# Patient Record
Sex: MALE | Race: WHITE | Employment: OTHER | URBAN - METROPOLITAN AREA
[De-identification: names, ages, dates, MRNs, and addresses within clinical notes are randomized per-mention and may not be internally consistent; named-entity substitution may affect disease eponyms.]

---

## 2019-01-01 ENCOUNTER — APPOINTMENT (OUTPATIENT)
Dept: CT IMAGING | Age: 84
DRG: 291 | End: 2019-01-01
Attending: THORACIC SURGERY (CARDIOTHORACIC VASCULAR SURGERY)
Payer: MEDICARE

## 2019-01-01 ENCOUNTER — APPOINTMENT (OUTPATIENT)
Dept: GENERAL RADIOLOGY | Age: 84
DRG: 291 | End: 2019-01-01
Attending: HOSPITALIST
Payer: MEDICARE

## 2019-01-01 ENCOUNTER — DOCUMENTATION ONLY (OUTPATIENT)
Dept: SURGERY | Age: 84
End: 2019-01-01

## 2019-01-01 ENCOUNTER — APPOINTMENT (OUTPATIENT)
Dept: GENERAL RADIOLOGY | Age: 84
DRG: 291 | End: 2019-01-01
Attending: EMERGENCY MEDICINE
Payer: MEDICARE

## 2019-01-01 ENCOUNTER — HOSPITAL ENCOUNTER (OUTPATIENT)
Dept: PET IMAGING | Age: 84
Discharge: HOME | End: 2019-12-27
Attending: THORACIC SURGERY (CARDIOTHORACIC VASCULAR SURGERY)
Payer: MEDICARE

## 2019-01-01 ENCOUNTER — HOSPITAL ENCOUNTER (INPATIENT)
Age: 84
LOS: 5 days | Discharge: NURSING, REHAB OR LONG-TERM CARE | DRG: 291 | End: 2019-12-09
Attending: STUDENT IN AN ORGANIZED HEALTH CARE EDUCATION/TRAINING PROGRAM | Admitting: FAMILY MEDICINE
Payer: MEDICARE

## 2019-01-01 ENCOUNTER — APPOINTMENT (OUTPATIENT)
Dept: CT IMAGING | Age: 84
DRG: 291 | End: 2019-01-01
Attending: INTERNAL MEDICINE
Payer: MEDICARE

## 2019-01-01 ENCOUNTER — APPOINTMENT (OUTPATIENT)
Dept: NON INVASIVE DIAGNOSTICS | Age: 84
DRG: 291 | End: 2019-01-01
Attending: FAMILY MEDICINE
Payer: MEDICARE

## 2019-01-01 ENCOUNTER — APPOINTMENT (OUTPATIENT)
Dept: ULTRASOUND IMAGING | Age: 84
DRG: 291 | End: 2019-01-01
Attending: HOSPITALIST
Payer: MEDICARE

## 2019-01-01 ENCOUNTER — TELEPHONE (OUTPATIENT)
Dept: SURGERY | Age: 84
End: 2019-01-01

## 2019-01-01 ENCOUNTER — APPOINTMENT (OUTPATIENT)
Dept: GENERAL RADIOLOGY | Age: 84
DRG: 291 | End: 2019-01-01
Attending: RADIOLOGY
Payer: MEDICARE

## 2019-01-01 ENCOUNTER — APPOINTMENT (OUTPATIENT)
Dept: GENERAL RADIOLOGY | Age: 84
End: 2019-01-01
Attending: THORACIC SURGERY (CARDIOTHORACIC VASCULAR SURGERY)
Payer: MEDICARE

## 2019-01-01 ENCOUNTER — HOSPITAL ENCOUNTER (OUTPATIENT)
Dept: PET IMAGING | Age: 84
Discharge: HOME | End: 2019-12-20
Attending: THORACIC SURGERY (CARDIOTHORACIC VASCULAR SURGERY)
Payer: MEDICARE

## 2019-01-01 ENCOUNTER — DOCUMENTATION ONLY (OUTPATIENT)
Dept: CASE MANAGEMENT | Age: 84
End: 2019-01-01

## 2019-01-01 ENCOUNTER — HOSPITAL ENCOUNTER (OUTPATIENT)
Dept: GENERAL RADIOLOGY | Age: 84
Discharge: HOME | End: 2019-12-17
Payer: MEDICARE

## 2019-01-01 ENCOUNTER — APPOINTMENT (OUTPATIENT)
Dept: GENERAL RADIOLOGY | Age: 84
End: 2019-01-01
Attending: EMERGENCY MEDICINE
Payer: MEDICARE

## 2019-01-01 ENCOUNTER — OFFICE VISIT (OUTPATIENT)
Dept: SURGERY | Age: 84
End: 2019-01-01

## 2019-01-01 ENCOUNTER — APPOINTMENT (OUTPATIENT)
Dept: GENERAL RADIOLOGY | Age: 84
End: 2019-01-01
Attending: NURSE PRACTITIONER
Payer: MEDICARE

## 2019-01-01 ENCOUNTER — APPOINTMENT (OUTPATIENT)
Dept: ULTRASOUND IMAGING | Age: 84
DRG: 291 | End: 2019-01-01
Attending: FAMILY MEDICINE
Payer: MEDICARE

## 2019-01-01 ENCOUNTER — APPOINTMENT (OUTPATIENT)
Dept: NON INVASIVE DIAGNOSTICS | Age: 84
DRG: 291 | End: 2019-01-01
Attending: HOSPITALIST
Payer: MEDICARE

## 2019-01-01 ENCOUNTER — HOSPITAL ENCOUNTER (INPATIENT)
Age: 84
LOS: 7 days | Discharge: NURSING, REHAB OR LONG-TERM CARE | DRG: 291 | End: 2019-08-01
Attending: EMERGENCY MEDICINE | Admitting: INTERNAL MEDICINE
Payer: MEDICARE

## 2019-01-01 ENCOUNTER — APPOINTMENT (OUTPATIENT)
Dept: CT IMAGING | Age: 84
DRG: 291 | End: 2019-01-01
Attending: STUDENT IN AN ORGANIZED HEALTH CARE EDUCATION/TRAINING PROGRAM
Payer: MEDICARE

## 2019-01-01 ENCOUNTER — ANESTHESIA (OUTPATIENT)
Dept: ENDOSCOPY | Age: 84
DRG: 291 | End: 2019-01-01
Payer: MEDICARE

## 2019-01-01 ENCOUNTER — PATIENT OUTREACH (OUTPATIENT)
Dept: INTERNAL MEDICINE CLINIC | Age: 84
End: 2019-01-01

## 2019-01-01 ENCOUNTER — HOSPITAL ENCOUNTER (OUTPATIENT)
Dept: LAB | Age: 84
Discharge: HOME | End: 2019-12-17
Payer: MEDICARE

## 2019-01-01 ENCOUNTER — ANESTHESIA (OUTPATIENT)
Dept: ENDOSCOPY | Age: 84
End: 2019-01-01
Payer: MEDICARE

## 2019-01-01 ENCOUNTER — HOSPITAL ENCOUNTER (OUTPATIENT)
Age: 84
Setting detail: OBSERVATION
Discharge: HOME HEALTH | End: 2019-12-24
Attending: EMERGENCY MEDICINE | Admitting: HOSPITALIST
Payer: MEDICARE

## 2019-01-01 ENCOUNTER — ANESTHESIA EVENT (OUTPATIENT)
Dept: ENDOSCOPY | Age: 84
End: 2019-01-01
Payer: MEDICARE

## 2019-01-01 ENCOUNTER — APPOINTMENT (OUTPATIENT)
Dept: GENERAL RADIOLOGY | Age: 84
DRG: 291 | End: 2019-01-01
Attending: STUDENT IN AN ORGANIZED HEALTH CARE EDUCATION/TRAINING PROGRAM
Payer: MEDICARE

## 2019-01-01 ENCOUNTER — ANESTHESIA EVENT (OUTPATIENT)
Dept: ENDOSCOPY | Age: 84
DRG: 291 | End: 2019-01-01
Payer: MEDICARE

## 2019-01-01 VITALS
HEIGHT: 74 IN | TEMPERATURE: 97.6 F | RESPIRATION RATE: 18 BRPM | DIASTOLIC BLOOD PRESSURE: 88 MMHG | HEART RATE: 50 BPM | WEIGHT: 151.9 LBS | BODY MASS INDEX: 19.49 KG/M2 | SYSTOLIC BLOOD PRESSURE: 134 MMHG | OXYGEN SATURATION: 99 %

## 2019-01-01 VITALS — HEIGHT: 73 IN | BODY MASS INDEX: 21.2 KG/M2 | WEIGHT: 160 LBS

## 2019-01-01 VITALS
BODY MASS INDEX: 19.64 KG/M2 | HEART RATE: 50 BPM | DIASTOLIC BLOOD PRESSURE: 65 MMHG | OXYGEN SATURATION: 96 % | HEIGHT: 72 IN | SYSTOLIC BLOOD PRESSURE: 133 MMHG | WEIGHT: 145 LBS | RESPIRATION RATE: 18 BRPM

## 2019-01-01 VITALS
OXYGEN SATURATION: 100 % | HEART RATE: 59 BPM | TEMPERATURE: 97.1 F | SYSTOLIC BLOOD PRESSURE: 144 MMHG | DIASTOLIC BLOOD PRESSURE: 59 MMHG | BODY MASS INDEX: 19.89 KG/M2 | RESPIRATION RATE: 20 BRPM | WEIGHT: 146.83 LBS | HEIGHT: 72 IN

## 2019-01-01 VITALS
WEIGHT: 143.74 LBS | SYSTOLIC BLOOD PRESSURE: 160 MMHG | HEIGHT: 72 IN | RESPIRATION RATE: 16 BRPM | BODY MASS INDEX: 19.47 KG/M2 | OXYGEN SATURATION: 100 % | TEMPERATURE: 97.3 F | DIASTOLIC BLOOD PRESSURE: 82 MMHG | HEART RATE: 60 BPM

## 2019-01-01 DIAGNOSIS — J90 PLEURAL EFFUSION: Primary | ICD-10-CM

## 2019-01-01 DIAGNOSIS — C85.12 B-CELL LYMPHOMA OF INTRATHORACIC LYMPH NODES, UNSPECIFIED B-CELL LYMPHOMA TYPE (HCC): ICD-10-CM

## 2019-01-01 DIAGNOSIS — I50.9 ACUTE ON CHRONIC CONGESTIVE HEART FAILURE, UNSPECIFIED HEART FAILURE TYPE (HCC): ICD-10-CM

## 2019-01-01 DIAGNOSIS — D64.9 ANEMIA, UNSPECIFIED TYPE: ICD-10-CM

## 2019-01-01 DIAGNOSIS — N17.9 AKI (ACUTE KIDNEY INJURY) (HCC): Primary | ICD-10-CM

## 2019-01-01 DIAGNOSIS — R06.09 DYSPNEA ON EXERTION: Primary | ICD-10-CM

## 2019-01-01 DIAGNOSIS — J90 PLEURAL EFFUSION: ICD-10-CM

## 2019-01-01 DIAGNOSIS — Z11.59 ENCOUNTER FOR SCREENING FOR OTHER VIRAL DISEASES: ICD-10-CM

## 2019-01-01 DIAGNOSIS — J90 LARGE PLEURAL EFFUSION: ICD-10-CM

## 2019-01-01 DIAGNOSIS — C85.12 B-CELL LYMPHOMA OF INTRATHORACIC LYMPH NODES, UNSPECIFIED B-CELL LYMPHOMA TYPE (HCC): Primary | ICD-10-CM

## 2019-01-01 DIAGNOSIS — C85.92 NON-HODGKIN LYMPHOMA OF INTRATHORACIC LYMPH NODES, UNSPECIFIED NON-HODGKIN LYMPHOMA TYPE (HCC): ICD-10-CM

## 2019-01-01 DIAGNOSIS — R19.5 OCCULT BLOOD IN STOOLS: ICD-10-CM

## 2019-01-01 LAB
ALBUMIN SERPL ELPH-MCNC: 3.1 G/DL (ref 2.9–4.4)
ALBUMIN SERPL-MCNC: 3 G/DL (ref 3.5–5)
ALBUMIN SERPL-MCNC: 3.1 G/DL (ref 3.5–5)
ALBUMIN SERPL-MCNC: 3.2 G/DL (ref 3.5–5)
ALBUMIN SERPL-MCNC: 3.2 G/DL (ref 3.5–5)
ALBUMIN SERPL-MCNC: 3.3 G/DL (ref 3.5–5)
ALBUMIN SERPL-MCNC: 3.3 G/DL (ref 3.5–5)
ALBUMIN/GLOB SERPL: 0.8 {RATIO} (ref 1.1–2.2)
ALBUMIN/GLOB SERPL: 0.9 {RATIO} (ref 1.1–2.2)
ALBUMIN/GLOB SERPL: 0.9 {RATIO} (ref 1.1–2.2)
ALBUMIN/GLOB SERPL: 1 {RATIO} (ref 0.7–1.7)
ALBUMIN/GLOB SERPL: 1 {RATIO} (ref 1.1–2.2)
ALP SERPL-CCNC: 62 U/L (ref 45–117)
ALP SERPL-CCNC: 69 U/L (ref 45–117)
ALP SERPL-CCNC: 71 U/L (ref 45–117)
ALP SERPL-CCNC: 72 U/L (ref 45–117)
ALP SERPL-CCNC: 73 U/L (ref 45–117)
ALP SERPL-CCNC: 75 U/L (ref 45–117)
ALPHA1 GLOB SERPL ELPH-MCNC: 0.3 G/DL (ref 0–0.4)
ALPHA2 GLOB SERPL ELPH-MCNC: 0.7 G/DL (ref 0.4–1)
ALT SERPL-CCNC: 19 U/L (ref 12–78)
ALT SERPL-CCNC: 22 U/L (ref 12–78)
ALT SERPL-CCNC: 29 U/L (ref 12–78)
ALT SERPL-CCNC: 31 U/L (ref 12–78)
ALT SERPL-CCNC: 31 U/L (ref 12–78)
ALT SERPL-CCNC: 33 U/L (ref 12–78)
ANION GAP SERPL CALC-SCNC: 10 MMOL/L (ref 5–15)
ANION GAP SERPL CALC-SCNC: 5 MMOL/L (ref 5–15)
ANION GAP SERPL CALC-SCNC: 5 MMOL/L (ref 5–15)
ANION GAP SERPL CALC-SCNC: 6 MMOL/L (ref 5–15)
ANION GAP SERPL CALC-SCNC: 7 MMOL/L (ref 5–15)
ANION GAP SERPL CALC-SCNC: 8 MMOL/L (ref 5–15)
ANION GAP SERPL CALC-SCNC: 8 MMOL/L (ref 5–15)
ANION GAP SERPL CALC-SCNC: 9 MMOL/L (ref 5–15)
APPEARANCE FLD: ABNORMAL
APPEARANCE FLD: ABNORMAL
APPEARANCE UR: CLEAR
APPEARANCE UR: CLEAR
APTT PPP: 33.3 SEC (ref 22.1–32)
AST SERPL-CCNC: 23 U/L (ref 15–37)
AST SERPL-CCNC: 26 U/L (ref 15–37)
AST SERPL-CCNC: 31 U/L (ref 15–37)
AST SERPL-CCNC: 34 U/L (ref 15–37)
AST SERPL-CCNC: 37 U/L (ref 15–37)
AST SERPL-CCNC: 37 U/L (ref 15–37)
ATRIAL RATE: 288 BPM
ATRIAL RATE: 71 BPM
ATRIAL RATE: 74 BPM
ATRIAL RATE: 87 BPM
AV VELOCITY RATIO: 0.42
AV VTI RATIO: 0.4
B-GLOBULIN SERPL ELPH-MCNC: 0.9 G/DL (ref 0.7–1.3)
BACTERIA SPEC CULT: NORMAL
BACTERIA URNS QL MICRO: NEGATIVE /HPF
BASOPHILS # BLD MANUAL: 0.1 X10E3/UL (ref 0–0.2)
BASOPHILS # BLD: 0 K/UL (ref 0–0.1)
BASOPHILS # BLD: 0.1 K/UL (ref 0–0.1)
BASOPHILS NFR BLD MANUAL: 1 %
BASOPHILS NFR BLD: 0 % (ref 0–1)
BASOPHILS NFR BLD: 1 % (ref 0–1)
BILIRUB SERPL-MCNC: 0.4 MG/DL (ref 0.2–1)
BILIRUB SERPL-MCNC: 0.5 MG/DL (ref 0.2–1)
BILIRUB SERPL-MCNC: 0.6 MG/DL (ref 0.2–1)
BILIRUB UR QL: NEGATIVE
BILIRUB UR QL: NEGATIVE
BNP SERPL-MCNC: 3299 PG/ML
BNP SERPL-MCNC: 4482 PG/ML
BUN SERPL-MCNC: 38 MG/DL (ref 6–20)
BUN SERPL-MCNC: 41 MG/DL (ref 6–20)
BUN SERPL-MCNC: 42 MG/DL (ref 6–20)
BUN SERPL-MCNC: 43 MG/DL (ref 6–20)
BUN SERPL-MCNC: 43 MG/DL (ref 6–20)
BUN SERPL-MCNC: 45 MG/DL (ref 6–20)
BUN SERPL-MCNC: 49 MG/DL (ref 6–20)
BUN SERPL-MCNC: 50 MG/DL (ref 6–20)
BUN SERPL-MCNC: 52 MG/DL (ref 6–20)
BUN SERPL-MCNC: 55 MG/DL (ref 6–20)
BUN SERPL-MCNC: 57 MG/DL (ref 6–20)
BUN SERPL-MCNC: 58 MG/DL (ref 6–20)
BUN SERPL-MCNC: 63 MG/DL (ref 6–20)
BUN SERPL-MCNC: 70 MG/DL (ref 6–20)
BUN SERPL-MCNC: 71 MG/DL (ref 6–20)
BUN/CREAT SERPL: 20 (ref 12–20)
BUN/CREAT SERPL: 20 (ref 12–20)
BUN/CREAT SERPL: 22 (ref 12–20)
BUN/CREAT SERPL: 23 (ref 12–20)
BUN/CREAT SERPL: 23 (ref 12–20)
BUN/CREAT SERPL: 25 (ref 12–20)
BUN/CREAT SERPL: 26 (ref 12–20)
BUN/CREAT SERPL: 28 (ref 12–20)
BUN/CREAT SERPL: 28 (ref 12–20)
BUN/CREAT SERPL: 29 (ref 12–20)
BUN/CREAT SERPL: 30 (ref 12–20)
BUN/CREAT SERPL: 32 (ref 12–20)
BUN/CREAT SERPL: 33 (ref 12–20)
CALCIUM SERPL-MCNC: 8.5 MG/DL (ref 8.5–10.1)
CALCIUM SERPL-MCNC: 8.6 MG/DL (ref 8.5–10.1)
CALCIUM SERPL-MCNC: 8.7 MG/DL (ref 8.5–10.1)
CALCIUM SERPL-MCNC: 8.7 MG/DL (ref 8.5–10.1)
CALCIUM SERPL-MCNC: 8.8 MG/DL (ref 8.5–10.1)
CALCIUM SERPL-MCNC: 8.9 MG/DL (ref 8.5–10.1)
CALCIUM SERPL-MCNC: 9 MG/DL (ref 8.5–10.1)
CALCIUM SERPL-MCNC: 9.1 MG/DL (ref 8.5–10.1)
CALCIUM SERPL-MCNC: 9.6 MG/DL (ref 8.5–10.1)
CALCIUM SERPL-MCNC: 9.6 MG/DL (ref 8.5–10.1)
CALCULATED R AXIS, ECG10: -36 DEGREES
CALCULATED R AXIS, ECG10: -37 DEGREES
CALCULATED R AXIS, ECG10: -39 DEGREES
CALCULATED R AXIS, ECG10: -45 DEGREES
CALCULATED T AXIS, ECG11: 63 DEGREES
CALCULATED T AXIS, ECG11: 84 DEGREES
CALCULATED T AXIS, ECG11: 87 DEGREES
CALCULATED T AXIS, ECG11: 98 DEGREES
CHLORIDE SERPL-SCNC: 100 MMOL/L (ref 97–108)
CHLORIDE SERPL-SCNC: 100 MMOL/L (ref 97–108)
CHLORIDE SERPL-SCNC: 101 MMOL/L (ref 97–108)
CHLORIDE SERPL-SCNC: 102 MMOL/L (ref 97–108)
CHLORIDE SERPL-SCNC: 103 MMOL/L (ref 97–108)
CHLORIDE SERPL-SCNC: 104 MMOL/L (ref 97–108)
CHLORIDE SERPL-SCNC: 105 MMOL/L (ref 97–108)
CHLORIDE SERPL-SCNC: 106 MMOL/L (ref 97–108)
CHLORIDE SERPL-SCNC: 107 MMOL/L (ref 97–108)
CHLORIDE SERPL-SCNC: 109 MMOL/L (ref 97–108)
CHLORIDE SERPL-SCNC: 112 MMOL/L (ref 97–108)
CHLORIDE SERPL-SCNC: 114 MMOL/L (ref 97–108)
CHLORIDE SERPL-SCNC: 115 MMOL/L (ref 97–108)
CHOLEST SERPL-MCNC: 142 MG/DL
CO2 SERPL-SCNC: 25 MMOL/L (ref 21–32)
CO2 SERPL-SCNC: 26 MMOL/L (ref 21–32)
CO2 SERPL-SCNC: 26 MMOL/L (ref 21–32)
CO2 SERPL-SCNC: 27 MMOL/L (ref 21–32)
CO2 SERPL-SCNC: 28 MMOL/L (ref 21–32)
CO2 SERPL-SCNC: 29 MMOL/L (ref 21–32)
CO2 SERPL-SCNC: 29 MMOL/L (ref 21–32)
CO2 SERPL-SCNC: 30 MMOL/L (ref 21–32)
CO2 SERPL-SCNC: 31 MMOL/L (ref 21–32)
CO2 SERPL-SCNC: 32 MMOL/L (ref 21–32)
COLOR FLD: ABNORMAL
COLOR FLD: ABNORMAL
COLOR UR: ABNORMAL
COLOR UR: NORMAL
COMMENT, HOLDF: NORMAL
CREAT SERPL-MCNC: 1.31 MG/DL (ref 0.7–1.3)
CREAT SERPL-MCNC: 1.35 MG/DL (ref 0.7–1.3)
CREAT SERPL-MCNC: 1.5 MG/DL (ref 0.7–1.3)
CREAT SERPL-MCNC: 1.51 MG/DL (ref 0.7–1.3)
CREAT SERPL-MCNC: 1.51 MG/DL (ref 0.7–1.3)
CREAT SERPL-MCNC: 1.54 MG/DL (ref 0.7–1.3)
CREAT SERPL-MCNC: 1.77 MG/DL (ref 0.7–1.3)
CREAT SERPL-MCNC: 1.8 MG/DL (ref 0.7–1.3)
CREAT SERPL-MCNC: 2.14 MG/DL (ref 0.7–1.3)
CREAT SERPL-MCNC: 2.35 MG/DL (ref 0.7–1.3)
CREAT SERPL-MCNC: 2.42 MG/DL (ref 0.7–1.3)
CREAT SERPL-MCNC: 2.58 MG/DL (ref 0.7–1.3)
CREAT SERPL-MCNC: 2.75 MG/DL (ref 0.7–1.3)
CREAT SERPL-MCNC: 2.78 MG/DL (ref 0.7–1.3)
CREAT SERPL-MCNC: 2.84 MG/DL (ref 0.7–1.3)
DIAGNOSIS, 93000: NORMAL
DIFFERENTIAL COMMENT, 115260: ABNORMAL
DIFFERENTIAL METHOD BLD: ABNORMAL
ECHO AO ROOT DIAM: 3.72 CM
ECHO AV AREA PEAK VELOCITY: 1.7 CM2
ECHO AV AREA PEAK VELOCITY: 2 CM2
ECHO AV AREA VTI: 1.6 CM2
ECHO AV AREA VTI: 2 CM2
ECHO AV CUSP MM: 1.59 CM
ECHO AV MEAN GRADIENT: 11.3 MMHG
ECHO AV MEAN GRADIENT: 15.5 MMHG
ECHO AV MEAN VELOCITY: 1.87 M/S
ECHO AV PEAK GRADIENT: 23.4 MMHG
ECHO AV PEAK GRADIENT: 25.3 MMHG
ECHO AV PEAK VELOCITY: 241.99 CM/S
ECHO AV PEAK VELOCITY: 251.69 CM/S
ECHO AV VTI: 55.45 CM
ECHO AV VTI: 59.27 CM
ECHO EST RA PRESSURE: 15 MMHG
ECHO LA AREA 4C: 24.4 CM2
ECHO LA AREA 4C: 35.1 CM2
ECHO LA MAJOR AXIS: 3.34 CM
ECHO LA MAJOR AXIS: 4.67 CM
ECHO LA TO AORTIC ROOT RATIO: 1.26
ECHO LA VOL 2C: 62.67 ML (ref 18–58)
ECHO LA VOL 4C: 125.91 ML (ref 18–58)
ECHO LA VOL 4C: 60.67 ML (ref 18–58)
ECHO LA VOL BP: 77.04 ML (ref 18–58)
ECHO LA VOL/BSA BIPLANE: 39.21 ML/M2 (ref 16–28)
ECHO LA VOLUME INDEX A2C: 31.9 ML/M2 (ref 16–28)
ECHO LA VOLUME INDEX A4C: 30.88 ML/M2 (ref 16–28)
ECHO LA VOLUME INDEX A4C: 65.43 ML/M2 (ref 16–28)
ECHO LV E' LATERAL VELOCITY: 10.23 CM/S
ECHO LV E' SEPTAL VELOCITY: 7.61 CM/S
ECHO LV INTERNAL DIMENSION DIASTOLIC: 4.3 CM (ref 4.2–5.9)
ECHO LV INTERNAL DIMENSION DIASTOLIC: 4.68 CM (ref 4.2–5.9)
ECHO LV INTERNAL DIMENSION SYSTOLIC: 2.68 CM
ECHO LV INTERNAL DIMENSION SYSTOLIC: 3 CM
ECHO LV IVSD: 1.09 CM (ref 0.6–1)
ECHO LV IVSD: 1.44 CM (ref 0.6–1)
ECHO LV MASS 2D: 235.1 G (ref 88–224)
ECHO LV MASS 2D: 242.5 G (ref 88–224)
ECHO LV MASS INDEX 2D: 119.7 G/M2 (ref 49–115)
ECHO LV MASS INDEX 2D: 126 G/M2 (ref 49–115)
ECHO LV POSTERIOR WALL DIASTOLIC: 1.14 CM (ref 0.6–1)
ECHO LV POSTERIOR WALL DIASTOLIC: 1.23 CM (ref 0.6–1)
ECHO LVOT DIAM: 2.17 CM
ECHO LVOT DIAM: 2.28 CM
ECHO LVOT PEAK GRADIENT: 4.5 MMHG
ECHO LVOT PEAK GRADIENT: 6.8 MMHG
ECHO LVOT PEAK VELOCITY: 105.6 CM/S
ECHO LVOT PEAK VELOCITY: 130.42 CM/S
ECHO LVOT SV: 117.3 ML
ECHO LVOT SV: 88.7 ML
ECHO LVOT VTI: 21.81 CM
ECHO LVOT VTI: 31.7 CM
ECHO MV A VELOCITY: 39.09 CM/S
ECHO MV AREA PHT: 3.5 CM2
ECHO MV E DECELERATION TIME (DT): 214.9 MS
ECHO MV E VELOCITY: 107.78 CM/S
ECHO MV E/A RATIO: 2.76
ECHO MV E/E' LATERAL: 10.54
ECHO MV E/E' RATIO (AVERAGED): 12.35
ECHO MV E/E' SEPTAL: 14.16
ECHO MV PRESSURE HALF TIME (PHT): 62.3 MS
ECHO PULMONARY ARTERY SYSTOLIC PRESSURE (PASP): 43 MMHG
ECHO PV MAX VELOCITY: 125.36 CM/S
ECHO PV PEAK GRADIENT: 6.3 MMHG
ECHO RIGHT VENTRICULAR SYSTOLIC PRESSURE (RVSP): 43 MMHG
ECHO RV INTERNAL DIMENSION: 2.76 CM
ECHO RV INTERNAL DIMENSION: 4.89 CM
ECHO RV TAPSE: 1.83 CM (ref 1.5–2)
ECHO RV TAPSE: 2.07 CM (ref 1.5–2)
ECHO TV REGURGITANT MAX VELOCITY: 246.91 CM/S
ECHO TV REGURGITANT MAX VELOCITY: 264.49 CM/S
ECHO TV REGURGITANT PEAK GRADIENT: 24.4 MMHG
ECHO TV REGURGITANT PEAK GRADIENT: 28 MMHG
EOSINOPHIL # BLD MANUAL: 0.1 X10E3/UL (ref 0–0.4)
EOSINOPHIL # BLD: 0.1 K/UL (ref 0–0.4)
EOSINOPHIL # BLD: 0.2 K/UL (ref 0–0.4)
EOSINOPHIL NFR BLD MANUAL: 2 %
EOSINOPHIL NFR BLD: 1 % (ref 0–7)
EOSINOPHIL NFR BLD: 2 % (ref 0–7)
EOSINOPHIL NFR BLD: 3 % (ref 0–7)
EOSINOPHIL NFR BLD: 4 % (ref 0–7)
EPITH CASTS URNS QL MICRO: ABNORMAL /LPF
ERYTHROCYTE [DISTWIDTH] IN BLOOD BY AUTOMATED COUNT: 15.4 % (ref 12.3–15.4)
ERYTHROCYTE [DISTWIDTH] IN BLOOD BY AUTOMATED COUNT: 16 % (ref 11.5–14.5)
ERYTHROCYTE [DISTWIDTH] IN BLOOD BY AUTOMATED COUNT: 16 % (ref 11.5–14.5)
ERYTHROCYTE [DISTWIDTH] IN BLOOD BY AUTOMATED COUNT: 16.1 % (ref 11.5–14.5)
ERYTHROCYTE [DISTWIDTH] IN BLOOD BY AUTOMATED COUNT: 16.2 % (ref 11.5–14.5)
ERYTHROCYTE [DISTWIDTH] IN BLOOD BY AUTOMATED COUNT: 16.3 % (ref 11.5–14.5)
ERYTHROCYTE [DISTWIDTH] IN BLOOD BY AUTOMATED COUNT: 17.1 % (ref 11.5–14.5)
ERYTHROCYTE [DISTWIDTH] IN BLOOD BY AUTOMATED COUNT: 17.1 % (ref 11.5–14.5)
ERYTHROCYTE [DISTWIDTH] IN BLOOD BY AUTOMATED COUNT: 17.3 % (ref 11.5–14.5)
ERYTHROCYTE [DISTWIDTH] IN BLOOD BY AUTOMATED COUNT: 17.4 % (ref 11.5–14.5)
ERYTHROCYTE [DISTWIDTH] IN BLOOD BY AUTOMATED COUNT: 17.5 % (ref 11.5–14.5)
ERYTHROCYTE [DISTWIDTH] IN BLOOD BY AUTOMATED COUNT: 17.5 % (ref 11.5–14.5)
ERYTHROCYTE [DISTWIDTH] IN BLOOD BY AUTOMATED COUNT: 18.6 % (ref 11.5–14.5)
EST. AVERAGE GLUCOSE BLD GHB EST-MCNC: 140 MG/DL
FERRITIN SERPL-MCNC: 132 NG/ML (ref 26–388)
FOLATE SERPL-MCNC: 9.8 NG/ML (ref 5–21)
GAMMA GLOB SERPL ELPH-MCNC: 1.2 G/DL (ref 0.4–1.8)
GLOBULIN SER CALC-MCNC: 3.1 G/DL (ref 2.2–3.9)
GLOBULIN SER CALC-MCNC: 3.2 G/DL (ref 2–4)
GLOBULIN SER CALC-MCNC: 3.6 G/DL (ref 2–4)
GLOBULIN SER CALC-MCNC: 3.6 G/DL (ref 2–4)
GLOBULIN SER CALC-MCNC: 3.7 G/DL (ref 2–4)
GLOBULIN SER CALC-MCNC: 3.8 G/DL (ref 2–4)
GLOBULIN SER CALC-MCNC: 3.9 G/DL (ref 2–4)
GLUCOSE FLD-MCNC: 26 MG/DL
GLUCOSE SERPL-MCNC: 103 MG/DL (ref 65–100)
GLUCOSE SERPL-MCNC: 113 MG/DL (ref 65–100)
GLUCOSE SERPL-MCNC: 116 MG/DL (ref 65–100)
GLUCOSE SERPL-MCNC: 119 MG/DL (ref 65–100)
GLUCOSE SERPL-MCNC: 120 MG/DL (ref 65–100)
GLUCOSE SERPL-MCNC: 123 MG/DL (ref 65–100)
GLUCOSE SERPL-MCNC: 126 MG/DL (ref 65–100)
GLUCOSE SERPL-MCNC: 127 MG/DL (ref 65–100)
GLUCOSE SERPL-MCNC: 139 MG/DL (ref 65–100)
GLUCOSE SERPL-MCNC: 140 MG/DL (ref 65–100)
GLUCOSE SERPL-MCNC: 142 MG/DL (ref 65–100)
GLUCOSE SERPL-MCNC: 146 MG/DL (ref 65–100)
GLUCOSE SERPL-MCNC: 160 MG/DL (ref 65–100)
GLUCOSE SERPL-MCNC: 179 MG/DL (ref 65–100)
GLUCOSE SERPL-MCNC: 251 MG/DL (ref 65–100)
GLUCOSE UR STRIP.AUTO-MCNC: NEGATIVE MG/DL
GLUCOSE UR STRIP.AUTO-MCNC: NEGATIVE MG/DL
GRAM STN SPEC: NORMAL
HBA1C MFR BLD: 6.5 % (ref 4–5.6)
HBV SURFACE AG SERPL QL IA: NEGATIVE
HCT VFR BLD AUTO: 25.4 % (ref 37.5–51)
HCT VFR BLD AUTO: 25.9 % (ref 36.6–50.3)
HCT VFR BLD AUTO: 26.3 % (ref 36.6–50.3)
HCT VFR BLD AUTO: 26.9 % (ref 36.6–50.3)
HCT VFR BLD AUTO: 26.9 % (ref 36.6–50.3)
HCT VFR BLD AUTO: 27.6 % (ref 36.6–50.3)
HCT VFR BLD AUTO: 27.6 % (ref 36.6–50.3)
HCT VFR BLD AUTO: 27.7 % (ref 36.6–50.3)
HCT VFR BLD AUTO: 27.9 % (ref 36.6–50.3)
HCT VFR BLD AUTO: 28.3 % (ref 36.6–50.3)
HCT VFR BLD AUTO: 29.3 % (ref 36.6–50.3)
HCT VFR BLD AUTO: 29.3 % (ref 36.6–50.3)
HCT VFR BLD AUTO: 31.2 % (ref 36.6–50.3)
HCT VFR BLD AUTO: 31.3 % (ref 36.6–50.3)
HCT VFR BLD AUTO: 31.3 % (ref 36.6–50.3)
HCT VFR BLD AUTO: 31.4 % (ref 36.6–50.3)
HCT VFR BLD AUTO: 32.7 % (ref 36.6–50.3)
HDLC SERPL-MCNC: 79 MG/DL
HDLC SERPL: 1.8 {RATIO} (ref 0–5)
HEMOCCULT STL QL: POSITIVE
HGB BLD-MCNC: 10.4 G/DL (ref 12.1–17)
HGB BLD-MCNC: 7.9 G/DL (ref 12.1–17)
HGB BLD-MCNC: 7.9 G/DL (ref 12.1–17)
HGB BLD-MCNC: 8.2 G/DL (ref 12.1–17)
HGB BLD-MCNC: 8.4 G/DL (ref 12.1–17)
HGB BLD-MCNC: 8.4 G/DL (ref 13–17.7)
HGB BLD-MCNC: 8.5 G/DL (ref 12.1–17)
HGB BLD-MCNC: 8.6 G/DL (ref 12.1–17)
HGB BLD-MCNC: 8.6 G/DL (ref 12.1–17)
HGB BLD-MCNC: 8.7 G/DL (ref 12.1–17)
HGB BLD-MCNC: 8.8 G/DL (ref 12.1–17)
HGB BLD-MCNC: 9.1 G/DL (ref 12.1–17)
HGB BLD-MCNC: 9.2 G/DL (ref 12.1–17)
HGB BLD-MCNC: 9.5 G/DL (ref 12.1–17)
HGB BLD-MCNC: 9.6 G/DL (ref 12.1–17)
HGB BLD-MCNC: 9.7 G/DL (ref 12.1–17)
HGB BLD-MCNC: 9.7 G/DL (ref 12.1–17)
HGB UR QL STRIP: NEGATIVE
HGB UR QL STRIP: NEGATIVE
HIV 1+2 AB+HIV1 P24 AG SERPL QL IA: NON REACTIVE
HYALINE CASTS URNS QL MICRO: ABNORMAL /LPF (ref 0–5)
IMM GRANULOCYTES # BLD AUTO: 0 K/UL (ref 0–0.04)
IMM GRANULOCYTES # BLD AUTO: 0.1 K/UL (ref 0–0.04)
IMM GRANULOCYTES NFR BLD AUTO: 0 % (ref 0–0.5)
IMM GRANULOCYTES NFR BLD AUTO: 1 % (ref 0–0.5)
INR PPP: 1.1 (ref 0.9–1.1)
INR PPP: 1.1 (ref 0.9–1.1)
IRON SERPL-MCNC: 28 UG/DL (ref 35–150)
KETONES UR QL STRIP.AUTO: NEGATIVE MG/DL
KETONES UR QL STRIP.AUTO: NEGATIVE MG/DL
LACTATE BLD-SCNC: 1.13 MMOL/L (ref 0.4–2)
LDH FLD L TO P-CCNC: 2579 U/L
LDH FLD L TO P-CCNC: 3663 U/L
LDH SERPL-CCNC: 323 IU/L (ref 121–224)
LDLC SERPL CALC-MCNC: 56.2 MG/DL (ref 0–100)
LEUKOCYTE ESTERASE UR QL STRIP.AUTO: ABNORMAL
LEUKOCYTE ESTERASE UR QL STRIP.AUTO: NEGATIVE
LIPID PROFILE,FLP: NORMAL
LVFS 2D: 37.78 %
LVOT MG: 2.6 MMHG
LVOT MV: 0.76 CM/S
LYMPHOCYTES # BLD MANUAL: 0.9 X10E3/UL (ref 0.7–3.1)
LYMPHOCYTES # BLD: 0.4 K/UL (ref 0.8–3.5)
LYMPHOCYTES # BLD: 0.4 K/UL (ref 0.8–3.5)
LYMPHOCYTES # BLD: 0.5 K/UL (ref 0.8–3.5)
LYMPHOCYTES # BLD: 0.6 K/UL (ref 0.8–3.5)
LYMPHOCYTES # BLD: 0.7 K/UL (ref 0.8–3.5)
LYMPHOCYTES NFR BLD MANUAL: 14 %
LYMPHOCYTES NFR BLD: 10 % (ref 12–49)
LYMPHOCYTES NFR BLD: 10 % (ref 12–49)
LYMPHOCYTES NFR BLD: 12 % (ref 12–49)
LYMPHOCYTES NFR BLD: 13 % (ref 12–49)
LYMPHOCYTES NFR BLD: 5 % (ref 12–49)
LYMPHOCYTES NFR BLD: 6 % (ref 12–49)
LYMPHOCYTES NFR BLD: 7 % (ref 12–49)
LYMPHOCYTES NFR BLD: 8 % (ref 12–49)
LYMPHOCYTES NFR BLD: 8 % (ref 12–49)
LYMPHOCYTES NFR FLD: 2 %
LYMPHOCYTES NFR FLD: 49 %
M PROTEIN SERPL ELPH-MCNC: NORMAL G/DL
MAGNESIUM SERPL-MCNC: 1.8 MG/DL (ref 1.6–2.4)
MAGNESIUM SERPL-MCNC: 1.9 MG/DL (ref 1.6–2.4)
MAGNESIUM SERPL-MCNC: 1.9 MG/DL (ref 1.6–2.4)
MAGNESIUM SERPL-MCNC: 2.1 MG/DL (ref 1.6–2.4)
MAGNESIUM SERPL-MCNC: 2.1 MG/DL (ref 1.6–2.4)
MAGNESIUM SERPL-MCNC: 2.2 MG/DL (ref 1.6–2.4)
MCH RBC QN AUTO: 27.8 PG (ref 26–34)
MCH RBC QN AUTO: 27.9 PG (ref 26–34)
MCH RBC QN AUTO: 28.2 PG (ref 26–34)
MCH RBC QN AUTO: 28.3 PG (ref 26–34)
MCH RBC QN AUTO: 28.5 PG (ref 26–34)
MCH RBC QN AUTO: 28.6 PG (ref 26–34)
MCH RBC QN AUTO: 28.7 PG (ref 26–34)
MCH RBC QN AUTO: 30.3 PG (ref 26–34)
MCH RBC QN AUTO: 30.3 PG (ref 26–34)
MCH RBC QN AUTO: 30.4 PG (ref 26.6–33)
MCH RBC QN AUTO: 30.5 PG (ref 26–34)
MCH RBC QN AUTO: 30.5 PG (ref 26–34)
MCH RBC QN AUTO: 30.7 PG (ref 26–34)
MCH RBC QN AUTO: 30.9 PG (ref 26–34)
MCH RBC QN AUTO: 30.9 PG (ref 26–34)
MCHC RBC AUTO-ENTMCNC: 30 G/DL (ref 30–36.5)
MCHC RBC AUTO-ENTMCNC: 30.4 G/DL (ref 30–36.5)
MCHC RBC AUTO-ENTMCNC: 30.4 G/DL (ref 30–36.5)
MCHC RBC AUTO-ENTMCNC: 30.5 G/DL (ref 30–36.5)
MCHC RBC AUTO-ENTMCNC: 30.8 G/DL (ref 30–36.5)
MCHC RBC AUTO-ENTMCNC: 30.9 G/DL (ref 30–36.5)
MCHC RBC AUTO-ENTMCNC: 31 G/DL (ref 30–36.5)
MCHC RBC AUTO-ENTMCNC: 31.1 G/DL (ref 30–36.5)
MCHC RBC AUTO-ENTMCNC: 31.2 G/DL (ref 30–36.5)
MCHC RBC AUTO-ENTMCNC: 31.4 G/DL (ref 30–36.5)
MCHC RBC AUTO-ENTMCNC: 31.8 G/DL (ref 30–36.5)
MCHC RBC AUTO-ENTMCNC: 31.8 G/DL (ref 30–36.5)
MCHC RBC AUTO-ENTMCNC: 33.1 G/DL (ref 31.5–35.7)
MCV RBC AUTO: 102 FL (ref 80–99)
MCV RBC AUTO: 90.5 FL (ref 80–99)
MCV RBC AUTO: 90.6 FL (ref 80–99)
MCV RBC AUTO: 91.5 FL (ref 80–99)
MCV RBC AUTO: 91.9 FL (ref 80–99)
MCV RBC AUTO: 92 FL (ref 79–97)
MCV RBC AUTO: 92.4 FL (ref 80–99)
MCV RBC AUTO: 92.6 FL (ref 80–99)
MCV RBC AUTO: 93.8 FL (ref 80–99)
MCV RBC AUTO: 95.3 FL (ref 80–99)
MCV RBC AUTO: 97.2 FL (ref 80–99)
MCV RBC AUTO: 97.7 FL (ref 80–99)
MCV RBC AUTO: 97.8 FL (ref 80–99)
MCV RBC AUTO: 98.7 FL (ref 80–99)
MCV RBC AUTO: 99 FL (ref 80–99)
MONOCYTES # BLD MANUAL: 0.4 X10E3/UL (ref 0.1–0.9)
MONOCYTES # BLD: 0.3 K/UL (ref 0–1)
MONOCYTES # BLD: 0.4 K/UL (ref 0–1)
MONOCYTES # BLD: 0.6 K/UL (ref 0–1)
MONOCYTES NFR BLD MANUAL: 6 %
MONOCYTES NFR BLD: 3 % (ref 5–13)
MONOCYTES NFR BLD: 5 % (ref 5–13)
MONOCYTES NFR BLD: 5 % (ref 5–13)
MONOCYTES NFR BLD: 6 % (ref 5–13)
MONOCYTES NFR BLD: 6 % (ref 5–13)
MONOCYTES NFR BLD: 7 % (ref 5–13)
MONOS+MACROS NFR FLD: 46 %
MONOS+MACROS NFR FLD: 96 %
NEUTROPHILS # BLD MANUAL: 5 X10E3/UL (ref 1.4–7)
NEUTROPHILS NFR BLD MANUAL: 77 %
NEUTROPHILS NFR FLD: 2 %
NEUTROPHILS NFR FLD: 5 %
NEUTS SEG # BLD: 11.1 K/UL (ref 1.8–8)
NEUTS SEG # BLD: 3.7 K/UL (ref 1.8–8)
NEUTS SEG # BLD: 3.7 K/UL (ref 1.8–8)
NEUTS SEG # BLD: 4.3 K/UL (ref 1.8–8)
NEUTS SEG # BLD: 4.3 K/UL (ref 1.8–8)
NEUTS SEG # BLD: 5 K/UL (ref 1.8–8)
NEUTS SEG # BLD: 5.2 K/UL (ref 1.8–8)
NEUTS SEG # BLD: 6.8 K/UL (ref 1.8–8)
NEUTS SEG # BLD: 6.9 K/UL (ref 1.8–8)
NEUTS SEG NFR BLD: 76 % (ref 32–75)
NEUTS SEG NFR BLD: 77 % (ref 32–75)
NEUTS SEG NFR BLD: 79 % (ref 32–75)
NEUTS SEG NFR BLD: 79 % (ref 32–75)
NEUTS SEG NFR BLD: 83 % (ref 32–75)
NEUTS SEG NFR BLD: 84 % (ref 32–75)
NEUTS SEG NFR BLD: 84 % (ref 32–75)
NEUTS SEG NFR BLD: 87 % (ref 32–75)
NEUTS SEG NFR BLD: 90 % (ref 32–75)
NITRITE UR QL STRIP.AUTO: NEGATIVE
NITRITE UR QL STRIP.AUTO: NEGATIVE
NRBC # BLD: 0 K/UL (ref 0–0.01)
NRBC BLD-RTO: 0 PER 100 WBC
NUC CELL # FLD: ABNORMAL /CU MM
NUC CELL # FLD: ABNORMAL /CU MM
PATH REV BLD -IMP: ABNORMAL
PH UR STRIP: 5 [PH] (ref 5–8)
PH UR STRIP: 7.5 [PH] (ref 5–8)
PHOSPHATE SERPL-MCNC: 2.8 MG/DL (ref 2.6–4.7)
PHOSPHATE SERPL-MCNC: 3.4 MG/DL (ref 2.6–4.7)
PHOSPHATE SERPL-MCNC: 3.5 MG/DL (ref 2.6–4.7)
PHOSPHATE SERPL-MCNC: 3.9 MG/DL (ref 2.6–4.7)
PLATELET # BLD AUTO: 142 K/UL (ref 150–400)
PLATELET # BLD AUTO: 148 K/UL (ref 150–400)
PLATELET # BLD AUTO: 151 K/UL (ref 150–400)
PLATELET # BLD AUTO: 154 K/UL (ref 150–400)
PLATELET # BLD AUTO: 154 K/UL (ref 150–400)
PLATELET # BLD AUTO: 155 K/UL (ref 150–400)
PLATELET # BLD AUTO: 159 K/UL (ref 150–400)
PLATELET # BLD AUTO: 160 K/UL (ref 150–400)
PLATELET # BLD AUTO: 160 K/UL (ref 150–400)
PLATELET # BLD AUTO: 175 K/UL (ref 150–400)
PLATELET # BLD AUTO: 177 K/UL (ref 150–400)
PLATELET # BLD AUTO: 179 K/UL (ref 150–400)
PLATELET # BLD AUTO: 204 K/UL (ref 150–400)
PLATELET # BLD AUTO: 237 K/UL (ref 150–400)
PLATELET BLD QL SMEAR: ADEQUATE
PLATELET COMMENTS,PCOM: ABNORMAL
PMV BLD AUTO: 8.8 FL (ref 8.9–12.9)
PMV BLD AUTO: 9.1 FL (ref 8.9–12.9)
PMV BLD AUTO: 9.2 FL (ref 8.9–12.9)
PMV BLD AUTO: 9.3 FL (ref 8.9–12.9)
PMV BLD AUTO: 9.5 FL (ref 8.9–12.9)
POTASSIUM SERPL-SCNC: 2.9 MMOL/L (ref 3.5–5.1)
POTASSIUM SERPL-SCNC: 3.2 MMOL/L (ref 3.5–5.1)
POTASSIUM SERPL-SCNC: 3.2 MMOL/L (ref 3.5–5.1)
POTASSIUM SERPL-SCNC: 3.4 MMOL/L (ref 3.5–5.1)
POTASSIUM SERPL-SCNC: 3.5 MMOL/L (ref 3.5–5.1)
POTASSIUM SERPL-SCNC: 3.6 MMOL/L (ref 3.5–5.1)
POTASSIUM SERPL-SCNC: 3.7 MMOL/L (ref 3.5–5.1)
POTASSIUM SERPL-SCNC: 4 MMOL/L (ref 3.5–5.1)
POTASSIUM SERPL-SCNC: 4.3 MMOL/L (ref 3.5–5.1)
PROT FLD-MCNC: 4.2 G/DL
PROT SERPL-MCNC: 6.2 G/DL (ref 6–8.5)
PROT SERPL-MCNC: 6.4 G/DL (ref 6.4–8.2)
PROT SERPL-MCNC: 6.8 G/DL (ref 6.4–8.2)
PROT SERPL-MCNC: 6.9 G/DL (ref 6.4–8.2)
PROT SERPL-MCNC: 7.2 G/DL (ref 6.4–8.2)
PROT UR STRIP-MCNC: 100 MG/DL
PROT UR STRIP-MCNC: NEGATIVE MG/DL
PROTHROMBIN TIME: 11.1 SEC (ref 9–11.1)
PROTHROMBIN TIME: 11.4 SEC (ref 9–11.1)
Q-T INTERVAL, ECG07: 442 MS
Q-T INTERVAL, ECG07: 454 MS
Q-T INTERVAL, ECG07: 474 MS
Q-T INTERVAL, ECG07: 486 MS
QRS DURATION, ECG06: 88 MS
QRS DURATION, ECG06: 88 MS
QRS DURATION, ECG06: 92 MS
QRS DURATION, ECG06: 94 MS
QTC CALCULATION (BEZET), ECG08: 422 MS
QTC CALCULATION (BEZET), ECG08: 442 MS
QTC CALCULATION (BEZET), ECG08: 465 MS
QTC CALCULATION (BEZET), ECG08: 481 MS
RBC # BLD AUTO: 2.76 M/UL (ref 4.1–5.7)
RBC # BLD AUTO: 2.76 X10E6/UL (ref 4.14–5.8)
RBC # BLD AUTO: 2.84 M/UL (ref 4.1–5.7)
RBC # BLD AUTO: 2.85 M/UL (ref 4.1–5.7)
RBC # BLD AUTO: 2.97 M/UL (ref 4.1–5.7)
RBC # BLD AUTO: 3 M/UL (ref 4.1–5.7)
RBC # BLD AUTO: 3.05 M/UL (ref 4.1–5.7)
RBC # BLD AUTO: 3.05 M/UL (ref 4.1–5.7)
RBC # BLD AUTO: 3.07 M/UL (ref 4.1–5.7)
RBC # BLD AUTO: 3.08 M/UL (ref 4.1–5.7)
RBC # BLD AUTO: 3.15 M/UL (ref 4.1–5.7)
RBC # BLD AUTO: 3.17 M/UL (ref 4.1–5.7)
RBC # BLD AUTO: 3.18 M/UL (ref 4.1–5.7)
RBC # BLD AUTO: 3.2 M/UL (ref 4.1–5.7)
RBC # BLD AUTO: 3.43 M/UL (ref 4.1–5.7)
RBC # FLD: >100 /CU MM
RBC # FLD: >100 /CU MM
RBC #/AREA URNS HPF: ABNORMAL /HPF (ref 0–5)
RBC MORPH BLD: ABNORMAL
SAMPLES BEING HELD,HOLD: NORMAL
SERVICE CMNT-IMP: NORMAL
SODIUM SERPL-SCNC: 136 MMOL/L (ref 136–145)
SODIUM SERPL-SCNC: 138 MMOL/L (ref 136–145)
SODIUM SERPL-SCNC: 139 MMOL/L (ref 136–145)
SODIUM SERPL-SCNC: 139 MMOL/L (ref 136–145)
SODIUM SERPL-SCNC: 140 MMOL/L (ref 136–145)
SODIUM SERPL-SCNC: 141 MMOL/L (ref 136–145)
SODIUM SERPL-SCNC: 141 MMOL/L (ref 136–145)
SODIUM SERPL-SCNC: 142 MMOL/L (ref 136–145)
SODIUM SERPL-SCNC: 142 MMOL/L (ref 136–145)
SODIUM SERPL-SCNC: 144 MMOL/L (ref 136–145)
SODIUM SERPL-SCNC: 144 MMOL/L (ref 136–145)
SODIUM SERPL-SCNC: 145 MMOL/L (ref 136–145)
SODIUM SERPL-SCNC: 145 MMOL/L (ref 136–145)
SODIUM SERPL-SCNC: 146 MMOL/L (ref 136–145)
SODIUM SERPL-SCNC: 146 MMOL/L (ref 136–145)
SP GR UR REFRACTOMETRY: 1.01 (ref 1–1.03)
SP GR UR REFRACTOMETRY: 1.02 (ref 1–1.03)
SPECIMEN SOURCE FLD: ABNORMAL
SPECIMEN SOURCE FLD: ABNORMAL
SPECIMEN SOURCE FLD: NORMAL
THERAPEUTIC RANGE,PTTT: ABNORMAL SECS (ref 58–77)
TRIGL SERPL-MCNC: 34 MG/DL (ref ?–150)
TROPONIN I SERPL-MCNC: <0.05 NG/ML
TSH SERPL DL<=0.05 MIU/L-ACNC: 4.08 UIU/ML (ref 0.36–3.74)
TSH SERPL DL<=0.05 MIU/L-ACNC: 5.35 UIU/ML (ref 0.36–3.74)
UR CULT HOLD, URHOLD: NORMAL
UR CULT HOLD, URHOLD: NORMAL
UROBILINOGEN UR QL STRIP.AUTO: 0.2 EU/DL (ref 0.2–1)
UROBILINOGEN UR QL STRIP.AUTO: 1 EU/DL (ref 0.2–1)
VENTRICULAR RATE, ECG03: 52 BPM
VENTRICULAR RATE, ECG03: 58 BPM
VENTRICULAR RATE, ECG03: 59 BPM
VENTRICULAR RATE, ECG03: 60 BPM
VIT B12 SERPL-MCNC: 558 PG/ML (ref 193–986)
VLDLC SERPL CALC-MCNC: 6.8 MG/DL
WBC # BLD AUTO: 11.5 K/UL (ref 4.1–11.1)
WBC # BLD AUTO: 12.3 K/UL (ref 4.1–11.1)
WBC # BLD AUTO: 4.8 K/UL (ref 4.1–11.1)
WBC # BLD AUTO: 4.9 K/UL (ref 4.1–11.1)
WBC # BLD AUTO: 4.9 K/UL (ref 4.1–11.1)
WBC # BLD AUTO: 5.2 K/UL (ref 4.1–11.1)
WBC # BLD AUTO: 5.5 K/UL (ref 4.1–11.1)
WBC # BLD AUTO: 6.2 K/UL (ref 4.1–11.1)
WBC # BLD AUTO: 6.3 K/UL (ref 4.1–11.1)
WBC # BLD AUTO: 6.5 X10E3/UL (ref 3.4–10.8)
WBC # BLD AUTO: 6.8 K/UL (ref 4.1–11.1)
WBC # BLD AUTO: 7.8 K/UL (ref 4.1–11.1)
WBC # BLD AUTO: 8.3 K/UL (ref 4.1–11.1)
WBC # BLD AUTO: 8.3 K/UL (ref 4.1–11.1)
WBC # BLD AUTO: 9.1 K/UL (ref 4.1–11.1)
WBC URNS QL MICRO: ABNORMAL /HPF (ref 0–4)

## 2019-01-01 PROCEDURE — 36415 COLL VENOUS BLD VENIPUNCTURE: CPT

## 2019-01-01 PROCEDURE — 74011250636 HC RX REV CODE- 250/636

## 2019-01-01 PROCEDURE — 99285 EMERGENCY DEPT VISIT HI MDM: CPT

## 2019-01-01 PROCEDURE — 85027 COMPLETE CBC AUTOMATED: CPT

## 2019-01-01 PROCEDURE — 84157 ASSAY OF PROTEIN OTHER: CPT

## 2019-01-01 PROCEDURE — C9113 INJ PANTOPRAZOLE SODIUM, VIA: HCPCS | Performed by: INTERNAL MEDICINE

## 2019-01-01 PROCEDURE — 88112 CYTOPATH CELL ENHANCE TECH: CPT

## 2019-01-01 PROCEDURE — 97535 SELF CARE MNGMENT TRAINING: CPT

## 2019-01-01 PROCEDURE — 83880 ASSAY OF NATRIURETIC PEPTIDE: CPT

## 2019-01-01 PROCEDURE — 85025 COMPLETE CBC W/AUTO DIFF WBC: CPT

## 2019-01-01 PROCEDURE — 74011250637 HC RX REV CODE- 250/637: Performed by: HOSPITALIST

## 2019-01-01 PROCEDURE — 82746 ASSAY OF FOLIC ACID SERUM: CPT

## 2019-01-01 PROCEDURE — 65660000001 HC RM ICU INTERMED STEPDOWN

## 2019-01-01 PROCEDURE — 74011250636 HC RX REV CODE- 250/636: Performed by: INTERNAL MEDICINE

## 2019-01-01 PROCEDURE — 71045 X-RAY EXAM CHEST 1 VIEW: CPT

## 2019-01-01 PROCEDURE — 84100 ASSAY OF PHOSPHORUS: CPT

## 2019-01-01 PROCEDURE — 87040 BLOOD CULTURE FOR BACTERIA: CPT

## 2019-01-01 PROCEDURE — 80048 BASIC METABOLIC PNL TOTAL CA: CPT

## 2019-01-01 PROCEDURE — 93005 ELECTROCARDIOGRAM TRACING: CPT

## 2019-01-01 PROCEDURE — 74011000258 HC RX REV CODE- 258: Performed by: FAMILY MEDICINE

## 2019-01-01 PROCEDURE — 84484 ASSAY OF TROPONIN QUANT: CPT

## 2019-01-01 PROCEDURE — 74230 X-RAY XM SWLNG FUNCJ C+: CPT

## 2019-01-01 PROCEDURE — 0W9B3ZZ DRAINAGE OF LEFT PLEURAL CAVITY, PERCUTANEOUS APPROACH: ICD-10-PCS | Performed by: RADIOLOGY

## 2019-01-01 PROCEDURE — 88184 FLOWCYTOMETRY/ TC 1 MARKER: CPT

## 2019-01-01 PROCEDURE — 74011250637 HC RX REV CODE- 250/637: Performed by: INTERNAL MEDICINE

## 2019-01-01 PROCEDURE — 82272 OCCULT BLD FECES 1-3 TESTS: CPT

## 2019-01-01 PROCEDURE — 74011250637 HC RX REV CODE- 250/637: Performed by: FAMILY MEDICINE

## 2019-01-01 PROCEDURE — 32555 ASPIRATE PLEURA W/ IMAGING: CPT

## 2019-01-01 PROCEDURE — 82945 GLUCOSE OTHER FLUID: CPT

## 2019-01-01 PROCEDURE — 99218 HC RM OBSERVATION: CPT

## 2019-01-01 PROCEDURE — 71250 CT THORAX DX C-: CPT

## 2019-01-01 PROCEDURE — 83735 ASSAY OF MAGNESIUM: CPT

## 2019-01-01 PROCEDURE — 83615 LACTATE (LD) (LDH) ENZYME: CPT

## 2019-01-01 PROCEDURE — C1729 CATH, DRAINAGE: HCPCS

## 2019-01-01 PROCEDURE — 77030028114 HC DRN KT PLEURX SYS BD -B

## 2019-01-01 PROCEDURE — 83605 ASSAY OF LACTIC ACID: CPT

## 2019-01-01 PROCEDURE — 76040000019: Performed by: THORACIC SURGERY (CARDIOTHORACIC VASCULAR SURGERY)

## 2019-01-01 PROCEDURE — 80053 COMPREHEN METABOLIC PANEL: CPT

## 2019-01-01 PROCEDURE — 93306 TTE W/DOPPLER COMPLETE: CPT

## 2019-01-01 PROCEDURE — 97161 PT EVAL LOW COMPLEX 20 MIN: CPT | Performed by: PHYSICAL THERAPIST

## 2019-01-01 PROCEDURE — 97116 GAIT TRAINING THERAPY: CPT

## 2019-01-01 PROCEDURE — P9047 ALBUMIN (HUMAN), 25%, 50ML: HCPCS | Performed by: FAMILY MEDICINE

## 2019-01-01 PROCEDURE — 82728 ASSAY OF FERRITIN: CPT

## 2019-01-01 PROCEDURE — 77030002996 HC SUT SLK J&J -A

## 2019-01-01 PROCEDURE — 74011250636 HC RX REV CODE- 250/636: Performed by: FAMILY MEDICINE

## 2019-01-01 PROCEDURE — 74011000250 HC RX REV CODE- 250: Performed by: INTERNAL MEDICINE

## 2019-01-01 PROCEDURE — 74011258636 HC RX REV CODE- 258/636: Performed by: HOSPITALIST

## 2019-01-01 PROCEDURE — 92526 ORAL FUNCTION THERAPY: CPT

## 2019-01-01 PROCEDURE — 76040000020: Performed by: THORACIC SURGERY (CARDIOTHORACIC VASCULAR SURGERY)

## 2019-01-01 PROCEDURE — 81003 URINALYSIS AUTO W/O SCOPE: CPT

## 2019-01-01 PROCEDURE — 74011250637 HC RX REV CODE- 250/637: Performed by: THORACIC SURGERY (CARDIOTHORACIC VASCULAR SURGERY)

## 2019-01-01 PROCEDURE — 76060000032 HC ANESTHESIA 0.5 TO 1 HR: Performed by: THORACIC SURGERY (CARDIOTHORACIC VASCULAR SURGERY)

## 2019-01-01 PROCEDURE — 74011250636 HC RX REV CODE- 250/636: Performed by: NURSE ANESTHETIST, CERTIFIED REGISTERED

## 2019-01-01 PROCEDURE — 85730 THROMBOPLASTIN TIME PARTIAL: CPT

## 2019-01-01 PROCEDURE — 88342 IMHCHEM/IMCYTCHM 1ST ANTB: CPT

## 2019-01-01 PROCEDURE — 97165 OT EVAL LOW COMPLEX 30 MIN: CPT

## 2019-01-01 PROCEDURE — 81001 URINALYSIS AUTO W/SCOPE: CPT

## 2019-01-01 PROCEDURE — 76770 US EXAM ABDO BACK WALL COMP: CPT

## 2019-01-01 PROCEDURE — 74011250636 HC RX REV CODE- 250/636: Performed by: HOSPITALIST

## 2019-01-01 PROCEDURE — 87205 SMEAR GRAM STAIN: CPT

## 2019-01-01 PROCEDURE — 71046 X-RAY EXAM CHEST 2 VIEWS: CPT

## 2019-01-01 PROCEDURE — 74011250637 HC RX REV CODE- 250/637: Performed by: NURSE PRACTITIONER

## 2019-01-01 PROCEDURE — 65660000000 HC RM CCU STEPDOWN

## 2019-01-01 PROCEDURE — 77030028127 HC DRN KT PLEURX SYS BD -D

## 2019-01-01 PROCEDURE — 97161 PT EVAL LOW COMPLEX 20 MIN: CPT

## 2019-01-01 PROCEDURE — A9552 F18 FDG: HCPCS

## 2019-01-01 PROCEDURE — 85018 HEMOGLOBIN: CPT

## 2019-01-01 PROCEDURE — 74011000250 HC RX REV CODE- 250: Performed by: NURSE ANESTHETIST, CERTIFIED REGISTERED

## 2019-01-01 PROCEDURE — 85610 PROTHROMBIN TIME: CPT

## 2019-01-01 PROCEDURE — 70450 CT HEAD/BRAIN W/O DYE: CPT

## 2019-01-01 PROCEDURE — 88305 TISSUE EXAM BY PATHOLOGIST: CPT

## 2019-01-01 PROCEDURE — 88365 INSITU HYBRIDIZATION (FISH): CPT

## 2019-01-01 PROCEDURE — 0DJ08ZZ INSPECTION OF UPPER INTESTINAL TRACT, VIA NATURAL OR ARTIFICIAL OPENING ENDOSCOPIC: ICD-10-PCS | Performed by: INTERNAL MEDICINE

## 2019-01-01 PROCEDURE — 76060000031 HC ANESTHESIA FIRST 0.5 HR: Performed by: INTERNAL MEDICINE

## 2019-01-01 PROCEDURE — 77030040922 HC BLNKT HYPOTHRM STRY -A

## 2019-01-01 PROCEDURE — 76040000019: Performed by: INTERNAL MEDICINE

## 2019-01-01 PROCEDURE — 84443 ASSAY THYROID STIM HORMONE: CPT

## 2019-01-01 PROCEDURE — 88185 FLOWCYTOMETRY/TC ADD-ON: CPT

## 2019-01-01 PROCEDURE — 77030005208 HC CATH HLDR GLWC -A

## 2019-01-01 PROCEDURE — 0W9B30Z DRAINAGE OF LEFT PLEURAL CAVITY WITH DRAINAGE DEVICE, PERCUTANEOUS APPROACH: ICD-10-PCS | Performed by: RADIOLOGY

## 2019-01-01 PROCEDURE — 82607 VITAMIN B-12: CPT

## 2019-01-01 PROCEDURE — 80061 LIPID PANEL: CPT

## 2019-01-01 PROCEDURE — 89050 BODY FLUID CELL COUNT: CPT

## 2019-01-01 PROCEDURE — 84155 ASSAY OF PROTEIN SERUM: CPT

## 2019-01-01 PROCEDURE — 92526 ORAL FUNCTION THERAPY: CPT | Performed by: SPEECH-LANGUAGE PATHOLOGIST

## 2019-01-01 PROCEDURE — 94760 N-INVAS EAR/PLS OXIMETRY 1: CPT

## 2019-01-01 PROCEDURE — 92610 EVALUATE SWALLOWING FUNCTION: CPT

## 2019-01-01 PROCEDURE — 83540 ASSAY OF IRON: CPT

## 2019-01-01 PROCEDURE — 74011000250 HC RX REV CODE- 250: Performed by: RADIOLOGY

## 2019-01-01 PROCEDURE — 83036 HEMOGLOBIN GLYCOSYLATED A1C: CPT

## 2019-01-01 PROCEDURE — 88341 IMHCHEM/IMCYTCHM EA ADD ANTB: CPT

## 2019-01-01 PROCEDURE — 92611 MOTION FLUOROSCOPY/SWALLOW: CPT

## 2019-01-01 RX ORDER — SODIUM CHLORIDE 0.9 % (FLUSH) 0.9 %
10 SYRINGE (ML) INJECTION
Status: COMPLETED | OUTPATIENT
Start: 2019-01-01 | End: 2019-01-01

## 2019-01-01 RX ORDER — ACETAMINOPHEN 325 MG/1
650 TABLET ORAL
Status: DISCONTINUED | OUTPATIENT
Start: 2019-01-01 | End: 2019-01-01 | Stop reason: HOSPADM

## 2019-01-01 RX ORDER — SODIUM CHLORIDE 0.9 % (FLUSH) 0.9 %
5-40 SYRINGE (ML) INJECTION AS NEEDED
Status: DISCONTINUED | OUTPATIENT
Start: 2019-01-01 | End: 2019-01-01 | Stop reason: HOSPADM

## 2019-01-01 RX ORDER — POTASSIUM CHLORIDE 750 MG/1
20 TABLET, FILM COATED, EXTENDED RELEASE ORAL DAILY
Status: DISCONTINUED | OUTPATIENT
Start: 2019-01-01 | End: 2019-01-01

## 2019-01-01 RX ORDER — METOLAZONE 5 MG/1
5 TABLET ORAL DAILY
Status: ON HOLD | COMMUNITY
Start: 2019-01-01 | End: 2019-01-01

## 2019-01-01 RX ORDER — SODIUM CHLORIDE 0.9 % (FLUSH) 0.9 %
5-40 SYRINGE (ML) INJECTION EVERY 8 HOURS
Status: DISCONTINUED | OUTPATIENT
Start: 2019-01-01 | End: 2019-01-01 | Stop reason: HOSPADM

## 2019-01-01 RX ORDER — CLONIDINE HYDROCHLORIDE 0.1 MG/1
0.1 TABLET ORAL
Status: COMPLETED | OUTPATIENT
Start: 2019-01-01 | End: 2019-01-01

## 2019-01-01 RX ORDER — LANOLIN ALCOHOL/MO/W.PET/CERES
100 CREAM (GRAM) TOPICAL DAILY
Status: DISCONTINUED | OUTPATIENT
Start: 2019-01-01 | End: 2019-01-01 | Stop reason: HOSPADM

## 2019-01-01 RX ORDER — MAGNESIUM SULFATE 1 G/100ML
1 INJECTION INTRAVENOUS ONCE
Status: COMPLETED | OUTPATIENT
Start: 2019-01-01 | End: 2019-01-01

## 2019-01-01 RX ORDER — HYDRALAZINE HYDROCHLORIDE 25 MG/1
25 TABLET, FILM COATED ORAL 3 TIMES DAILY
Status: DISCONTINUED | OUTPATIENT
Start: 2019-01-01 | End: 2019-01-01 | Stop reason: HOSPADM

## 2019-01-01 RX ORDER — PANTOPRAZOLE SODIUM 40 MG/1
40 TABLET, DELAYED RELEASE ORAL
Status: DISCONTINUED | OUTPATIENT
Start: 2019-01-01 | End: 2019-01-01 | Stop reason: HOSPADM

## 2019-01-01 RX ORDER — TRIAMCINOLONE ACETONIDE 1 MG/G
CREAM TOPICAL
COMMUNITY

## 2019-01-01 RX ORDER — NALOXONE HYDROCHLORIDE 0.4 MG/ML
0.4 INJECTION, SOLUTION INTRAMUSCULAR; INTRAVENOUS; SUBCUTANEOUS
Status: DISCONTINUED | OUTPATIENT
Start: 2019-01-01 | End: 2019-01-01 | Stop reason: HOSPADM

## 2019-01-01 RX ORDER — DEXTROSE MONOHYDRATE AND SODIUM CHLORIDE 5; .9 G/100ML; G/100ML
100 INJECTION, SOLUTION INTRAVENOUS CONTINUOUS
Status: DISCONTINUED | OUTPATIENT
Start: 2019-01-01 | End: 2019-01-01 | Stop reason: HOSPADM

## 2019-01-01 RX ORDER — HYDRALAZINE HYDROCHLORIDE 50 MG/1
50 TABLET, FILM COATED ORAL 3 TIMES DAILY
Qty: 90 TAB | Refills: 0 | Status: ON HOLD | OUTPATIENT
Start: 2019-01-01 | End: 2019-01-01

## 2019-01-01 RX ORDER — MELATONIN
2000 DAILY
Status: DISCONTINUED | OUTPATIENT
Start: 2019-01-01 | End: 2019-01-01 | Stop reason: HOSPADM

## 2019-01-01 RX ORDER — SODIUM CHLORIDE 9 MG/ML
INJECTION, SOLUTION INTRAVENOUS
Status: DISCONTINUED | OUTPATIENT
Start: 2019-01-01 | End: 2019-01-01 | Stop reason: HOSPADM

## 2019-01-01 RX ORDER — BISACODYL 5 MG
5 TABLET, DELAYED RELEASE (ENTERIC COATED) ORAL DAILY PRN
Status: DISCONTINUED | OUTPATIENT
Start: 2019-01-01 | End: 2019-01-01 | Stop reason: HOSPADM

## 2019-01-01 RX ORDER — HYDRALAZINE HYDROCHLORIDE 50 MG/1
50 TABLET, FILM COATED ORAL 3 TIMES DAILY
Status: DISCONTINUED | OUTPATIENT
Start: 2019-01-01 | End: 2019-01-01 | Stop reason: HOSPADM

## 2019-01-01 RX ORDER — LISINOPRIL 20 MG/1
20 TABLET ORAL DAILY
Qty: 30 TAB | Refills: 0 | Status: SHIPPED | OUTPATIENT
Start: 2019-01-01 | End: 2019-01-01 | Stop reason: SDUPTHER

## 2019-01-01 RX ORDER — POTASSIUM CHLORIDE 750 MG/1
20 TABLET, FILM COATED, EXTENDED RELEASE ORAL DAILY
Status: DISCONTINUED | OUTPATIENT
Start: 2019-01-01 | End: 2019-01-01 | Stop reason: HOSPADM

## 2019-01-01 RX ORDER — HYDRALAZINE HYDROCHLORIDE 20 MG/ML
10 INJECTION INTRAMUSCULAR; INTRAVENOUS
Status: DISCONTINUED | OUTPATIENT
Start: 2019-01-01 | End: 2019-01-01 | Stop reason: HOSPADM

## 2019-01-01 RX ORDER — DOXAZOSIN 2 MG/1
1 TABLET ORAL DAILY
Status: DISCONTINUED | OUTPATIENT
Start: 2019-01-01 | End: 2019-01-01

## 2019-01-01 RX ORDER — SODIUM CHLORIDE, SODIUM LACTATE, POTASSIUM CHLORIDE, CALCIUM CHLORIDE 600; 310; 30; 20 MG/100ML; MG/100ML; MG/100ML; MG/100ML
INJECTION, SOLUTION INTRAVENOUS
Status: DISCONTINUED | OUTPATIENT
Start: 2019-01-01 | End: 2019-01-01 | Stop reason: HOSPADM

## 2019-01-01 RX ORDER — METOLAZONE 5 MG/1
5 TABLET ORAL DAILY
Qty: 3 TAB | Refills: 0 | Status: SHIPPED | OUTPATIENT
Start: 2019-01-01 | End: 2019-01-01

## 2019-01-01 RX ORDER — FLUMAZENIL 0.1 MG/ML
0.2 INJECTION INTRAVENOUS
Status: DISCONTINUED | OUTPATIENT
Start: 2019-01-01 | End: 2019-01-01 | Stop reason: HOSPADM

## 2019-01-01 RX ORDER — FUROSEMIDE 10 MG/ML
80 INJECTION INTRAMUSCULAR; INTRAVENOUS 2 TIMES DAILY
Status: DISCONTINUED | OUTPATIENT
Start: 2019-01-01 | End: 2019-01-01

## 2019-01-01 RX ORDER — HYDROCODONE BITARTRATE AND ACETAMINOPHEN 5; 325 MG/1; MG/1
1 TABLET ORAL
Status: DISCONTINUED | OUTPATIENT
Start: 2019-01-01 | End: 2019-01-01 | Stop reason: HOSPADM

## 2019-01-01 RX ORDER — FENTANYL CITRATE 50 UG/ML
INJECTION, SOLUTION INTRAMUSCULAR; INTRAVENOUS AS NEEDED
Status: DISCONTINUED | OUTPATIENT
Start: 2019-01-01 | End: 2019-01-01 | Stop reason: HOSPADM

## 2019-01-01 RX ORDER — DIPHENHYDRAMINE HCL 25 MG
25 CAPSULE ORAL
Status: DISCONTINUED | OUTPATIENT
Start: 2019-01-01 | End: 2019-01-01 | Stop reason: HOSPADM

## 2019-01-01 RX ORDER — FUROSEMIDE 80 MG/1
80 TABLET ORAL DAILY
Status: ON HOLD | COMMUNITY
End: 2019-01-01 | Stop reason: SDUPTHER

## 2019-01-01 RX ORDER — FUROSEMIDE 80 MG/1
40 TABLET ORAL 2 TIMES DAILY
Qty: 30 TAB | Refills: 0 | Status: SHIPPED | OUTPATIENT
Start: 2019-01-01 | End: 2019-01-01 | Stop reason: SDUPTHER

## 2019-01-01 RX ORDER — POTASSIUM CHLORIDE 750 MG/1
20 TABLET, FILM COATED, EXTENDED RELEASE ORAL
Status: COMPLETED | OUTPATIENT
Start: 2019-01-01 | End: 2019-01-01

## 2019-01-01 RX ORDER — LANOLIN ALCOHOL/MO/W.PET/CERES
325 CREAM (GRAM) TOPICAL 2 TIMES DAILY
COMMUNITY

## 2019-01-01 RX ORDER — DOXAZOSIN 2 MG/1
2 TABLET ORAL DAILY
Status: DISCONTINUED | OUTPATIENT
Start: 2019-01-01 | End: 2019-01-01

## 2019-01-01 RX ORDER — PROCHLORPERAZINE EDISYLATE 5 MG/ML
5 INJECTION INTRAMUSCULAR; INTRAVENOUS
Status: DISCONTINUED | OUTPATIENT
Start: 2019-01-01 | End: 2019-01-01 | Stop reason: SDUPTHER

## 2019-01-01 RX ORDER — POTASSIUM CHLORIDE 750 MG/1
40 TABLET, FILM COATED, EXTENDED RELEASE ORAL EVERY 4 HOURS
Status: DISPENSED | OUTPATIENT
Start: 2019-01-01 | End: 2019-01-01

## 2019-01-01 RX ORDER — LANOLIN ALCOHOL/MO/W.PET/CERES
325 CREAM (GRAM) TOPICAL 2 TIMES DAILY
Status: DISCONTINUED | OUTPATIENT
Start: 2019-01-01 | End: 2019-01-01 | Stop reason: HOSPADM

## 2019-01-01 RX ORDER — FUROSEMIDE 10 MG/ML
80 INJECTION INTRAMUSCULAR; INTRAVENOUS DAILY
Status: DISCONTINUED | OUTPATIENT
Start: 2019-01-01 | End: 2019-01-01

## 2019-01-01 RX ORDER — GUAIFENESIN DEXTROMETHORPHAN HYDROBROMIDE ORAL SOLUTION 10; 100 MG/5ML; MG/5ML
30 SOLUTION ORAL
COMMUNITY

## 2019-01-01 RX ORDER — HYDRALAZINE HYDROCHLORIDE 50 MG/1
50 TABLET, FILM COATED ORAL 3 TIMES DAILY
Status: DISCONTINUED | OUTPATIENT
Start: 2019-01-01 | End: 2019-01-01

## 2019-01-01 RX ORDER — BUPIVACAINE HYDROCHLORIDE 2.5 MG/ML
INJECTION, SOLUTION EPIDURAL; INFILTRATION; INTRACAUDAL
Status: DISPENSED
Start: 2019-01-01 | End: 2019-01-01

## 2019-01-01 RX ORDER — POTASSIUM CHLORIDE 7.45 MG/ML
10 INJECTION INTRAVENOUS
Status: COMPLETED | OUTPATIENT
Start: 2019-01-01 | End: 2019-01-01

## 2019-01-01 RX ORDER — DUTASTERIDE 0.5 MG/1
0.5 CAPSULE, LIQUID FILLED ORAL DAILY
Status: DISCONTINUED | OUTPATIENT
Start: 2019-01-01 | End: 2019-01-01 | Stop reason: HOSPADM

## 2019-01-01 RX ORDER — LOPERAMIDE HYDROCHLORIDE 2 MG/1
2 CAPSULE ORAL DAILY
Status: DISCONTINUED | OUTPATIENT
Start: 2019-01-01 | End: 2019-01-01

## 2019-01-01 RX ORDER — PROPOFOL 10 MG/ML
INJECTION, EMULSION INTRAVENOUS AS NEEDED
Status: DISCONTINUED | OUTPATIENT
Start: 2019-01-01 | End: 2019-01-01 | Stop reason: HOSPADM

## 2019-01-01 RX ORDER — POTASSIUM CHLORIDE 1500 MG/1
20 TABLET, FILM COATED, EXTENDED RELEASE ORAL DAILY
Qty: 30 TAB | Refills: 0 | Status: SHIPPED | OUTPATIENT
Start: 2019-01-01 | End: 2019-01-01

## 2019-01-01 RX ORDER — DOXAZOSIN 2 MG/1
1 TABLET ORAL
Status: DISCONTINUED | OUTPATIENT
Start: 2019-01-01 | End: 2019-01-01 | Stop reason: HOSPADM

## 2019-01-01 RX ORDER — EPINEPHRINE 0.1 MG/ML
1 INJECTION INTRACARDIAC; INTRAVENOUS
Status: DISCONTINUED | OUTPATIENT
Start: 2019-01-01 | End: 2019-01-01 | Stop reason: HOSPADM

## 2019-01-01 RX ORDER — FUROSEMIDE 10 MG/ML
40 INJECTION INTRAMUSCULAR; INTRAVENOUS DAILY
Status: DISCONTINUED | OUTPATIENT
Start: 2019-01-01 | End: 2019-01-01

## 2019-01-01 RX ORDER — DEXTROMETHORPHAN/PSEUDOEPHED 2.5-7.5/.8
1.2 DROPS ORAL
Status: DISCONTINUED | OUTPATIENT
Start: 2019-01-01 | End: 2019-01-01 | Stop reason: HOSPADM

## 2019-01-01 RX ORDER — OMEPRAZOLE 20 MG/1
20 CAPSULE, DELAYED RELEASE ORAL 2 TIMES DAILY
COMMUNITY

## 2019-01-01 RX ORDER — POTASSIUM CHLORIDE 750 MG/1
20 TABLET, FILM COATED, EXTENDED RELEASE ORAL DAILY
COMMUNITY

## 2019-01-01 RX ORDER — LOPERAMIDE HYDROCHLORIDE 2 MG/1
2 CAPSULE ORAL
Status: DISCONTINUED | OUTPATIENT
Start: 2019-01-01 | End: 2019-01-01 | Stop reason: HOSPADM

## 2019-01-01 RX ORDER — GLYCOPYRROLATE 0.2 MG/ML
INJECTION INTRAMUSCULAR; INTRAVENOUS AS NEEDED
Status: DISCONTINUED | OUTPATIENT
Start: 2019-01-01 | End: 2019-01-01 | Stop reason: HOSPADM

## 2019-01-01 RX ORDER — HYDROMORPHONE HYDROCHLORIDE 1 MG/ML
0.5 INJECTION, SOLUTION INTRAMUSCULAR; INTRAVENOUS; SUBCUTANEOUS
Status: DISCONTINUED | OUTPATIENT
Start: 2019-01-01 | End: 2019-01-01 | Stop reason: HOSPADM

## 2019-01-01 RX ORDER — LIDOCAINE HYDROCHLORIDE 20 MG/ML
INJECTION, SOLUTION EPIDURAL; INFILTRATION; INTRACAUDAL; PERINEURAL AS NEEDED
Status: DISCONTINUED | OUTPATIENT
Start: 2019-01-01 | End: 2019-01-01 | Stop reason: HOSPADM

## 2019-01-01 RX ORDER — HYDRALAZINE HYDROCHLORIDE 25 MG/1
12.5 TABLET, FILM COATED ORAL 3 TIMES DAILY
Status: DISCONTINUED | OUTPATIENT
Start: 2019-01-01 | End: 2019-01-01

## 2019-01-01 RX ORDER — DOXAZOSIN 2 MG/1
10 TABLET ORAL
Status: DISCONTINUED | OUTPATIENT
Start: 2019-01-01 | End: 2019-01-01 | Stop reason: HOSPADM

## 2019-01-01 RX ORDER — METOLAZONE 5 MG/1
5 TABLET ORAL DAILY
Status: DISCONTINUED | OUTPATIENT
Start: 2019-01-01 | End: 2019-01-01

## 2019-01-01 RX ORDER — SODIUM CHLORIDE 0.9 % (FLUSH) 0.9 %
SYRINGE (ML) INJECTION
Status: DISPENSED
Start: 2019-01-01 | End: 2019-01-01

## 2019-01-01 RX ORDER — ONDANSETRON 2 MG/ML
4 INJECTION INTRAMUSCULAR; INTRAVENOUS
Status: DISCONTINUED | OUTPATIENT
Start: 2019-01-01 | End: 2019-01-01 | Stop reason: HOSPADM

## 2019-01-01 RX ORDER — DIPHENHYDRAMINE HCL 25 MG
12.5 TABLET ORAL
COMMUNITY

## 2019-01-01 RX ORDER — SODIUM CHLORIDE 9 MG/ML
50 INJECTION, SOLUTION INTRAVENOUS CONTINUOUS
Status: DISPENSED | OUTPATIENT
Start: 2019-01-01 | End: 2019-01-01

## 2019-01-01 RX ORDER — ATROPINE SULFATE 0.1 MG/ML
0.5 INJECTION INTRAVENOUS
Status: DISCONTINUED | OUTPATIENT
Start: 2019-01-01 | End: 2019-01-01 | Stop reason: HOSPADM

## 2019-01-01 RX ORDER — LISINOPRIL 20 MG/1
20 TABLET ORAL DAILY
Qty: 30 TAB | Refills: 0 | Status: SHIPPED | OUTPATIENT
Start: 2019-01-01 | End: 2019-01-01

## 2019-01-01 RX ORDER — LANOLIN ALCOHOL/MO/W.PET/CERES
100 CREAM (GRAM) TOPICAL DAILY
COMMUNITY

## 2019-01-01 RX ORDER — ACETAMINOPHEN, DIPHENHYDRAMINE HCL, PHENYLEPHRINE HCL 325; 25; 5 MG/1; MG/1; MG/1
1 TABLET ORAL
COMMUNITY

## 2019-01-01 RX ORDER — FENTANYL CITRATE 50 UG/ML
25-200 INJECTION, SOLUTION INTRAMUSCULAR; INTRAVENOUS
Status: DISCONTINUED | OUTPATIENT
Start: 2019-01-01 | End: 2019-01-01 | Stop reason: HOSPADM

## 2019-01-01 RX ORDER — LOPERAMIDE HYDROCHLORIDE 2 MG/1
2 CAPSULE ORAL
COMMUNITY

## 2019-01-01 RX ORDER — FUROSEMIDE 10 MG/ML
40 INJECTION INTRAMUSCULAR; INTRAVENOUS 2 TIMES DAILY
Status: DISCONTINUED | OUTPATIENT
Start: 2019-01-01 | End: 2019-01-01

## 2019-01-01 RX ORDER — HYDRALAZINE HYDROCHLORIDE 25 MG/1
12.5 TABLET, FILM COATED ORAL 3 TIMES DAILY
Status: ON HOLD | COMMUNITY
End: 2019-01-01 | Stop reason: SDUPTHER

## 2019-01-01 RX ORDER — CHOLECALCIFEROL (VITAMIN D3) 125 MCG
1 CAPSULE ORAL DAILY
COMMUNITY

## 2019-01-01 RX ORDER — AMOXICILLIN AND CLAVULANATE POTASSIUM 500; 125 MG/1; MG/1
1 TABLET, FILM COATED ORAL 2 TIMES DAILY
Qty: 10 TAB | Refills: 0 | Status: SHIPPED | OUTPATIENT
Start: 2019-01-01 | End: 2019-01-01

## 2019-01-01 RX ORDER — LISINOPRIL 40 MG/1
40 TABLET ORAL DAILY
Status: ON HOLD | COMMUNITY
End: 2019-01-01 | Stop reason: SDUPTHER

## 2019-01-01 RX ORDER — POTASSIUM CHLORIDE 750 MG/1
40 TABLET, FILM COATED, EXTENDED RELEASE ORAL DAILY
Status: DISCONTINUED | OUTPATIENT
Start: 2019-01-01 | End: 2019-01-01 | Stop reason: HOSPADM

## 2019-01-01 RX ORDER — LANOLIN ALCOHOL/MO/W.PET/CERES
9 CREAM (GRAM) TOPICAL
Status: DISCONTINUED | OUTPATIENT
Start: 2019-01-01 | End: 2019-01-01 | Stop reason: HOSPADM

## 2019-01-01 RX ORDER — VERAPAMIL HYDROCHLORIDE 180 MG/1
180 TABLET, EXTENDED RELEASE ORAL DAILY
Status: DISCONTINUED | OUTPATIENT
Start: 2019-01-01 | End: 2019-01-01

## 2019-01-01 RX ORDER — SODIUM BICARBONATE 42 MG/ML
1 INJECTION, SOLUTION INTRAVENOUS
Status: COMPLETED | OUTPATIENT
Start: 2019-01-01 | End: 2019-01-01

## 2019-01-01 RX ORDER — FUROSEMIDE 40 MG/1
40 TABLET ORAL 2 TIMES DAILY
Status: DISCONTINUED | OUTPATIENT
Start: 2019-01-01 | End: 2019-01-01 | Stop reason: HOSPADM

## 2019-01-01 RX ORDER — CLONIDINE HYDROCHLORIDE 0.1 MG/1
0.1 TABLET ORAL ONCE
Status: DISCONTINUED | OUTPATIENT
Start: 2019-01-01 | End: 2019-01-01

## 2019-01-01 RX ORDER — HYDRALAZINE HYDROCHLORIDE 25 MG/1
25 TABLET, FILM COATED ORAL 3 TIMES DAILY
Qty: 90 TAB | Refills: 0 | Status: SHIPPED | OUTPATIENT
Start: 2019-01-01 | End: 2019-01-01

## 2019-01-01 RX ORDER — SODIUM BICARBONATE 42 MG/ML
1 INJECTION, SOLUTION INTRAVENOUS
Status: DISCONTINUED | OUTPATIENT
Start: 2019-01-01 | End: 2019-01-01

## 2019-01-01 RX ORDER — POTASSIUM CHLORIDE 1500 MG/1
20 TABLET, FILM COATED, EXTENDED RELEASE ORAL DAILY
Qty: 30 TAB | Refills: 0 | Status: SHIPPED | OUTPATIENT
Start: 2019-01-01 | End: 2019-01-01 | Stop reason: SDUPTHER

## 2019-01-01 RX ORDER — PANTOPRAZOLE SODIUM 40 MG/1
40 TABLET, DELAYED RELEASE ORAL 2 TIMES DAILY
Status: DISCONTINUED | OUTPATIENT
Start: 2019-01-01 | End: 2019-01-01 | Stop reason: HOSPADM

## 2019-01-01 RX ORDER — HYDRALAZINE HYDROCHLORIDE 25 MG/1
25 TABLET, FILM COATED ORAL 3 TIMES DAILY
Qty: 90 TAB | Refills: 0 | Status: SHIPPED | OUTPATIENT
Start: 2019-01-01 | End: 2019-01-01 | Stop reason: SDUPTHER

## 2019-01-01 RX ORDER — LISINOPRIL 20 MG/1
40 TABLET ORAL DAILY
Status: DISCONTINUED | OUTPATIENT
Start: 2019-01-01 | End: 2019-01-01

## 2019-01-01 RX ORDER — LISINOPRIL 20 MG/1
20 TABLET ORAL DAILY
Status: DISCONTINUED | OUTPATIENT
Start: 2019-01-01 | End: 2019-01-01 | Stop reason: HOSPADM

## 2019-01-01 RX ORDER — VERAPAMIL HYDROCHLORIDE 180 MG/1
90 TABLET, EXTENDED RELEASE ORAL DAILY
COMMUNITY
End: 2019-01-01

## 2019-01-01 RX ORDER — VERAPAMIL HYDROCHLORIDE 180 MG/1
180 TABLET, EXTENDED RELEASE ORAL DAILY
Status: ON HOLD | COMMUNITY
End: 2019-01-01 | Stop reason: SDUPTHER

## 2019-01-01 RX ORDER — LEVOTHYROXINE SODIUM 175 UG/1
175 TABLET ORAL
COMMUNITY

## 2019-01-01 RX ORDER — FUROSEMIDE 40 MG/1
40 TABLET ORAL 2 TIMES DAILY
COMMUNITY

## 2019-01-01 RX ORDER — FUROSEMIDE 40 MG/1
40 TABLET ORAL 2 TIMES DAILY
Qty: 60 TAB | Refills: 0 | Status: SHIPPED | OUTPATIENT
Start: 2019-01-01 | End: 2019-01-01

## 2019-01-01 RX ORDER — HYDRALAZINE HYDROCHLORIDE 25 MG/1
25 TABLET, FILM COATED ORAL 3 TIMES DAILY
Status: DISCONTINUED | OUTPATIENT
Start: 2019-01-01 | End: 2019-01-01

## 2019-01-01 RX ORDER — DOXAZOSIN 1 MG/1
1 TABLET ORAL DAILY
Status: DISCONTINUED | OUTPATIENT
Start: 2019-01-01 | End: 2019-01-01 | Stop reason: HOSPADM

## 2019-01-01 RX ORDER — QUETIAPINE FUMARATE 25 MG/1
25 TABLET, FILM COATED ORAL ONCE
Status: COMPLETED | OUTPATIENT
Start: 2019-01-01 | End: 2019-01-01

## 2019-01-01 RX ORDER — VERAPAMIL HYDROCHLORIDE 180 MG/1
90 TABLET, EXTENDED RELEASE ORAL DAILY
Qty: 15 TAB | Refills: 0 | Status: SHIPPED | OUTPATIENT
Start: 2019-01-01 | End: 2019-01-01

## 2019-01-01 RX ORDER — VERAPAMIL HYDROCHLORIDE 180 MG/1
90 TABLET, EXTENDED RELEASE ORAL
Status: DISCONTINUED | OUTPATIENT
Start: 2019-01-01 | End: 2019-01-01 | Stop reason: HOSPADM

## 2019-01-01 RX ORDER — ALBUMIN HUMAN 250 G/1000ML
12.5 SOLUTION INTRAVENOUS EVERY 6 HOURS
Status: COMPLETED | OUTPATIENT
Start: 2019-01-01 | End: 2019-01-01

## 2019-01-01 RX ORDER — FUROSEMIDE 10 MG/ML
60 INJECTION INTRAMUSCULAR; INTRAVENOUS ONCE
Status: COMPLETED | OUTPATIENT
Start: 2019-01-01 | End: 2019-01-01

## 2019-01-01 RX ADMIN — SODIUM CHLORIDE, POTASSIUM CHLORIDE, SODIUM LACTATE AND CALCIUM CHLORIDE: 600; 310; 30; 20 INJECTION, SOLUTION INTRAVENOUS at 13:41

## 2019-01-01 RX ADMIN — FUROSEMIDE 60 MG: 10 INJECTION, SOLUTION INTRAMUSCULAR; INTRAVENOUS at 16:02

## 2019-01-01 RX ADMIN — HYDRALAZINE HYDROCHLORIDE 25 MG: 25 TABLET, FILM COATED ORAL at 16:42

## 2019-01-01 RX ADMIN — Medication 10 ML: at 22:39

## 2019-01-01 RX ADMIN — FERROUS SULFATE TAB 325 MG (65 MG ELEMENTAL FE) 325 MG: 325 (65 FE) TAB at 08:53

## 2019-01-01 RX ADMIN — APIXABAN 2.5 MG: 2.5 TABLET, FILM COATED ORAL at 21:05

## 2019-01-01 RX ADMIN — SODIUM CHLORIDE 40 MG: 9 INJECTION INTRAMUSCULAR; INTRAVENOUS; SUBCUTANEOUS at 03:39

## 2019-01-01 RX ADMIN — Medication 5 ML: at 22:00

## 2019-01-01 RX ADMIN — SODIUM BICARBONATE 2 ML: 42 INJECTION, SOLUTION INTRAVENOUS at 16:34

## 2019-01-01 RX ADMIN — SODIUM CHLORIDE 40 MG: 9 INJECTION INTRAMUSCULAR; INTRAVENOUS; SUBCUTANEOUS at 05:59

## 2019-01-01 RX ADMIN — HYDRALAZINE HYDROCHLORIDE 50 MG: 50 TABLET ORAL at 22:34

## 2019-01-01 RX ADMIN — Medication 10 ML: at 20:42

## 2019-01-01 RX ADMIN — FUROSEMIDE 80 MG: 10 INJECTION, SOLUTION INTRAMUSCULAR; INTRAVENOUS at 19:06

## 2019-01-01 RX ADMIN — Medication 10 ML: at 06:00

## 2019-01-01 RX ADMIN — HYDRALAZINE HYDROCHLORIDE 10 MG: 20 INJECTION INTRAMUSCULAR; INTRAVENOUS at 17:34

## 2019-01-01 RX ADMIN — VITAMIN D, TAB 1000IU (100/BT) 2000 UNITS: 25 TAB at 08:37

## 2019-01-01 RX ADMIN — HYDRALAZINE HYDROCHLORIDE 10 MG: 20 INJECTION INTRAMUSCULAR; INTRAVENOUS at 11:30

## 2019-01-01 RX ADMIN — POTASSIUM CHLORIDE 40 MEQ: 750 TABLET, EXTENDED RELEASE ORAL at 08:26

## 2019-01-01 RX ADMIN — PROPOFOL 20 MG: 10 INJECTION, EMULSION INTRAVENOUS at 14:12

## 2019-01-01 RX ADMIN — Medication 10 ML: at 22:00

## 2019-01-01 RX ADMIN — VITAMIN D, TAB 1000IU (100/BT) 2000 UNITS: 25 TAB at 09:11

## 2019-01-01 RX ADMIN — PANTOPRAZOLE SODIUM 40 MG: 40 TABLET, DELAYED RELEASE ORAL at 08:26

## 2019-01-01 RX ADMIN — HYDRALAZINE HYDROCHLORIDE 25 MG: 25 TABLET, FILM COATED ORAL at 11:01

## 2019-01-01 RX ADMIN — SODIUM CHLORIDE 40 MG: 9 INJECTION INTRAMUSCULAR; INTRAVENOUS; SUBCUTANEOUS at 03:26

## 2019-01-01 RX ADMIN — VITAMIN D, TAB 1000IU (100/BT) 2000 UNITS: 25 TAB at 08:57

## 2019-01-01 RX ADMIN — DOXAZOSIN 1 MG: 2 TABLET ORAL at 21:08

## 2019-01-01 RX ADMIN — Medication 100 MG: at 09:00

## 2019-01-01 RX ADMIN — LISINOPRIL 40 MG: 20 TABLET ORAL at 08:51

## 2019-01-01 RX ADMIN — LISINOPRIL 20 MG: 20 TABLET ORAL at 09:30

## 2019-01-01 RX ADMIN — LEVOTHYROXINE SODIUM 175 MCG: 150 TABLET ORAL at 07:57

## 2019-01-01 RX ADMIN — HYDRALAZINE HYDROCHLORIDE 25 MG: 25 TABLET, FILM COATED ORAL at 22:07

## 2019-01-01 RX ADMIN — APIXABAN 2.5 MG: 2.5 TABLET, FILM COATED ORAL at 11:48

## 2019-01-01 RX ADMIN — Medication 10 ML: at 22:07

## 2019-01-01 RX ADMIN — DOXAZOSIN 10 MG: 2 TABLET ORAL at 22:34

## 2019-01-01 RX ADMIN — Medication 100 MG: at 11:57

## 2019-01-01 RX ADMIN — SODIUM CHLORIDE, POTASSIUM CHLORIDE, SODIUM LACTATE AND CALCIUM CHLORIDE: 600; 310; 30; 20 INJECTION, SOLUTION INTRAVENOUS at 14:05

## 2019-01-01 RX ADMIN — HYDRALAZINE HYDROCHLORIDE 25 MG: 25 TABLET, FILM COATED ORAL at 16:16

## 2019-01-01 RX ADMIN — LEVOTHYROXINE SODIUM 175 MCG: 150 TABLET ORAL at 08:52

## 2019-01-01 RX ADMIN — Medication 10 ML: at 15:24

## 2019-01-01 RX ADMIN — HYDRALAZINE HYDROCHLORIDE 25 MG: 25 TABLET, FILM COATED ORAL at 21:53

## 2019-01-01 RX ADMIN — Medication 9 MG: at 22:17

## 2019-01-01 RX ADMIN — HYDRALAZINE HYDROCHLORIDE 25 MG: 25 TABLET, FILM COATED ORAL at 21:00

## 2019-01-01 RX ADMIN — FUROSEMIDE 40 MG: 40 TABLET ORAL at 09:11

## 2019-01-01 RX ADMIN — GLYCOPYRROLATE 0.2 MG: 0.2 INJECTION, SOLUTION INTRAMUSCULAR; INTRAVENOUS at 14:24

## 2019-01-01 RX ADMIN — GLYCOPYRROLATE 0.2 MG: 0.2 INJECTION, SOLUTION INTRAMUSCULAR; INTRAVENOUS at 14:26

## 2019-01-01 RX ADMIN — HYDRALAZINE HYDROCHLORIDE 25 MG: 25 TABLET, FILM COATED ORAL at 08:58

## 2019-01-01 RX ADMIN — Medication 10 ML: at 14:00

## 2019-01-01 RX ADMIN — DUTASTERIDE 0.5 MG: 0.5 CAPSULE, LIQUID FILLED ORAL at 09:42

## 2019-01-01 RX ADMIN — FUROSEMIDE 40 MG: 40 TABLET ORAL at 18:39

## 2019-01-01 RX ADMIN — HYDRALAZINE HYDROCHLORIDE 25 MG: 25 TABLET, FILM COATED ORAL at 08:26

## 2019-01-01 RX ADMIN — HYDRALAZINE HYDROCHLORIDE 10 MG: 20 INJECTION INTRAMUSCULAR; INTRAVENOUS at 00:55

## 2019-01-01 RX ADMIN — HYDRALAZINE HYDROCHLORIDE 25 MG: 25 TABLET, FILM COATED ORAL at 08:38

## 2019-01-01 RX ADMIN — APIXABAN 2.5 MG: 2.5 TABLET, FILM COATED ORAL at 17:03

## 2019-01-01 RX ADMIN — Medication 100 MG: at 09:25

## 2019-01-01 RX ADMIN — FUROSEMIDE 80 MG: 10 INJECTION, SOLUTION INTRAMUSCULAR; INTRAVENOUS at 08:39

## 2019-01-01 RX ADMIN — LIDOCAINE HYDROCHLORIDE 80 MG: 20 INJECTION, SOLUTION EPIDURAL; INFILTRATION; INTRACAUDAL; PERINEURAL at 14:26

## 2019-01-01 RX ADMIN — PIPERACILLIN AND TAZOBACTAM 3.38 G: 3; .375 INJECTION, POWDER, FOR SOLUTION INTRAVENOUS at 17:22

## 2019-01-01 RX ADMIN — POTASSIUM CHLORIDE 20 MEQ: 750 TABLET, EXTENDED RELEASE ORAL at 16:16

## 2019-01-01 RX ADMIN — DUTASTERIDE 0.5 MG: 0.5 CAPSULE, LIQUID FILLED ORAL at 11:57

## 2019-01-01 RX ADMIN — HYDRALAZINE HYDROCHLORIDE 25 MG: 25 TABLET, FILM COATED ORAL at 09:30

## 2019-01-01 RX ADMIN — HYDRALAZINE HYDROCHLORIDE 25 MG: 25 TABLET, FILM COATED ORAL at 21:05

## 2019-01-01 RX ADMIN — LEVOTHYROXINE SODIUM 175 MCG: 150 TABLET ORAL at 07:20

## 2019-01-01 RX ADMIN — VERAPAMIL HYDROCHLORIDE 90 MG: 180 TABLET, FILM COATED, EXTENDED RELEASE ORAL at 11:01

## 2019-01-01 RX ADMIN — POTASSIUM CHLORIDE 20 MEQ: 750 TABLET, FILM COATED, EXTENDED RELEASE ORAL at 09:23

## 2019-01-01 RX ADMIN — HYDRALAZINE HYDROCHLORIDE 10 MG: 20 INJECTION INTRAMUSCULAR; INTRAVENOUS at 01:14

## 2019-01-01 RX ADMIN — HYDRALAZINE HYDROCHLORIDE 25 MG: 25 TABLET, FILM COATED ORAL at 09:11

## 2019-01-01 RX ADMIN — LEVOTHYROXINE SODIUM 175 MCG: 150 TABLET ORAL at 07:15

## 2019-01-01 RX ADMIN — DUTASTERIDE 0.5 MG: 0.5 CAPSULE, LIQUID FILLED ORAL at 10:27

## 2019-01-01 RX ADMIN — PANTOPRAZOLE SODIUM 40 MG: 40 TABLET, DELAYED RELEASE ORAL at 17:01

## 2019-01-01 RX ADMIN — PANTOPRAZOLE SODIUM 40 MG: 40 TABLET, DELAYED RELEASE ORAL at 17:03

## 2019-01-01 RX ADMIN — APIXABAN 2.5 MG: 2.5 TABLET, FILM COATED ORAL at 11:22

## 2019-01-01 RX ADMIN — POTASSIUM CHLORIDE 10 MEQ: 10 INJECTION, SOLUTION INTRAVENOUS at 11:49

## 2019-01-01 RX ADMIN — FENTANYL CITRATE 30 MCG: 50 INJECTION, SOLUTION INTRAMUSCULAR; INTRAVENOUS at 13:52

## 2019-01-01 RX ADMIN — APIXABAN 2.5 MG: 2.5 TABLET, FILM COATED ORAL at 08:26

## 2019-01-01 RX ADMIN — PANTOPRAZOLE SODIUM 40 MG: 40 TABLET, DELAYED RELEASE ORAL at 17:22

## 2019-01-01 RX ADMIN — VITAMIN D, TAB 1000IU (100/BT) 2000 UNITS: 25 TAB at 08:51

## 2019-01-01 RX ADMIN — Medication 10 ML: at 16:42

## 2019-01-01 RX ADMIN — HYDRALAZINE HYDROCHLORIDE 25 MG: 25 TABLET, FILM COATED ORAL at 10:26

## 2019-01-01 RX ADMIN — QUETIAPINE FUMARATE 25 MG: 25 TABLET ORAL at 23:55

## 2019-01-01 RX ADMIN — PROPOFOL 10 MG: 10 INJECTION, EMULSION INTRAVENOUS at 14:02

## 2019-01-01 RX ADMIN — FUROSEMIDE 40 MG: 40 TABLET ORAL at 17:03

## 2019-01-01 RX ADMIN — Medication 100 MG: at 08:57

## 2019-01-01 RX ADMIN — Medication 5 ML: at 06:00

## 2019-01-01 RX ADMIN — PIPERACILLIN AND TAZOBACTAM 3.38 G: 3; .375 INJECTION, POWDER, FOR SOLUTION INTRAVENOUS at 04:11

## 2019-01-01 RX ADMIN — PROPOFOL 20 MG: 10 INJECTION, EMULSION INTRAVENOUS at 14:29

## 2019-01-01 RX ADMIN — SODIUM CHLORIDE 40 MG: 9 INJECTION INTRAMUSCULAR; INTRAVENOUS; SUBCUTANEOUS at 05:15

## 2019-01-01 RX ADMIN — HYDRALAZINE HYDROCHLORIDE 50 MG: 50 TABLET ORAL at 11:22

## 2019-01-01 RX ADMIN — FUROSEMIDE 40 MG: 40 TABLET ORAL at 17:17

## 2019-01-01 RX ADMIN — FERROUS SULFATE TAB 325 MG (65 MG ELEMENTAL FE) 325 MG: 325 (65 FE) TAB at 19:50

## 2019-01-01 RX ADMIN — PROPOFOL 30 MG: 10 INJECTION, EMULSION INTRAVENOUS at 14:00

## 2019-01-01 RX ADMIN — Medication 100 MG: at 09:42

## 2019-01-01 RX ADMIN — FUROSEMIDE 80 MG: 10 INJECTION, SOLUTION INTRAMUSCULAR; INTRAVENOUS at 08:51

## 2019-01-01 RX ADMIN — POTASSIUM CHLORIDE 20 MEQ: 750 TABLET, EXTENDED RELEASE ORAL at 12:04

## 2019-01-01 RX ADMIN — DOXAZOSIN 1 MG: 2 TABLET ORAL at 21:53

## 2019-01-01 RX ADMIN — HYDRALAZINE HYDROCHLORIDE 10 MG: 20 INJECTION INTRAMUSCULAR; INTRAVENOUS at 20:42

## 2019-01-01 RX ADMIN — PIPERACILLIN AND TAZOBACTAM 3.38 G: 3; .375 INJECTION, POWDER, FOR SOLUTION INTRAVENOUS at 05:18

## 2019-01-01 RX ADMIN — PIPERACILLIN AND TAZOBACTAM 3.38 G: 3; .375 INJECTION, POWDER, FOR SOLUTION INTRAVENOUS at 05:05

## 2019-01-01 RX ADMIN — LEVOTHYROXINE SODIUM 175 MCG: 150 TABLET ORAL at 08:26

## 2019-01-01 RX ADMIN — Medication 9 MG: at 22:13

## 2019-01-01 RX ADMIN — Medication 10 ML: at 11:01

## 2019-01-01 RX ADMIN — METOLAZONE 5 MG: 5 TABLET ORAL at 08:51

## 2019-01-01 RX ADMIN — FUROSEMIDE 40 MG: 40 TABLET ORAL at 19:50

## 2019-01-01 RX ADMIN — HYDRALAZINE HYDROCHLORIDE 50 MG: 50 TABLET ORAL at 16:57

## 2019-01-01 RX ADMIN — Medication 10 ML: at 13:11

## 2019-01-01 RX ADMIN — DUTASTERIDE 0.5 MG: 0.5 CAPSULE, LIQUID FILLED ORAL at 08:38

## 2019-01-01 RX ADMIN — DOXAZOSIN 1 MG: 2 TABLET ORAL at 22:18

## 2019-01-01 RX ADMIN — Medication 10 ML: at 07:10

## 2019-01-01 RX ADMIN — DUTASTERIDE 0.5 MG: 0.5 CAPSULE, LIQUID FILLED ORAL at 09:25

## 2019-01-01 RX ADMIN — Medication 100 MG: at 11:01

## 2019-01-01 RX ADMIN — PIPERACILLIN AND TAZOBACTAM 3.38 G: 3; .375 INJECTION, POWDER, FOR SOLUTION INTRAVENOUS at 17:25

## 2019-01-01 RX ADMIN — Medication 10 ML: at 21:09

## 2019-01-01 RX ADMIN — DUTASTERIDE 0.5 MG: 0.5 CAPSULE, LIQUID FILLED ORAL at 08:52

## 2019-01-01 RX ADMIN — DUTASTERIDE 0.5 MG: 0.5 CAPSULE, LIQUID FILLED ORAL at 11:22

## 2019-01-01 RX ADMIN — PANTOPRAZOLE SODIUM 40 MG: 40 TABLET, DELAYED RELEASE ORAL at 10:27

## 2019-01-01 RX ADMIN — Medication 10 ML: at 21:34

## 2019-01-01 RX ADMIN — VERAPAMIL HYDROCHLORIDE 90 MG: 180 TABLET, FILM COATED, EXTENDED RELEASE ORAL at 08:00

## 2019-01-01 RX ADMIN — HYDRALAZINE HYDROCHLORIDE 25 MG: 25 TABLET, FILM COATED ORAL at 18:55

## 2019-01-01 RX ADMIN — HYDRALAZINE HYDROCHLORIDE 25 MG: 25 TABLET, FILM COATED ORAL at 16:20

## 2019-01-01 RX ADMIN — PANTOPRAZOLE SODIUM 40 MG: 40 TABLET, DELAYED RELEASE ORAL at 11:23

## 2019-01-01 RX ADMIN — PROPOFOL 20 MG: 10 INJECTION, EMULSION INTRAVENOUS at 14:08

## 2019-01-01 RX ADMIN — SODIUM CHLORIDE 40 MG: 9 INJECTION INTRAMUSCULAR; INTRAVENOUS; SUBCUTANEOUS at 16:18

## 2019-01-01 RX ADMIN — APIXABAN 2.5 MG: 2.5 TABLET, FILM COATED ORAL at 09:42

## 2019-01-01 RX ADMIN — Medication 10 ML: at 21:00

## 2019-01-01 RX ADMIN — PROPOFOL 10 MG: 10 INJECTION, EMULSION INTRAVENOUS at 14:04

## 2019-01-01 RX ADMIN — Medication 100 MG: at 10:26

## 2019-01-01 RX ADMIN — PIPERACILLIN AND TAZOBACTAM 3.38 G: 3; .375 INJECTION, POWDER, FOR SOLUTION INTRAVENOUS at 04:34

## 2019-01-01 RX ADMIN — Medication 9 MG: at 05:15

## 2019-01-01 RX ADMIN — SODIUM CHLORIDE 40 MG: 9 INJECTION INTRAMUSCULAR; INTRAVENOUS; SUBCUTANEOUS at 15:22

## 2019-01-01 RX ADMIN — Medication 10 ML: at 03:42

## 2019-01-01 RX ADMIN — Medication 9 MG: at 21:08

## 2019-01-01 RX ADMIN — VITAMIN D, TAB 1000IU (100/BT) 2000 UNITS: 25 TAB at 08:26

## 2019-01-01 RX ADMIN — FERROUS SULFATE TAB 325 MG (65 MG ELEMENTAL FE) 325 MG: 325 (65 FE) TAB at 08:57

## 2019-01-01 RX ADMIN — HYDRALAZINE HYDROCHLORIDE 10 MG: 20 INJECTION INTRAMUSCULAR; INTRAVENOUS at 05:14

## 2019-01-01 RX ADMIN — LISINOPRIL 20 MG: 20 TABLET ORAL at 08:26

## 2019-01-01 RX ADMIN — DUTASTERIDE 0.5 MG: 0.5 CAPSULE, LIQUID FILLED ORAL at 11:16

## 2019-01-01 RX ADMIN — PROPOFOL 30 MG: 10 INJECTION, EMULSION INTRAVENOUS at 13:57

## 2019-01-01 RX ADMIN — APIXABAN 2.5 MG: 2.5 TABLET, FILM COATED ORAL at 20:41

## 2019-01-01 RX ADMIN — LEVOTHYROXINE SODIUM 175 MCG: 150 TABLET ORAL at 07:21

## 2019-01-01 RX ADMIN — Medication 9 MG: at 22:07

## 2019-01-01 RX ADMIN — PANTOPRAZOLE SODIUM 40 MG: 40 TABLET, DELAYED RELEASE ORAL at 11:01

## 2019-01-01 RX ADMIN — DOXAZOSIN 1 MG: 2 TABLET ORAL at 22:13

## 2019-01-01 RX ADMIN — Medication 100 MG: at 09:11

## 2019-01-01 RX ADMIN — HYDRALAZINE HYDROCHLORIDE 25 MG: 25 TABLET, FILM COATED ORAL at 22:13

## 2019-01-01 RX ADMIN — DIPHENHYDRAMINE HYDROCHLORIDE 25 MG: 25 CAPSULE ORAL at 21:05

## 2019-01-01 RX ADMIN — POTASSIUM CHLORIDE 40 MEQ: 750 TABLET, EXTENDED RELEASE ORAL at 09:10

## 2019-01-01 RX ADMIN — FERROUS SULFATE TAB 325 MG (65 MG ELEMENTAL FE) 325 MG: 325 (65 FE) TAB at 17:08

## 2019-01-01 RX ADMIN — SODIUM CHLORIDE 40 MG: 9 INJECTION INTRAMUSCULAR; INTRAVENOUS; SUBCUTANEOUS at 16:16

## 2019-01-01 RX ADMIN — ALBUMIN (HUMAN) 12.5 G: 0.25 INJECTION, SOLUTION INTRAVENOUS at 23:32

## 2019-01-01 RX ADMIN — FUROSEMIDE 80 MG: 10 INJECTION, SOLUTION INTRAMUSCULAR; INTRAVENOUS at 08:58

## 2019-01-01 RX ADMIN — FERROUS SULFATE TAB 325 MG (65 MG ELEMENTAL FE) 325 MG: 325 (65 FE) TAB at 19:06

## 2019-01-01 RX ADMIN — DIPHENHYDRAMINE HYDROCHLORIDE 25 MG: 25 CAPSULE ORAL at 21:13

## 2019-01-01 RX ADMIN — PIPERACILLIN AND TAZOBACTAM 3.38 G: 3; .375 INJECTION, POWDER, FOR SOLUTION INTRAVENOUS at 04:49

## 2019-01-01 RX ADMIN — PROPOFOL 20 MG: 10 INJECTION, EMULSION INTRAVENOUS at 14:10

## 2019-01-01 RX ADMIN — FERROUS SULFATE TAB 325 MG (65 MG ELEMENTAL FE) 325 MG: 325 (65 FE) TAB at 18:38

## 2019-01-01 RX ADMIN — SODIUM CHLORIDE 40 MG: 9 INJECTION INTRAMUSCULAR; INTRAVENOUS; SUBCUTANEOUS at 06:10

## 2019-01-01 RX ADMIN — Medication 100 MG: at 08:26

## 2019-01-01 RX ADMIN — LEVOTHYROXINE SODIUM 175 MCG: 150 TABLET ORAL at 07:02

## 2019-01-01 RX ADMIN — FUROSEMIDE 40 MG: 40 TABLET ORAL at 09:30

## 2019-01-01 RX ADMIN — DEXTROSE MONOHYDRATE AND SODIUM CHLORIDE 100 ML/HR: 5; .9 INJECTION, SOLUTION INTRAVENOUS at 01:17

## 2019-01-01 RX ADMIN — Medication 10 ML: at 21:13

## 2019-01-01 RX ADMIN — Medication 10 ML: at 15:54

## 2019-01-01 RX ADMIN — Medication 10 ML: at 07:59

## 2019-01-01 RX ADMIN — LEVOTHYROXINE SODIUM 175 MCG: 150 TABLET ORAL at 07:10

## 2019-01-01 RX ADMIN — Medication 10 ML: at 14:18

## 2019-01-01 RX ADMIN — HYDRALAZINE HYDROCHLORIDE 25 MG: 25 TABLET, FILM COATED ORAL at 17:03

## 2019-01-01 RX ADMIN — FERROUS SULFATE TAB 325 MG (65 MG ELEMENTAL FE) 325 MG: 325 (65 FE) TAB at 08:26

## 2019-01-01 RX ADMIN — PIPERACILLIN AND TAZOBACTAM 3.38 G: 3; .375 INJECTION, POWDER, FOR SOLUTION INTRAVENOUS at 16:42

## 2019-01-01 RX ADMIN — DUTASTERIDE 0.5 MG: 0.5 CAPSULE, LIQUID FILLED ORAL at 09:11

## 2019-01-01 RX ADMIN — PANTOPRAZOLE SODIUM 40 MG: 40 TABLET, DELAYED RELEASE ORAL at 11:57

## 2019-01-01 RX ADMIN — FERROUS SULFATE TAB 325 MG (65 MG ELEMENTAL FE) 325 MG: 325 (65 FE) TAB at 09:25

## 2019-01-01 RX ADMIN — Medication 9 MG: at 21:53

## 2019-01-01 RX ADMIN — FERROUS SULFATE TAB 325 MG (65 MG ELEMENTAL FE) 325 MG: 325 (65 FE) TAB at 17:03

## 2019-01-01 RX ADMIN — HYDRALAZINE HYDROCHLORIDE 10 MG: 20 INJECTION INTRAMUSCULAR; INTRAVENOUS at 23:42

## 2019-01-01 RX ADMIN — HYDRALAZINE HYDROCHLORIDE 25 MG: 25 TABLET, FILM COATED ORAL at 17:08

## 2019-01-01 RX ADMIN — Medication 10 ML: at 05:18

## 2019-01-01 RX ADMIN — MAGNESIUM SULFATE HEPTAHYDRATE 1 G: 1 INJECTION, SOLUTION INTRAVENOUS at 09:11

## 2019-01-01 RX ADMIN — LISINOPRIL 20 MG: 20 TABLET ORAL at 08:38

## 2019-01-01 RX ADMIN — POTASSIUM CHLORIDE 10 MEQ: 10 INJECTION, SOLUTION INTRAVENOUS at 12:30

## 2019-01-01 RX ADMIN — PANTOPRAZOLE SODIUM 40 MG: 40 TABLET, DELAYED RELEASE ORAL at 18:55

## 2019-01-01 RX ADMIN — Medication 10 ML: at 07:19

## 2019-01-01 RX ADMIN — FUROSEMIDE 40 MG: 40 TABLET ORAL at 09:23

## 2019-01-01 RX ADMIN — DUTASTERIDE 0.5 MG: 0.5 CAPSULE, LIQUID FILLED ORAL at 08:26

## 2019-01-01 RX ADMIN — FUROSEMIDE 40 MG: 40 TABLET ORAL at 08:39

## 2019-01-01 RX ADMIN — HYDRALAZINE HYDROCHLORIDE 25 MG: 25 TABLET, FILM COATED ORAL at 22:18

## 2019-01-01 RX ADMIN — VERAPAMIL HYDROCHLORIDE 90 MG: 180 TABLET, FILM COATED, EXTENDED RELEASE ORAL at 11:57

## 2019-01-01 RX ADMIN — POTASSIUM CHLORIDE 10 MEQ: 10 INJECTION, SOLUTION INTRAVENOUS at 10:26

## 2019-01-01 RX ADMIN — PIPERACILLIN AND TAZOBACTAM 3.38 G: 3; .375 INJECTION, POWDER, FOR SOLUTION INTRAVENOUS at 18:55

## 2019-01-01 RX ADMIN — CLONIDINE HYDROCHLORIDE 0.1 MG: 0.1 TABLET ORAL at 00:47

## 2019-01-01 RX ADMIN — SODIUM CHLORIDE: 9 INJECTION, SOLUTION INTRAVENOUS at 14:21

## 2019-01-01 RX ADMIN — FUROSEMIDE 80 MG: 10 INJECTION, SOLUTION INTRAMUSCULAR; INTRAVENOUS at 17:08

## 2019-01-01 RX ADMIN — Medication 10 ML: at 14:51

## 2019-01-01 RX ADMIN — HYDRALAZINE HYDROCHLORIDE 10 MG: 20 INJECTION INTRAMUSCULAR; INTRAVENOUS at 22:31

## 2019-01-01 RX ADMIN — LEVOTHYROXINE SODIUM 175 MCG: 150 TABLET ORAL at 07:30

## 2019-01-01 RX ADMIN — POTASSIUM CHLORIDE 20 MEQ: 750 TABLET, EXTENDED RELEASE ORAL at 08:57

## 2019-01-01 RX ADMIN — HYDRALAZINE HYDROCHLORIDE 12.5 MG: 25 TABLET, FILM COATED ORAL at 08:52

## 2019-01-01 RX ADMIN — PANTOPRAZOLE SODIUM 40 MG: 40 TABLET, DELAYED RELEASE ORAL at 18:22

## 2019-01-01 RX ADMIN — VERAPAMIL HYDROCHLORIDE 180 MG: 180 TABLET, FILM COATED, EXTENDED RELEASE ORAL at 08:56

## 2019-01-01 RX ADMIN — PROPOFOL 10 MG: 10 INJECTION, EMULSION INTRAVENOUS at 14:06

## 2019-01-01 RX ADMIN — Medication 10 ML: at 14:02

## 2019-01-01 RX ADMIN — SODIUM CHLORIDE 40 MG: 9 INJECTION INTRAMUSCULAR; INTRAVENOUS; SUBCUTANEOUS at 03:41

## 2019-01-01 RX ADMIN — PROPOFOL 50 MG: 10 INJECTION, EMULSION INTRAVENOUS at 14:31

## 2019-01-01 RX ADMIN — Medication 100 MG: at 08:53

## 2019-01-01 RX ADMIN — DUTASTERIDE 0.5 MG: 0.5 CAPSULE, LIQUID FILLED ORAL at 08:57

## 2019-01-01 RX ADMIN — LISINOPRIL 20 MG: 20 TABLET ORAL at 08:57

## 2019-01-01 RX ADMIN — PROPOFOL 80 MG: 10 INJECTION, EMULSION INTRAVENOUS at 14:26

## 2019-01-01 RX ADMIN — FERROUS SULFATE TAB 325 MG (65 MG ELEMENTAL FE) 325 MG: 325 (65 FE) TAB at 09:11

## 2019-01-01 RX ADMIN — FERROUS SULFATE TAB 325 MG (65 MG ELEMENTAL FE) 325 MG: 325 (65 FE) TAB at 17:17

## 2019-01-01 RX ADMIN — Medication 10 ML: at 14:34

## 2019-01-01 RX ADMIN — Medication 100 MG: at 08:36

## 2019-01-01 RX ADMIN — FUROSEMIDE 40 MG: 40 TABLET ORAL at 17:47

## 2019-01-01 RX ADMIN — HYDRALAZINE HYDROCHLORIDE 10 MG: 20 INJECTION INTRAMUSCULAR; INTRAVENOUS at 03:43

## 2019-01-01 RX ADMIN — APIXABAN 2.5 MG: 2.5 TABLET, FILM COATED ORAL at 22:34

## 2019-01-01 RX ADMIN — FERROUS SULFATE TAB 325 MG (65 MG ELEMENTAL FE) 325 MG: 325 (65 FE) TAB at 17:47

## 2019-01-01 RX ADMIN — LIDOCAINE HYDROCHLORIDE 40 MG: 20 INJECTION, SOLUTION EPIDURAL; INFILTRATION; INTRACAUDAL; PERINEURAL at 13:52

## 2019-01-01 RX ADMIN — SODIUM CHLORIDE 40 MG: 9 INJECTION INTRAMUSCULAR; INTRAVENOUS; SUBCUTANEOUS at 15:54

## 2019-01-01 RX ADMIN — ALBUMIN (HUMAN) 12.5 G: 0.25 INJECTION, SOLUTION INTRAVENOUS at 17:29

## 2019-01-01 RX ADMIN — DOXAZOSIN 1 MG: 1 TABLET ORAL at 09:23

## 2019-01-01 RX ADMIN — HYDRALAZINE HYDROCHLORIDE 25 MG: 25 TABLET, FILM COATED ORAL at 15:54

## 2019-01-01 RX ADMIN — PANTOPRAZOLE SODIUM 40 MG: 40 TABLET, DELAYED RELEASE ORAL at 09:42

## 2019-01-01 RX ADMIN — HYDRALAZINE HYDROCHLORIDE 25 MG: 25 TABLET, FILM COATED ORAL at 17:17

## 2019-01-01 RX ADMIN — HYDRALAZINE HYDROCHLORIDE 50 MG: 50 TABLET ORAL at 09:42

## 2019-01-01 RX ADMIN — POTASSIUM CHLORIDE 40 MEQ: 750 TABLET, EXTENDED RELEASE ORAL at 08:37

## 2019-01-01 RX ADMIN — HYDRALAZINE HYDROCHLORIDE 25 MG: 25 TABLET, FILM COATED ORAL at 21:08

## 2019-01-01 RX ADMIN — Medication 10 ML: at 11:24

## 2019-01-01 RX ADMIN — POTASSIUM CHLORIDE 40 MEQ: 750 TABLET, FILM COATED, EXTENDED RELEASE ORAL at 11:32

## 2019-01-01 RX ADMIN — SODIUM CHLORIDE 40 MG: 9 INJECTION INTRAMUSCULAR; INTRAVENOUS; SUBCUTANEOUS at 14:34

## 2019-01-01 RX ADMIN — FUROSEMIDE 40 MG: 10 INJECTION, SOLUTION INTRAMUSCULAR; INTRAVENOUS at 11:00

## 2019-01-01 RX ADMIN — LISINOPRIL 20 MG: 20 TABLET ORAL at 09:11

## 2019-01-01 RX ADMIN — PIPERACILLIN AND TAZOBACTAM 3.38 G: 3; .375 INJECTION, POWDER, FOR SOLUTION INTRAVENOUS at 16:57

## 2019-01-01 RX ADMIN — FERROUS SULFATE TAB 325 MG (65 MG ELEMENTAL FE) 325 MG: 325 (65 FE) TAB at 08:36

## 2019-07-25 PROBLEM — I50.23 ACUTE ON CHRONIC SYSTOLIC CHF (CONGESTIVE HEART FAILURE) (HCC): Status: ACTIVE | Noted: 2019-01-01

## 2019-07-25 NOTE — ED TRIAGE NOTES
Triage Note: Patient is coming in from [facility] for shortness of breath and leg swelling for 1-2 weeks. Patient had a syncopal episode yesterday. Increased shortness of breath with ambulation.

## 2019-07-25 NOTE — ED PROVIDER NOTES
80 y.o. male with past medical history significant for HTN, colon CA, A-fib, and skin CA who presents from [facility] via EMS with chief complaint of SOB. Pt reports onset \"2 weeks ago\" of SOB and bilateral leg swelling. Pt reports SOB is neither worsening nor improving, but is exacerbated with exertion. Pt reports his doctor adjusted his medications and pt's family states pt is on his \"2nd day\" of a \"new diuretic\". Pt reports noticing an increase in urination since medication change. Pt also notes non productive cough. According to the triage note, pt experienced syncopal episode \"yesterday\". Pt denies any lightheadedness, dizziness, chest pain, fevers, chills or HA. There are no other acute medical concerns at this time. Social hx: Former smoker (quit date: 7/2/1983; 0.5 pcks/day for 32 yrs); EtOH use    PCP: Long Laguna MD  Cardiologist: Gwen Dickson MD    Note written by Evangelista Martin, as dictated by Joseph Lynch MD 5:37 PM      The history is provided by the patient and a relative. No  was used.         Past Medical History:   Diagnosis Date    Arrhythmia     irrigular heart beat- A FIB    Cancer ([facility] [address] 2013    COLON     Cancer [facility])     SKIN    Colon cancer ([facility] [address] 7/1/2013    Hypertension        Past Surgical History:   Procedure Laterality Date    HX OTHER SURGICAL      several basal cell removals & cyst removed from chest    HX TONSIL AND ADENOIDECTOMY      AS CHILD         Family History:   Problem Relation Age of Onset    Cancer Mother         colon    Cancer Father         lung    Cancer Brother         UNKNOWN       Social History     Socioeconomic History    Marital status:      Spouse name: Not on file    Number of children: Not on file    Years of education: Not on file    Highest education level: Not on file   Occupational History    Not on file   Social Needs    Financial resource strain: Not on file    Food insecurity:     Worry: Not on file     Inability: Not on file    Transportation needs:     Medical: Not on file     Non-medical: Not on file   Tobacco Use    Smoking status: Former Smoker     Packs/day: 0.50     Years: 32.00     Pack years: 16.00     Types: Cigarettes     Last attempt to quit: 1983     Years since quittin.0    Smokeless tobacco: Never Used   Substance and Sexual Activity    Alcohol use: Yes     Comment: edie/gin 7 per week    Drug use: No    Sexual activity: Not on file   Lifestyle    Physical activity:     Days per week: Not on file     Minutes per session: Not on file    Stress: Not on file   Relationships    Social connections:     Talks on phone: Not on file     Gets together: Not on file     Attends [employer] service: Not on file     Active member of club or organization: Not on file     Attends meetings of clubs or organizations: Not on file     Relationship status: Not on file    Intimate partner violence:     Fear of current or ex partner: Not on file     Emotionally abused: Not on file     Physically abused: Not on file     Forced sexual activity: Not on file   Other Topics Concern    Not on file   Social History Narrative    Not on file         ALLERGIES: Patient has no known allergies. Review of Systems   Constitutional: Negative for chills and fever. Respiratory: Positive for cough and shortness of breath. Cardiovascular: Positive for leg swelling. Negative for chest pain. Gastrointestinal: Negative for abdominal pain, constipation, diarrhea and vomiting. Neurological: Negative for dizziness, light-headedness and headaches. All other systems reviewed and are negative. Vitals:    19 1737   BP: 184/78   Pulse: (!) 54   Resp: 16   Temp: 97.4 °F (36.3 °C)   SpO2: 98%   Height: 6' 1.5\" (1.867 m)            Physical Exam   Constitutional: He appears well-developed and well-nourished. No distress. HENT:   Head: Normocephalic and atraumatic.    Eyes: Pupils are equal, round, and reactive to light. Conjunctivae are normal.   Neck: Normal range of motion. Cardiovascular: Normal rate, regular rhythm and normal heart sounds. Pulmonary/Chest: Effort normal and breath sounds normal. No respiratory distress. He has no decreased breath sounds. Abdominal: Soft. He exhibits no distension. There is no tenderness. Genitourinary: Rectum normal.   Musculoskeletal: Normal range of motion. He exhibits edema. Trace peripheral edema noted. Neurological: He is alert. Skin: Skin is dry. Capillary refill takes less than 2 seconds. Nursing note and vitals reviewed. Note written by Evangelista Jasso, as dictated by Axel Macias MD 5:37 PM    MDM  Number of Diagnoses or Management Options  Acute on chronic congestive heart failure, unspecified heart failure type [facility]): established and worsening  Anemia, unspecified type: new and requires workup  Dyspnea on exertion: established and worsening  Occult blood in stools: new and requires workup  Diagnosis management comments: DDX: acute myocardial infarction, significant arrhythmia, unstable angina, esophageal perforation, pulmonary embolism, aortic dissection, pneumothorax, severe pneumonia, sepsis, PNA, pericarditis, CHF, anemia         Procedures  ED EKG interpretation: 1743  Rhythm: junctional rhythm; Rate (approx.): 58 bpm; Axis: left axis deviation; No P waves identified; No STEMI. Note written by Evangelista Jasso, as dictated by Axel Macias MD 5:47 PM    CONSULT NOTE:  6:04 PM Axel Macias MD spoke with Dr. Stacy Bar, Consult for Cardiology. Discussed available diagnostic tests and clinical findings. Dr. Stacy Bar will see and evaluate the pt. PROGRESS NOTE:  7:04 PM  TigerText to hospitalist     Patient is being admitted to the hospital.  The results of their tests and reasons for their admission have been discussed with them and/or available family.   They convey agreement and understanding for the need to be admitted and for their admission diagnosis. Consultation will be made now with the inpatient physician for hospitalization. Hospitalist Alex for Admission  7:05 PM    ED Room Number: ER22/22  Patient Name and age:  Luis Trujillo 80 y.o.  male  Working Diagnosis:   1. Dyspnea on exertion    2. Acute on chronic congestive heart failure, unspecified heart failure type ([address]    3. Anemia, unspecified type    4.  Occult blood in stools      Readmission: no  Isolation Requirements:  no  Recommended Level of Care:  telemetry  Code Status:  Do Not Resuscitate  Other:  Failed outpatient diuresis  Department:[facility] Adult ED - 21

## 2019-07-26 NOTE — CONSULTS
[address] [facility].   [address] [facility] [address]        GASTROENTEROLOGY CONSULTATION NOTE  Will Geovany Console  [phone] office  [phone] NP/PA in-hospital cell phone M-F until 4:30PM  After 5PM or on weekends, please call  for physician on call        NAME:  Marquita Saucedo   :   1926   MRN:   [id number]       Referring Physician: Dr. Roly Cordova Date: 2019 1:03 PM    Chief Complaint: shortness of breath     History of Present Illness:  Patient is a 80 y.o. who is seen in consultation at the request of Dr. Delma Marcelo for GI bleed. Patient has a past medical history significant for hypertension, hypothyroidism, atrial fibrillation, colon cancer, skin cancer, and chronic kidney disease. He presented to the ED with complaints of shortness of breath. Patient was admitted to the hospital on 19. Patient reports that he was seen by his primary care physician approximately 2 weeks ago and found to be anemic. At that time, he was started on oral iron supplementation. Given history of daily NSAID use x 2 months, patient was also started on omeprazole 20 mg twice daily by his primary care physician. He has since discontinued NSAID use. Since starting the iron, patient reports black stools. No hematochezia. He has approximately 1 bowel movement daily that is formed and easy to pass. No bowel movement for the last 2 days. Denies nausea, reflux, vomiting, or abdominal pain. No fevers, chills, or unexplained weight loss. History of NSAID use (Advil daily x 2 months). Patient is on Eliquis. + Alcohol use (2 glasses of wine/day). No tobacco use. History of transverse colon cancer and large ascending colon polyp for which he underwent laparoscopic extended right colectomy by Dr. Scott Adames on 13. Patient was last seen in our office by Dr. Jalil Tompkins in 2018.   Per review of office notes, patient had a reported follow up colonoscopy in  with subcentimeter tubular adenoma. Dr. Iona Segovia did not recommend further colonoscopies given advanced age. CT scan and CEA were reportedly unremarkable in 2017. EGD (2011) by Dr. Tam Russell for anemia showed a hiatal hernia; no findings to explain anemia. I have reviewed the emergency room note, hospital admission note, notes by all other clinicians who have seen the patient during this hospitalization to date. I have reviewed the problem list and the reason for this hospitalization. I have reviewed the allergies and the medications the patient was taking at home prior to this hospitalization. PMH:  Past Medical History:   Diagnosis Date    Arrhythmia     irrigular heart beat- A FIB    Cancer ([facility] [address] 2013    COLON     Cancer [facility])     SKIN    Colon cancer ([facility] [address] 7/1/2013    Hypertension        PSH:  Past Surgical History:   Procedure Laterality Date    HX OTHER SURGICAL      several basal cell removals & cyst removed from chest    HX TONSIL AND ADENOIDECTOMY      AS CHILD       Allergies:  No Known Allergies    Home Medications:  Prior to Admission Medications   Prescriptions Last Dose Informant Patient Reported? Taking? apixaban (ELIQUIS) 2.5 mg tablet 7/25/2019 at Unknown time  Yes Yes   Sig: Take 2.5 mg by mouth two (2) times a day. cholecalciferol, vitamin D3, (VITAMIN D3) 2,000 unit tab 7/25/2019 at Unknown time  Yes Yes   Sig: Take 1 Tab by mouth daily. diphenhydrAMINE (BENADRYL ALLERGY) 25 mg tablet   Yes Yes   Sig: Take 25 mg by mouth nightly as needed for Sleep. dutasteride (AVODART) 0.5 mg capsule 7/25/2019 at Unknown time  Yes Yes   Sig: Take 0.5 mg by mouth daily. ferrous sulfate 325 mg (65 mg iron) tablet 7/25/2019 at Unknown time  Yes Yes   Sig: Take 325 mg by mouth two (2) times a day. furosemide (LASIX) 80 mg tablet 7/25/2019 at Unknown time  Yes Yes   Sig: Take 80 mg by mouth daily.    hydrALAZINE (APRESOLINE) 25 mg tablet 7/25/2019 at Unknown time  Yes Yes   Sig: Take 12.5 mg by mouth three (3) times daily. levothyroxine (SYNTHROID) 175 mcg tablet 7/25/2019 at Unknown time  Yes Yes   Sig: Take 175 mcg by mouth Daily (before breakfast). lisinopril (PRINIVIL, ZESTRIL) 40 mg tablet 7/25/2019 at Unknown time  Yes Yes   Sig: Take 40 mg by mouth daily. melatonin 10 mg tab 7/24/2019 at Unknown time  Yes Yes   Sig: Take 1 Tab by mouth nightly. metOLazone (ZAROXOLYN) 5 mg tablet 7/25/2019 at Unknown time  Yes Yes   Sig: Take 5 mg by mouth daily. omeprazole (PRILOSEC) 20 mg capsule 7/25/2019 at Unknown time  Yes Yes   Sig: Take 20 mg by mouth two (2) times a day. terazosin (HYTRIN) 10 mg capsule 7/24/2019 at Unknown time  Yes Yes   Sig: Take 10 mg by mouth daily. Indications: high blood pressure   thiamine HCL (B-1) 100 mg tablet 7/25/2019 at Unknown time  Yes Yes   Sig: Take 100 mg by mouth daily. triamcinolone acetonide (KENALOG) 0.1 % topical cream   Yes Yes   Sig: Apply  to affected area two (2) times daily as needed for Skin Irritation or Itching. use thin layer   verapamil ER (CALAN-SR) 180 mg CR tablet 7/24/2019 at Unknown time  Yes Yes   Sig: Take 180 mg by mouth daily.       Facility-Administered Medications: None       Hospital Medications:  Current Facility-Administered Medications   Medication Dose Route Frequency    cholecalciferol (VITAMIN D3) tablet 2,000 Units  2,000 Units Oral DAILY    diphenhydrAMINE (BENADRYL) capsule 25 mg  25 mg Oral QHS PRN    dutasteride (AVODART) capsule 0.5 mg  0.5 mg Oral DAILY    ferrous sulfate tablet 325 mg  325 mg Oral BID    furosemide (LASIX) injection 80 mg  80 mg IntraVENous DAILY    levothyroxine (SYNTHROID) tablet 175 mcg  175 mcg Oral ACB    melatonin tablet 9 mg  9 mg Oral QHS    metOLazone (ZAROXOLYN) tablet 5 mg  5 mg Oral DAILY    pantoprazole (PROTONIX) 40 mg in sodium chloride 0.9% 10 mL injection  40 mg IntraVENous Q12H    thiamine HCL (B-1) tablet 100 mg  100 mg Oral DAILY    sodium chloride (NS) flush 5-40 mL 5-40 mL IntraVENous Q8H    sodium chloride (NS) flush 5-40 mL  5-40 mL IntraVENous PRN    acetaminophen (TYLENOL) tablet 650 mg  650 mg Oral Q4H PRN    ondansetron (ZOFRAN) injection 4 mg  4 mg IntraVENous Q4H PRN    bisacodyl (DULCOLAX) tablet 5 mg  5 mg Oral DAILY PRN    hydrALAZINE (APRESOLINE) 20 mg/mL injection 10 mg  10 mg IntraVENous Q6H PRN    hydrALAZINE (APRESOLINE) tablet 25 mg  25 mg Oral TID       Social History:  Social History     Tobacco Use    Smoking status: Former Smoker     Packs/day: 0.50     Years: 32.00     Pack years: 16.00     Types: Cigarettes     Last attempt to quit: 1983     Years since quittin.0    Smokeless tobacco: Never Used   Substance Use Topics    Alcohol use: Yes     Comment: edie/gin 7 per week       Family History:  Family History   Problem Relation Age of Onset    Cancer Mother         colon    Cancer Father         lung    Cancer Brother         UNKNOWN       Review of Systems:  Constitutional: negative fever, negative chills, negative weight loss  Eyes:   negative visual changes  ENT:   negative sore throat, tongue or lip swelling  Respiratory:  + cough, + dyspnea  Cards:  negative for chest pain, palpitations, lower extremity edema  GI:   See HPI  :  negative for frequency, dysuria  Integument:  negative for rash and pruritus  Heme:  + for easy bruising, negative gum/nose bleeding  Musculoskeletal:negative for myalgias, back pain and muscle weakness  Neuro:  negative for headaches, dizziness  Psych: negative for feelings of anxiety, depression     Objective:     Patient Vitals for the past 8 hrs:   BP Temp Pulse Resp SpO2   19 1113 149/72 97.4 °F (36.3 °C) (!) 50 18 95 %   19 0840 173/76 97.4 °F (36.3 °C) 68 18 95 %   19 0659 186/74  70        07 -  1900  In: 240 [P.O.:240]  Out: 1275 [Urine:1275]  1901 -  07  In: -   Out: 850 [Urine:850]    EXAM:     CONST:  Pleasant male sitting on the edge of the bed, no acute distress, daughter is at the bedside   NEURO:  Alert and oriented x 3   HEENT: EOMI, no scleral icterus   LUNGS: Decreased bilaterally with rales   CARD:  S1 S2   ABD:  Soft, non distended, no tenderness, no rebound, no guarding. + Bowel sounds. EXT:  Warm   PSYCH: Not anxious or agitated     Data Review     Recent Labs     07/26/19  1216 07/26/19  0603  07/25/19  1735   WBC  --  4.8  --  4.9   HGB 8.5* 7.9*   < > 7.9*   HCT 27.6* 26.3*   < > 25.9*   PLT  --  154  --  148*    < > = values in this interval not displayed. Recent Labs     07/26/19  0603 07/25/19  1735    142   K 3.4* 3.5    106   CO2 30 30   BUN 41* 43*   CREA 1.35* 1.51*   * 139*   PHOS 3.4  --    CA 8.9 8.6     Recent Labs     07/26/19  0603   SGOT 37   AP 69   TP 6.4   ALB 3.2*   GLOB 3.2     No results for input(s): INR, PTP, APTT in the last 72 hours. No lab exists for component: INREXT       Assessment:   · Anemia with hemoccult positive stool: Hgb 8.5 (7.9 on admission), platelets 736. Iron low, ferritin normal. Eliquis on hold. On iron. History of NSAID use. · Acute on chronic systolic congestive heart failure: cardiology following. Echo scheduled for today. · Atrial fibrillation: on Eliquis (on hold). · Chronic kidney disease: nephrology consulted. · Hypertension  · Hypothyroidism     Patient Active Problem List   Diagnosis Code    [facility] C18.9    Acute on chronic systolic CHF (congestive heart failure) (HCC) I50.23     Plan:   · PPI BID  · On oral iron supplementation  · Trend CBC and transfuse as necessary  · Avoid NSAIDs  · Next consider EGD. Risks of the procedure to include (but not limited to) anesthesia, bleeding, infection, and perforation were discussed with the patient and his daughter.    · Cardiology following  · Patient was discussed with and will be seen by Dr. Trice Villarreal  · Thank you for allowing me to participate in care of Adrienne Villagran     Signed By: Homero Gamez, PA     7/26/2019  1:03 PM       Gastroenterology Attending Physician attestation statement and comments. This patient was seen and examined by me in a face-to-face visit today. I reviewed the medical record including lab work, imaging and other provider notes. I confirmed the history as described above. I spoke to the patient, reviewed the medical record including lab work, imaging and other provider notes. I discussed this case in detail with Chanel Orantes, [id number]. I formulated an  assessment of this patient and developed a treatment plan. I agree with the above consultation note. I agree with the history, exam and assessment and plan as outlined in the note. I would like to add the following: We will await completion of cardiology and nephrology evaluation. Next step to consider from GI standpoint would be EGD. We can consider this early next week. Monitor CBC. Advance diet as tolerated. Will see on request this weekend. I have discussed plan from our perspective with patient and family at bedside. Richard Zurita MD

## 2019-07-26 NOTE — PROGRESS NOTES
NUTRITION COMPLETE ASSESSMENT    RECOMMENDATIONS:   1. Diet advancement per GI - if no plans for EGD tomorrow can pt have solids for dinner?   - please add Ensure Enlive BID if advanced to at least full liquids  2. Add daily MVI if not drinking Ensure   3. Daily weights     Interventions/Plan:   Food/Nutrient Delivery:    Commercial supplement(see below)        Nutrition Education:    see below    Assessment:   Reason for Assessment: [x]BPA/MST Referral     Diet:  clear liquids  Supplements: none  Nutritionally Significant Medications: [x] Reviewed & Includes: vitamin D, iron, lasix, synthroid, protonix, KCl, thiamine  Meal Intake:   Patient Vitals for the past 100 hrs:   % Diet Eaten   07/26/19 0949 100 %     Subjective:  I had lost weight but I have gained some of it back. Objective:  Pt admitted from [address] [place] [address] for SOB. PMHx: afib, colon CA s/p colectomy, skin CA, CHF, HTN, CKD. Acute CHF with Pulmonary edema with LE swelling on admit. Lasix rx. Anemia on admit. Supplementation for now with possible need for EGD per GI notes. Pt, wife, and daughter visited today. Tired of clears and anxious for diet advancement. Notes good appetite in healthcare [facility] of [facility]. Usually eating all of trays. Notes wt loss earlier this year but has regained some of it. Discussed ways to increase PO intake and supplements. Pt agreeable to trial of Ensure shakes. For now will add Ensure Clear TID (720kcal, 24g protein) since on clear liquids. Once on full liquids please switch to Ensure Enlive BID (700kcal, 40g protein). No recent wt hx available. Wt Readings from Last 10 Encounters:   07/26/19 75 kg (165 lb 5.5 oz)   07/15/13 91.4 kg (201 lb 8 oz)   07/02/13 85.3 kg (188 lb)   07/01/13 88.5 kg (195 lb)     Estimated Nutrition Needs:   Kcals/day: 1055 Kcals/day(1851-1994kcal)  Protein: 90 g(90-105g (1.2-1.4g/kg))  Fluid: 1900 ml(1ml'/kcal)  Based On:  [address](x 1.3-1.4)  Weight Used: Actual wt(75kg)    Nutrition Diagnosis:   1. (Increased protein/energy needs) related to underweight as evidenced by 92% IBW; muscle wasting    Goals:     Diet advancement to at least full liquids with supplements in 1-2 days; consumption of at least 75% meals and 1-2 supplements/day in 5-7 days     Monitoring & Evaluation:    - Total energy intake, Liquid meal replacement, Protein intake   - Weight/weight change, GI profile    Previous Nutrition Goals Met: N/A  Previous Recommendations: N/A    Education & Discharge Needs:   [] None Identified   [x] Identified and addressed    [] Participated in care plan, discharge planning, and/or interdisciplinary rounds        Cultural, Yarsani and ethnic food preferences identified:   None    Skin Integrity: [x]Intact  []Other  Edema: [x]None []Other  Last BM: PTA  Food Allergies: [x]None []Other    Anthropometrics:    Weight Loss Metrics 7/26/2019 7/15/2013 7/9/2013 7/2/2013 7/1/2013   Today's Wt 165 lb 5.5 oz 201 lb 8 oz - 188 lb 195 lb   BMI 22.42 kg/m2 - 26.59 kg/m2 24.81 kg/m2 25.03 kg/m2      Last 3 Recorded Weights in this Encounter    07/25/19 1737 07/26/19 0408 07/26/19 1408   Weight: 77.7 kg (171 lb 4.8 oz) 75.7 kg (166 lb 14.2 oz) 75 kg (165 lb 5.5 oz)      Weight Source: Standing scale (comment)  Height: 6' (182.9 cm),    Body mass index is 22.42 kg/m².      IBW : 80.7 kg (178 lb), % IBW (Calculated): 93.76 %   ,      Labs:    Lab Results   Component Value Date/Time    Sodium 141 07/26/2019 06:03 AM    Potassium 3.4 (L) 07/26/2019 06:03 AM    Chloride 105 07/26/2019 06:03 AM    CO2 30 07/26/2019 06:03 AM    Glucose 116 (H) 07/26/2019 06:03 AM    BUN 41 (H) 07/26/2019 06:03 AM    Creatinine 1.35 (H) 07/26/2019 06:03 AM    Calcium 8.9 07/26/2019 06:03 AM    Magnesium 1.9 07/26/2019 06:03 AM    Phosphorus 3.4 07/26/2019 06:03 AM    Albumin 3.2 (L) 07/26/2019 06:03 AM     No results found for: HBA1C, HGBE8, ADJ3TNKA, LKJ8KHZJ, FAJ8YENR  Lab Results   Component Value Date/Time Glucose 116 (H) 07/26/2019 06:03 AM    Glucose (POC) 148 (H) 07/11/2013 09:36 PM      Lab Results   Component Value Date/Time    ALT (SGPT) 33 07/26/2019 06:03 AM    AST (SGOT) 37 07/26/2019 06:03 AM    Alk.  phosphatase 69 07/26/2019 06:03 AM    Bilirubin, total 0.5 07/26/2019 06:03 AM      Sabina Galeana RD [facility], Pager #[phone] or [phone]

## 2019-07-26 NOTE — CONSULTS
Cardiology Note dictated #[id number]  Impression:  1. Pulmonary edema  2. Chronic atrial fibrillation on apixaban  3. GIB  4. Renal disease, chronicity unknown  5. Abnormal EKG: has junctional escape rhythm and an old anteroseptal infarction  6.  On exam the patient has a heart murmur consistent with mitral regurgitation  Recommendations:  Admit to telemetry  Hold apixaban   Hold verapamil  Discontinue the po metolazone and the po furosemide  Echo to be done tomorrow  Renal and GI consults  Transfuse with IV bumetanide  Further recommendations to follow  Thank you for this referral  Samy Kay MD

## 2019-07-26 NOTE — NURSE NAVIGATOR
Chart reviewed by Heart Failure Nurse Navigator. Heart Failure database completed. EF:  pendng    ACEi/ARB/ARNi: echo pending    BB: echo pending    Aldosterone Antagonist: echo pending    Obstructive Sleep Apnea Screening: No   STOP-BANG score:   Referred to Sleep Medicine:     CRT not indicated    NYHA Functional Class requested via provider message on [employer]. Heart Failure Teach Back in Patient Education. Heart Failure Avoiding Triggers on Discharge Instructions. Cardiologist: RICH/Dr. Telma Barrios discharge follow up phone call to be made within 48-72 hours of discharge.

## 2019-07-26 NOTE — H&P
[address] [facility]  HISTORY AND PHYSICAL    Name:  Edgar Oakley  MR#:  [id number]  :  1926  ACCOUNT #:  [de-identified]  ADMIT DATE:  2019      The patient was seen, evaluated and admitted by me on the 2019. PRIMARY CARE PHYSICIAN:  Norma Palacios DO    SOURCE OF INFORMATION:  Patient and patient daughter who was present at the bedside. CHIEF COMPLAINT:  Shortness of breath. HISTORY OF PRESENT ILLNESS:  This is a 80-year-old man with past medical history significant for hypertension, hypothyroidism, chronic atrial fibrillation, colon cancer; status post colon resection, skin cancer, and chronic kidney disease was in his usual state of health until a few weeks ago when the patient developed shortness of breath. The shortness of breath is progressive and getting worse, especially with very minimal exertion. The shortness of breath is also worse when the patient is lying down. No associated chest pain. According to the patient's daughter who was present at the bedside, the patient had no respiratory symptoms until this past few weeks when he was diagnosed with congestive heart failure as possible cause of his shortness of breath. The patient was started on Lasix. The patient was also recently diagnosed with chronic kidney disease as well. The diuretic was recently adjusted because of the worsening shortness of breath. The patient denies fever, rigors and chills. He was brought to the emergency room for further evaluation. The patient was last admitted to the hospital in 2013. The patient was admitted to the surgical service and underwent colon resection secondary to colon cancer. When the patient arrived at the emergency room, his chest x-ray is suggestive of vascular congestion. The patient also has elevated BNP level. The patient was started on diuretic and was referred to the hospitalist service for evaluation for admission.   It was also reported that the patient fell at the assisted living facility where the patient resides yesterday. The patient denies passing out. The fall was accidental.    PAST MEDICAL HISTORY:  Hypertension, hypothyroidism, chronic kidney disease, chronic atrial fibrillation, colon cancer; status post resection, skin cancer; status post excision. ALLERGIES:  NO KNOWN DRUG ALLERGIES. MEDICATIONS:  1.  Eliquis 2.5 mg twice daily. 2.  Benadryl 25 mg daily as needed. 3.  Avodart 0.5 mg daily. 4.  Ferrous sulfate 325 mg twice daily. 5.  Lasix 80 mg daily. 6.  Hydralazine 12.5 mg three times daily. 7.  Synthroid 175 mcg daily. 8.  Lisinopril 40 mg daily. 9.  Melatonin 10 mg daily at bedtime. 10.  Zaroxolyn 5 mg daily. 11.  Prilosec 20 mg twice daily. 12.  Hytrin 10 mg daily. 13.  Thiamine 100 mg daily. 14.  Calan  mg daily. FAMILY HISTORY:  This was reviewed. His mother had colon cancer. His father had lung cancer. PAST SURGICAL HISTORY:  This is significant for coronary colectomy secondary to colon cancer and excision of basal cell carcinoma from the skin. SOCIAL HISTORY:  The patient is a former smoker, quit tobacco abuse in 07/1983. Admit to social consumption of alcohol. REVIEW OF SYSTEMS:  HEAD, EYES, EARS, NOSE AND THROAT:  No headache, no dizziness, no blurring of vision, and no photophobia. RESPIRATORY SYSTEM:  This is positive for shortness of breath and cough. No hemoptysis. CARDIOVASCULAR SYSTEM:  This is positive for orthopnea. No chest pain. No palpitation. GASTROINTESTINAL SYSTEM:  No nausea or vomiting. No diarrhea. No constipation. GENITOURINARY SYSTEM:  No dysuria, no urgency, and no frequency. All other systems are reviewed and they are negative. PHYSICAL EXAMINATION:  GENERAL APPEARANCE:  The patient appeared ill and in moderate distress.   VITAL SIGNS:  On arrival at the emergency room; temperature 97.4, pulse 54, respiratory rate 16, blood pressure 184/78, and oxygen saturation 98% on room air. HEAD:  Normocephalic, atraumatic. EYES:  Normal eye movements. No redness. No drainage. No discharge. EARS:  Normal external ears with no evidence of drainage. NOSE:  No deformity and no drainage. MOUTH AND THROAT:  No visible oral lesion. NECK:  Neck is supple. Mild JVD. No thyromegaly. CHEST:  Bilateral basilar crackles. No wheezing. HEART:  Normal S1 and S2.  Regular. No clinically appreciable murmur. ABDOMEN:  Soft and nontender. Normal bowel sounds. CNS:  Alert and oriented x3. No gross focal neurological deficit. EXTREMITIES:  Edema 2+. Pulses 2+ bilaterally. MUSCULOSKELETAL SYSTEM:  No obvious joint deformity or swelling. SKIN:  Right leg wound, left fourth and fifth toe wounds. Skin tear of left elbow noted. These findings were present on admission. PSYCHIATRY:  Normal mood and affect. LYMPHATIC SYSTEM:  No cervical lymphadenopathy. DIAGNOSTIC DATA:  EKG shows junctional rhythm. No significant ST and T-wave abnormalities. Chest x-ray shows pulmonary vascular congestion, bibasilar consolidation, and bilateral pleural effusions. LABORATORY DATA:  Cardiac profile; troponin is less than 0.05 and pro-BNP 3299. Chemistry; sodium 142, potassium 3.5, chloride 106, CO2 is 30, glucose 139, BUN 43, creatinine 1.51, and calcium 8.6. Stool occult blood positive. Hematology; WBC 4.9, hemoglobin at 7.9, hematocrit 25.9, and platelets 098. ASSESSMENT:  1. Acute on chronic congestive heart failure. 2.  Acute gastrointestinal bleed. 3.  Acute blood loss anemia. 4.  Hypothyroidism. 5.  Hypertension. 6.  Thrombocytopenia. 7.  Chronic kidney disease stage III. 8.  Chronic atrial fibrillation. 9.  Right leg wound. 10.  Wound, left fourth and fifth toes. 11.  Hyperglycemia. 15.  Fall. PLAN:  1. Acute-on-chronic systolic congestive heart failure. We will admit the patient for further evaluation and treatment.   This is most likely cause of the patient's shortness of breath. We will continue with diuretics. We will obtain echocardiogram to determine the ejection fraction and also to confirm whether the patient has systolic congestive heart failure or not. We will obtain serial cardiac markers to rule out acute myocardial infarction. We will await further recommendation from the cardiologist consulted by the emergency room physician. The patient is on Eliquis for anticoagulation for atrial fibrillation. Because of that, we will not evaluate the patient for thromboembolism as a possible cause of shortness of breath. The chest x-ray is suggesting consolidation, but the patient is afebrile. We will obtain a  noncontrast CT scan of the chest to evaluate the patient for pneumonia. 2.  Acute gastrointestinal bleed. The patient is guaiac positive in the emergency room. The patient is on Eliquis. We will hold Eliquis. We will start the patient on Protonix. Gastroenterology consult will be requested to assist in further evaluation and treatment of suspected acute GI bleed. 3.  Acute blood loss anemia. This is most likely coming from the GI bleed , it could also  be coming from the patient's chronic kidney disease. We will monitor the patient's hemoglobin and hematocrit serially. We will transfuse if indicated. 4.  Hypothyroidism. We will continue with Synthroid. We will check TSH level. 5.  Hypertension. We will resume preadmission medication. The patient will also be placed on hydralazine as needed for better blood pressure control. 6.  Thrombocytopenia. This is mild. We will monitor the patient's platelet count. 7.  Chronic kidney disease stage III. We will continue to monitor the patient's renal function. Nephrology consult will be requested to assist in further evaluation and treatment. 8.  Chronic atrial fibrillation. The patient appeared to be in normal sinus rhythm at present.   We will hold Eliquis because of the suspected GI bleed. We will check a TSH level. We will await the results of echocardiogram and further recommendation from the cardiologist.  9.  Right leg wound present on admission. Wound care consult will be requested for local wound care. 10.  Wound, left fourth and fifth toes. This was also present on admission. Wound care consult will be requested for local wound care. 11.  Hyperglycemia. We will check hemoglobin A1c level. The patient has no history of diabetes. 15.  Fall. We will check CT scan of the head, especially in this patient who is on anticoagulation to rule out acute pathology. 13.  Other issues. Code status: The patient is DNR. We will request SCD for DVT prophylaxis. FUNCTIONAL STATUS PRIOR TO ADMISSION:  The patient is ambulatory with a combination of cane and walker.       Bette Schilder, MD      [id number]  D:  07/26/2019 5:52  T:  07/26/2019 6:01  JOB #:  [id number]  CC:  Norma Guerrero DO

## 2019-07-26 NOTE — CONSULTS
[id number]- pt seen. Consult dictated    A:  Renal failure- ? Acute vs Chronic. No prior Cr available to compare. Even if this acute, he may end up likely having CKD going forward, given he has CHF (from Cardio-renal type of clinical picture). His URIEL more likely due to recent NSAIDs use (over 2 months) + pre-renal azotemia from CHF/MR    Acute CHF- ?systolic vs Diastolic  B/L pleural effusions  Hypokalemia  HTN  Anemia with +stool occult blood- ? UGI bleed 2nd to NSAIDs use  Afib    P:  Increase IV Lasix to 80 mg BID  D/C metolazone for now  Daily wt  Salt/fluid restriction  Daily labs  Check UA with micro  Given his age/uriel/anemia- check SPEP to screen for Myeloma (suspicion is low)  Renal fxn may get worse with ongoing diuresis, which he needs. But if there is significant decline in renal fxn, may have to decrease or stop Lisinopril  No nsaids now and even as outpt. Pt/dtr counseled  GI on board.  Ct PPI  If diuresis is not effective, may need IV drip +/- thoracentesis  Rate/rythm control with Afib as possible  PRN replace Jorge Alberto Steward MD  [facility]

## 2019-07-26 NOTE — ROUTINE PROCESS
TRANSFER - OUT REPORT:    Verbal report given to Blaise Harris (name) on Maddy Carrillo  being transferred to [address] [place] [address] (unit) for routine progression of care       Report consisted of patients Situation, Background, Assessment and   Recommendations(SBAR). Information from the following report(s) SBAR, ED Summary, [facility] and Recent Results was reviewed with the receiving nurse. Lines:   Peripheral IV 07/25/19 Left Forearm (Active)   Site Assessment Clean, dry, & intact 7/25/2019  5:30 PM   Phlebitis Assessment 0 7/25/2019  5:30 PM   Infiltration Assessment 0 7/25/2019  5:30 PM   Dressing Status Clean, dry, & intact 7/25/2019  5:30 PM   Dressing Type Transparent 7/25/2019  5:30 PM   Hub Color/Line Status Pink 7/25/2019  5:30 PM        Opportunity for questions and clarification was provided.       Patient transported with:   [employer]

## 2019-07-26 NOTE — CONSULTS
[address]    Name:  Kenney Bangura  MR#:  [id number]  :  1926  ACCOUNT #:  [de-identified]  DATE OF SERVICE:  2019    REFERRING PHYSICIAN:  Barbara Guillory MD    HISTORY OF PRESENT ILLNESS:  Consult was requested to assess this patient presented to the emergency room with pulmonary edema. Subsequent workup demonstrated a severe drop in his hemoglobin and heme positive stools and he is now being admitted to the Hospitalist Service. The patient has been living in [address] [place] [address] and was noted to have progressive dyspnea, edema, and congestion on his physical exam and other findings. He was already taking furosemide 80 mg daily, they added metolazone. This did not help very much. He was brought in today because his shortness of breath was getting worse. The patient is awake and alert, lying flat in the hospital bed and two daughters at the bedside. He denies any chest pain, orthopnea, diaphoresis, or syncope. The patient does have edema which is improved and he has some mild bruising and some bleeding on both arms. There is no history of myocardial infarction. He did have a TIA in remote past with no residual.  He is followed by Dr. Diana Eastman in our office and last saw him in 2019. He has chronic atrial fibrillation and has been maintained on apixaban twice a day. PAST MEDICAL HISTORY:  Includes,  1. Colon cancer with a resection in . 2.  Hypertension. The patient is awake and alert, and quite articulate. He is not in any discomfort, lying flat on \[facility] [place] [facility]\"". He denies any abdominal pain, back pain, chest pain, or any blurred vision or photophobia. MEDICATIONS AT HOME PRIOR TO ADMISSION:  1. Benadryl 25 mg as needed. 2.  Apixaban 2.5 mg two times a day. 3.  Hydralazine 12.5 mg three times a day. 4.  Ferrous sulfate 325 twice a day. 5.  Furosemide 80 mg daily. 6.  Levothyroxine 175 mcg daily. 7.  Lisinopril 40 mg daily.   8. Melatonin 10 mg nightly. 9.  Metolazone 5 mg daily. 10.  Omeprazole 20 mg b.i.d. 11.  Thiamine 100 mg daily. 12.  Kenalog cream.  13.  Verapamil  mg nightly. 14.  Cholecalciferol vitamin D. 15.  Dutasteride 0.5 mg daily. 16.  Terazosin 10 mg daily. ALLERGIES:  NO KNOWN ALLERGIES. PHYSICAL EXAMINATION:  GENERAL:  This is a well-developed, alert, elderly man. VITAL SIGNS:  Heart rate is currently 50, pulse 79, blood pressure is 166/75, respirations 17, sats 95% on room air. HEENT:  Unremarkable. NECK:  Neck veins are not distended. Carotid pulses are palpable. No bruits. CHEST:  Chest wall was unremarkable. LUNGS:  Diffuse rales and markedly decreased breath sounds bilaterally. HEART:  Regular moderate rhythm.  harsh systolic murmur extends from the left lower sternal border to the left axilla with muscle with muffled heart tones, irregular rhythm. No thrills, lifts, or heaves. ABDOMEN:  Soft and nontender. No bruits. No ascites. No palpable masses. EXTREMITIES:  He has multiple areas of bruising and ecchymoses. He has massive maddi bleeding on his underside of his left elbow. He has some bilateral lower extremity edema that was same on both legs. He has some Band-Aid over an area in his right lower leg. Pedal pulses are palpable and trace to 1+. NEUROLOGIC:  The patient is awake, alert, appropriate. No focal motor signs. Normal speech. EKG demonstrates an escape junctional rhythm, underlying atrial fibrillation, left axis deviation, anteroseptal infarction pattern. LABORATORY DATA:  His labs include a troponin level that was less than 0.05, heme positive stools, hemoglobin of 7.9, hematocrit 25.9, platelets 343,403. IMPRESSION:  The patient presents with severe anemia, the chronicity is unknown. He has heme positive stools, but no maddi bleeding. He has a history of colon cancer resection in 2012.   The patient has marked bilateral pleural effusions and pulmonary edema.      RECOMMENDATIONS:  1. Admit to the hospitalist.  2.  Have the nephrologist see the patient for his renal insufficiency. 3.  GI to see the patient. 4.  An echocardiogram will be done and I would discontinue the metolazone and the p.o. furosemide and use Bumex drip for several hours and reassess. Further recommendations to follow.     Thank you for this referral.      Jac Hart MD [id number]  D:  07/25/2019 20:08  T:  07/25/2019 23:57  JOB #:  [id number]

## 2019-07-26 NOTE — PROGRESS NOTES
Hospitalist Progress Note  Jannie Bar MD  Answering service: [phone] OR [phone] from in house phone        Date of Service:  2019  NAME:  Jessy Saini  :  1926  MRN:  [id number]      Admission Summary:    70-year-old man with past medical history significant for hypertension, hypothyroidism, chronic atrial fibrillation, colon cancer; status post colon resection, skin cancer, and chronic kidney disease was in his usual state of health until a few weeks ago when the patient developed shortness of breath. Interval history / Subjective:     Patient still short of breath,has some conversational dyspnea. Denied any chest pain,nausea/vomiting. Assessment & Plan:     #Acute on chronic diastolic heart failure:NYHA class 4 at presentation  #Biltaeral pleural effusion:  CT chest showed bilateral moderate to large pleural effusions with left lower lobe atelectasis. -echo shows concentric hypertrophy of the left ventricle  -Lasix increased to 80 mg BID  -strict I and O  -daily weights. #Renal failure- acute vs chronic:  Acute - cardio renal vs use of NSAIDs, may be he also has some degree of CKD. -last known cr is 0.83 in 2013  -cr trending down    #Atrial fib :  -hold eliquis for now due to anemia-concern for GI bleed  -EKG shows junctional rythym - HR 50s - will discontinue verapamil. #Anemia:  -Hb 7.9 at admission,unknown base line  -stool occult positive  -was taking NSAIDs PTA and on eliquis  -Hb stable 8.5  -Q 12 hr H and H  -GI following -probably will need EGD once resp issues better. #HTN:  -BP high in the morning  -Hold lisinopril for now due to renal issues  -Increased hydralazine to 25 mg TID.   Restart cardura if remains high     #Hypothyroiism:  -synthroid    Code status: DNR  DVT prophylaxis: SCD    Care Plan discussed with: patient,ayla at bed side  Disposition:TBD     Hospital Problems  Date Reviewed: 7/25/2019          Codes Class Noted POA    * (Principal) Acute on chronic systolic CHF (congestive heart failure) ([facility]) ICD-10-CM: I50.23  ICD-9-CM: 428.23, 428.0  7/25/2019 Yes                Review of Systems:   As per HPI      Vital Signs:    Last 24hrs VS reviewed since prior progress note. Most recent are:  Visit Vitals  /64 (BP 1 Location: Left arm, BP Patient Position: Sitting)   Pulse 63   Temp 97.3 °F (36.3 °C)   Resp 20   Ht 6' (1.829 m)   Wt 75 kg (165 lb 5.5 oz)   SpO2 99%   BMI 22.42 kg/m²         Intake/Output Summary (Last 24 hours) at 7/26/2019 1706  Last data filed at 7/26/2019 1246  Gross per 24 hour   Intake 240 ml   Output 2125 ml   Net -1885 ml        Physical Examination:             Constitutional:  No acute distress   ENT:  Oral mucous moist, oropharynx benign. Resp:  b/l decreased breath sounds,    CV:  Regular rhythm, slightly low heart rate    GI:  Soft, non distended, non tender. normoactive bowel sounds    Musculoskeletal:  1-2+ b/l LE edema    Neurologic:  Moves all extremities. AAOx3     Psych:   Not anxious nor agitated. Data Review:    Review and/or order of clinical lab test  Review and/or order of tests in the radiology section of CPT  Review and/or order of tests in the medicine section of CPT      Labs:     Recent Labs     07/26/19  1216 07/26/19  0603  07/25/19  1735   WBC  --  4.8  --  4.9   HGB 8.5* 7.9*   < > 7.9*   HCT 27.6* 26.3*   < > 25.9*   PLT  --  154  --  148*    < > = values in this interval not displayed. Recent Labs     07/26/19  0603 07/25/19  1735    142   K 3.4* 3.5    106   CO2 30 30   BUN 41* 43*   CREA 1.35* 1.51*   * 139*   CA 8.9 8.6   MG 1.9  --    PHOS 3.4  --      Recent Labs     07/26/19  0603   SGOT 37   ALT 33   AP 69   TBILI 0.5   TP 6.4   ALB 3.2*   GLOB 3.2     No results for input(s): INR, PTP, APTT in the last 72 hours.     No lab exists for component: INREXT   Recent Labs     07/26/19  0603   FERR 132 Lab Results   Component Value Date/Time    Folate 9.8 07/26/2019 06:03 AM      No results for input(s): PH, PCO2, PO2 in the last 72 hours.   Recent Labs     07/26/19  0603 07/26/19  0050 07/25/19  1735   TROIQ <0.05 <0.05 <0.05     Lab Results   Component Value Date/Time    Cholesterol, total 142 07/26/2019 06:03 AM    HDL Cholesterol 79 07/26/2019 06:03 AM    LDL, calculated 56.2 07/26/2019 06:03 AM    Triglyceride 34 07/26/2019 06:03 AM    CHOL/HDL Ratio 1.8 07/26/2019 06:03 AM     Lab Results   Component Value Date/Time    Glucose (POC) 148 (H) 07/11/2013 09:36 PM     Lab Results   Component Value Date/Time    Color YELLOW/STRAW 07/26/2019 12:58 PM    Appearance CLEAR 07/26/2019 12:58 PM    Specific gravity 1.008 07/26/2019 12:58 PM    pH (UA) 7.5 07/26/2019 12:58 PM    Protein NEGATIVE  07/26/2019 12:58 PM    Glucose NEGATIVE  07/26/2019 12:58 PM    Ketone NEGATIVE  07/26/2019 12:58 PM    Bilirubin NEGATIVE  07/26/2019 12:58 PM    Urobilinogen 1.0 07/26/2019 12:58 PM    Nitrites NEGATIVE  07/26/2019 12:58 PM    Leukocyte Esterase NEGATIVE  07/26/2019 12:58 PM         Medications Reviewed:     Current Facility-Administered Medications   Medication Dose Route Frequency    cholecalciferol (VITAMIN D3) tablet 2,000 Units  2,000 Units Oral DAILY    diphenhydrAMINE (BENADRYL) capsule 25 mg  25 mg Oral QHS PRN    dutasteride (AVODART) capsule 0.5 mg  0.5 mg Oral DAILY    ferrous sulfate tablet 325 mg  325 mg Oral BID    levothyroxine (SYNTHROID) tablet 175 mcg  175 mcg Oral ACB    melatonin tablet 9 mg  9 mg Oral QHS    pantoprazole (PROTONIX) 40 mg in sodium chloride 0.9% 10 mL injection  40 mg IntraVENous Q12H    thiamine HCL (B-1) tablet 100 mg  100 mg Oral DAILY    sodium chloride (NS) flush 5-40 mL  5-40 mL IntraVENous Q8H    sodium chloride (NS) flush 5-40 mL  5-40 mL IntraVENous PRN    acetaminophen (TYLENOL) tablet 650 mg  650 mg Oral Q4H PRN    ondansetron (ZOFRAN) injection 4 mg  4 mg IntraVENous Q4H PRN    bisacodyl (DULCOLAX) tablet 5 mg  5 mg Oral DAILY PRN    hydrALAZINE (APRESOLINE) 20 mg/mL injection 10 mg  10 mg IntraVENous Q6H PRN    hydrALAZINE (APRESOLINE) tablet 25 mg  25 mg Oral TID    furosemide (LASIX) injection 80 mg  80 mg IntraVENous BID    potassium chloride SR (KLOR-CON 10) tablet 20 mEq  20 mEq Oral DAILY     ______________________________________________________________________  EXPECTED LENGTH OF STAY: 3d 7h  ACTUAL LENGTH OF STAY:          1                 Cinthia Shultz MD

## 2019-07-26 NOTE — PROGRESS NOTES
2230 Rec pt from ED via stretcher. Ambulated to bed with standby assist. Oriented to enviroment. Denies c/o discomfort. O2 sat 93-95% on room air. Lungs clear except diminished lower lobes. History obtained from pt and dtr Jessica. Bp 179/72 Hr 57 AF.  0030 Dr Geraldine Sánchez notified of HR 39. For cardiology consult. H/H 8.2/26. 9. Trop <0.05.  0445 /99. Dr Geraldine Sánchez notified. Apresoline 10mg IV given.

## 2019-07-26 NOTE — CONSULTS
[address]    Name:  Prashanth Leija  MR#:  [id number]  :  1926  ACCOUNT #:  [de-identified]  DATE OF SERVICE:  2019      RENAL CONSULTATION    REQUESTING PHYSICIAN:  Sonja Cisneros MD    CHIEF COMPLAINT:  Evaluation and management of renal failure. The patient was seen and examined. History obtained from the patient, daughter, and chart review. HISTORY OF PRESENT ILLNESS:  The patient is a 77-year-old  male with past medical history significant  for hypertension, AFib, colon cancer, who presented to ED with a chief complaint of shortness of breath. This was getting worse over the last 2 weeks. He was also found to be anemic 2 weeks ago and was started on oral iron as outpatient. He was taking daily Aleve for last 2 months to help him \"sleep. \"    He was found to have evidence of CHF with chest x-ray showing pleural effusion as well as pulmonary edema and was started on Lasix. He also had renal failure with BUN of 43 and creatinine of 1.51 on admit, it has since improved to 1.35 today. He denies any orthopnea or PND. He is still dyspneic but able to lie flat without any oxygen. He denies any recent vomiting or diarrhea. No overt blood in the stool, but his stool was positive for occult blood. GI has been consulted. He was also on PPI recently. He denies any trouble with urination. No blood in the urine. No fevers or chills. Positive for cough with occasional sputum. No hemoptysis. REVIEW OF SYSTEMS:  Remainder of review of systems obtained and negative.     Past Medical History:   Diagnosis Date    Arrhythmia     irrigular heart beat- A FIB    Cancer ([facility] [address]     COLON     Cancer [facility])     SKIN    Colon cancer ([facility] [address] 2013    Hypertension      Past Surgical History:   Procedure Laterality Date    HX OTHER SURGICAL      several basal cell removals & cyst removed from chest    HX TONSIL AND ADENOIDECTOMY      AS CHILD     No Known Allergies  Medication Documentation Review Audit     Reviewed by DONNELL MatamorosD (Pharmacist) on 07/25/19 at 2023    Medication Sig Documenting Provider Last Dose Status Taking? apixaban (ELIQUIS) 2.5 mg tablet Take 2.5 mg by mouth two (2) times a day. Provider, Historical 7/25/2019 Unknown time Active Yes   cholecalciferol, vitamin D3, (VITAMIN D3) 2,000 unit tab Take 1 Tab by mouth daily. Provider, Historical 7/25/2019 Unknown time Active Yes   diphenhydrAMINE (BENADRYL ALLERGY) 25 mg tablet Take 25 mg by mouth nightly as needed for Sleep. Provider, Historical  Active Yes   dutasteride (AVODART) 0.5 mg capsule Take 0.5 mg by mouth daily. Provider, Historical 7/25/2019 Unknown time Active Yes   ferrous sulfate 325 mg (65 mg iron) tablet Take 325 mg by mouth two (2) times a day. Provider, Historical 7/25/2019 Unknown time Active Yes   furosemide (LASIX) 80 mg tablet Take 80 mg by mouth daily. Provider, Historical 7/25/2019 Unknown time Active Yes   hydrALAZINE (APRESOLINE) 25 mg tablet Take 12.5 mg by mouth three (3) times daily. Provider, Historical 7/25/2019 Unknown time Active Yes   levothyroxine (SYNTHROID) 175 mcg tablet Take 175 mcg by mouth Daily (before breakfast). Provider, Historical 7/25/2019 Unknown time Active Yes   lisinopril (PRINIVIL, ZESTRIL) 40 mg tablet Take 40 mg by mouth daily. Provider, Historical 7/25/2019 Unknown time Active Yes   melatonin 10 mg tab Take 1 Tab by mouth nightly. Provider, Historical 7/24/2019 Unknown time Active Yes   metOLazone (ZAROXOLYN) 5 mg tablet Take 5 mg by mouth daily. Provider, Historical 7/25/2019 Unknown time Active Yes   omeprazole (PRILOSEC) 20 mg capsule Take 20 mg by mouth two (2) times a day. Provider, Historical 7/25/2019 Unknown time Active Yes   terazosin (HYTRIN) 10 mg capsule Take 10 mg by mouth daily.  Indications: high blood pressure Provider, Historical 7/24/2019 Unknown time Active Yes           Med Note (Nito Favorite   Thu Jul 25, 2019 7:47 PM)     thiamine HCL (B-1) 100 mg tablet Take 100 mg by mouth daily. Provider, Historical 2019 Unknown time Active Yes   triamcinolone acetonide (KENALOG) 0.1 % topical cream Apply  to affected area two (2) times daily as needed for Skin Irritation or Itching. use thin layer Provider, Historical  Active Yes   verapamil ER (CALAN-SR) 180 mg CR tablet Take 180 mg by mouth daily. Provider, Historical 2019 Unknown time Active Yes              Social History     Tobacco Use    Smoking status: Former Smoker     Packs/day: 0.50     Years: 32.00     Pack years: 16.00     Types: Cigarettes     Last attempt to quit: 1983     Years since quittin.0    Smokeless tobacco: Never Used   Substance Use Topics    Alcohol use: Yes     Comment: edie/gin 7 per week    Drug use: No     Family History   Problem Relation Age of Onset   [facility] Cancer Mother         colon   [facility] Cancer Father         lung    Cancer Brother         UNKNOWN         PHYSICAL EXAMINATION:  GENERAL:  Elderly male, frail, who is mildly dyspneic towards the end of sentences. He is in no acute distress. RECENT VITAL SIGNS:  He is afebrile. Pulse 50-70s, -659 systolic, 54-25 diastolic, respirations of 20, saturating 93%-95% on room air. Weight is 75.5 kg. HEENT:  Head is atraumatic, normocephalic. Conjunctivae pale. No scleral icterus. Mucous membranes are moist.  Extraocular muscles are intact. No oral exudate. NECK:  No JVD. No cervical lymphadenopathy. LUNGS:  Have diminished breath sounds at both bases with few bibasilar rales. Mild tachypnea towards the end of sentences. HEART:  Irregular rhythm. Mild intermittent bradycardia. Positive systolic murmur over left sternal border. No pericardial rub. ABDOMEN:  Soft, nontender. No abdominal bruits. Active bowel sounds. EXTREMITIES:  No clubbing or cyanosis. Edema 1 to 2+. SKIN:  He has wound on his right leg and left fourth and fifth toe.   He has scattered ecchymosis. He also has skin tear of the left elbow. PSYCHIATRIC:  Normal mood and affect. CENTRAL NERVOUS SYSTEM:  Alert and oriented x3. DIAGNOSTIC DATA:  EKG shows junctional rhythm. Chest x-ray showed pulmonary vascular congestion, bibasilar consolidation, and bilateral pleural effusions. Chest CT without contrast showed no evidence of pneumonia but did show bilateral moderate-to-large pleural effusions with left lower lobe atelectasis, atherosclerotic ascending aorta, and indeterminate age T11 compression fracture, and cardiomegaly. CT head was negative for any acute process. I did not have any initial urinalysis, which was ordered by me. ASSESSMENT:  1. Renal failure. We do not have previous baseline creatinine to compare. I suspect he may have either acute kidney injury versus a component of acute kidney injury on mild chronic kidney disease. His acute kidney injury is likely secondary to chronic nonsteroidal antiinflammatory drugs use over 2 months and also prerenal azotemia because of congestive heart failure. He is exhibiting cardiorenal features. Even if he does not have any preexisting chronic kidney disease, he may eventually end up developing chronic kidney disease because of underlying congestive heart failure and likely valvular disease, which is more suggestive of mitral regurgitation on the exam.  Suspicion for obstruction is low. 2.  Hypokalemia, which is mild. 3.  Anemia with positive stool for occult blood. 4.  Bilateral moderate-to-severe large pleural effusions. 5.  Hypertension. 6.  Acute congestive heart failure. We do not know his left ventricular function and hence not clear if it is systolic versus diastolic. RECOMMENDATIONS:  1. Continue loop diuretics. He is still dyspneic and we will increase the dose of IV Lasix to 80 mg b.i.d.  2.  Replace KCl.  3.  DC metolazone, while we use high-dose loop diuretics. 4.  Salt and fluid restriction.   5.  Daily weight. 6.  Daily labs. 7.  If there is worsening of renal function, we may have to cut back or even stop lisinopril, and in case of that, we will have to consider using nitrates/hydralazine combination. 8.  Check UA with microscopy to look for any evidence of proteinuria or hematuria. 9.  Given his age, anemia, and renal failure, we will screen him for myeloma with SPEP, although suspicion is very low. 10.  Counseled to avoid NSAIDs even as outpatient in the future. Thanks for renal consultation. Discussed with the patient, daughter, and Dr. Debbie Montero. We will follow the patient with you.       Chaka Grimes MD      [id number]  D:  07/26/2019 14:09  T:  07/26/2019 15:02  JOB #:  [id number]

## 2019-07-26 NOTE — WOUND CARE
Wound Consult:  New Patient Visit. Chart reviewed. Consulted for skin tears - patient with falls prior to admission. Spoke with patients nurse,  Cecille Blackmon to hand off on visit. Patient is resting on a total care bed. Standing at side of bed with PCT to void upon entering room and patient was able to get back into bed and position with only standby assistance. Assessment/Treatment: :  Left elbow - 2 x 1 x 0.1 cm, with partial skin flap, moist red wound bed, serosanguinous drainage in small amount, placed Mepitel one to secure flap and then a border over for drainage. Right elbow - 1 x 1 x 0.1 cm pink moist skin tear with small dry scab just distally. No drainage - Mepitel one over both areas. Right anterior lower leg - less than 1 cm nearly healed wound, pink. No drainage. Surrounding skin intact. He has some chronic discoloration to lower legs but no acute redness or inflammation. Abrasions to left 4th and 5th toes on dorsal surfaces - dry. Heels intact, no redness. Sacral area - blanching pink in lower sacral area, no redness to buttocks. Wound Recommendations:  Mepilex Border dressing to left elbow and right lower leg, change every three days. Mepitel One to right elbow area, change weekly. Skin Care Recommendations:  1. Minimize friction/shear: minimize layers of linen/pads under patient. 2. Off load pressure/reposition: continue to turn and reposition approximately every 2 hours; float heels; waffle cushion for sitting. 3. Manage Moisture - keep skin folds dry; promote continence - rounding and toilet assistance. Barrier creams as needed. 4. Continue to monitor nutrition, pain, and skin risk scale, and skin assessment. Plan: Will discuss with MD.  We will continue to reassess routinely and as needed.   Nico Velez, [facility] Office [phone]  Pager [phone]

## 2019-07-26 NOTE — PROGRESS NOTES
Admission Medication Reconciliation:    Information obtained from:  patient/Transfer paper and RN from [address] [place] [address]     Comments:     1) Used transfer paper, along with input from family and patient (who were good historian) and RN from facility to update PTA medication list. Included the last home doses below    2)  Medication changes (since last review): Added  - metolazone (5 day therapy, started on 24th)  -lasix  -Elquis (A-fib)  -Synthroid  -hydralazine, lisinopril  -Prilosec  -Vitamin D3  -Benadryl     Adjusted  - Terazosin dose    Removed  - digoxin   -norco  -lunesta    3) Patient does NOT have \"type 1 diabetes\" as the transfer paper suggests. Rather, patient is pre-diabetic. (confirmed with patient and family member)         Allergies:  Patient has no known allergies. Significant PMH/Disease States:   Past Medical History:   Diagnosis Date    Arrhythmia     irrigular heart beat- A FIB    Cancer ([facility] [address] 2013    COLON     Cancer ([facility] [address]     SKIN    Colon cancer ([facility] [address] 7/1/2013    Hypertension        Chief Complaint for this Admission:    Chief Complaint   Patient presents with    Shortness of Breath    Leg Swelling       Prior to Admission Medications:   Prior to Admission Medications   Prescriptions Last Dose Informant Patient Reported? Taking? apixaban (ELIQUIS) 2.5 mg tablet 7/25/2019 at Unknown time  Yes Yes   Sig: Take 2.5 mg by mouth two (2) times a day. cholecalciferol, vitamin D3, (VITAMIN D3) 2,000 unit tab 7/25/2019 at Unknown time  Yes Yes   Sig: Take 1 Tab by mouth daily. diphenhydrAMINE (BENADRYL ALLERGY) 25 mg tablet   Yes Yes   Sig: Take 25 mg by mouth nightly as needed for Sleep. dutasteride (AVODART) 0.5 mg capsule 7/25/2019 at Unknown time  Yes Yes   Sig: Take 0.5 mg by mouth daily. ferrous sulfate 325 mg (65 mg iron) tablet 7/25/2019 at Unknown time  Yes Yes   Sig: Take 325 mg by mouth two (2) times a day.    furosemide (LASIX) 80 mg tablet 7/25/2019 at Unknown time  Yes Yes   Sig: Take 80 mg by mouth daily. hydrALAZINE (APRESOLINE) 25 mg tablet 7/25/2019 at Unknown time  Yes Yes   Sig: Take 12.5 mg by mouth three (3) times daily. levothyroxine (SYNTHROID) 175 mcg tablet 7/25/2019 at Unknown time  Yes Yes   Sig: Take 175 mcg by mouth Daily (before breakfast). lisinopril (PRINIVIL, ZESTRIL) 40 mg tablet 7/25/2019 at Unknown time  Yes Yes   Sig: Take 40 mg by mouth daily. melatonin 10 mg tab 7/24/2019 at Unknown time  Yes Yes   Sig: Take 1 Tab by mouth nightly. metOLazone (ZAROXOLYN) 5 mg tablet 7/25/2019 at Unknown time  Yes Yes   Sig: Take 5 mg by mouth daily. omeprazole (PRILOSEC) 20 mg capsule 7/25/2019 at Unknown time  Yes Yes   Sig: Take 20 mg by mouth two (2) times a day. terazosin (HYTRIN) 10 mg capsule 7/24/2019 at Unknown time  Yes Yes   Sig: Take 10 mg by mouth daily. Indications: high blood pressure   thiamine HCL (B-1) 100 mg tablet 7/25/2019 at Unknown time  Yes Yes   Sig: Take 100 mg by mouth daily. triamcinolone acetonide (KENALOG) 0.1 % topical cream   Yes Yes   Sig: Apply  to affected area two (2) times daily as needed for Skin Irritation or Itching. use thin layer   verapamil ER (CALAN-SR) 180 mg CR tablet 7/24/2019 at Unknown time  Yes Yes   Sig: Take 180 mg by mouth daily.       Facility-Administered Medications: None

## 2019-07-26 NOTE — ED NOTES
RN cleansed and dressed left elbow and right elbow with petrolatum non adherent dressing. Elbows are wrapped with gauze and coban. 4th toe on left foot cleansed and wrapped with gauze.

## 2019-07-27 NOTE — PROGRESS NOTES
Name: Mikala Baumann   MRN: [id number]  : 1926      Assessment:  Renal failure- ? Acute vs Chronic. No prior Cr available to compare. Even if this acute, he may end up likely having CKD going forward, given he has CHF (from Cardio-renal type of clinical picture). His URIEL more likely due to recent NSAIDs use (over 2 months) + pre-renal azotemia from CHF/MR  UA is benign    Acute on Chronic diastolic CHF- Echo with nl EF  B/L pleural effusions  Hypokalemia  HTN  Anemia with +stool occult blood- ? UGI bleed 2nd to NSAIDs use  Afib     Plan/Recommendations:  Ct IV Lasix 80 mg BID. Transition to PO in 1-2 days  He will need 80 mg PO BID at home (home dose was 80 mg every day)  Increase oral kcl to 40 meq daily  Daily wt  Ct Salt/fluid restriction  AM labs  F/u SPEP  No NSAIDS as outpt  GI on board. Ct PPI  If diuresis is not effective, may need IV drip +/- thoracentesis  Rate/rythm control with Afib as possible    Subjective:  oob in chair. Mild confused.  Thinks he is going home  \"I have little moisture around my heart\"    ROS:   No nausea, no vomiting  No chest pain, improved  shortness of breath    Exam:  Visit Vitals  /68 (BP 1 Location: Right arm, BP Patient Position: At rest)   Pulse 90   Temp 98.9 °F (37.2 °C)   Resp 18   Ht 6' (1.829 m)   Wt 71.2 kg (156 lb 15.5 oz)   SpO2 94%   BMI 21.29 kg/m²       Elderly frail, in NAD  No icterus, mmm  Clear except dec BS at bases  RRR, +SM  No edema  Alert awake  Mild confused    Current Facility-Administered Medications   Medication Dose Route Frequency Last Dose    doxazosin (CARDURA) tablet 1 mg  1 mg Oral QHS      lisinopril (PRINIVIL, ZESTRIL) tablet 20 mg  20 mg Oral DAILY 20 mg at 19 0857    [START ON 2019] potassium chloride SR (KLOR-CON 10) tablet 40 mEq  40 mEq Oral DAILY      cholecalciferol (VITAMIN D3) tablet 2,000 Units  2,000 Units Oral DAILY 2,000 Units at 07/27/19 0857    diphenhydrAMINE (BENADRYL) capsule 25 mg  25 mg Oral QHS PRN      dutasteride (AVODART) capsule 0.5 mg  0.5 mg Oral DAILY 0.5 mg at 07/27/19 0857    ferrous sulfate tablet 325 mg  325 mg Oral  mg at 07/27/19 0857    levothyroxine (SYNTHROID) tablet 175 mcg  175 mcg Oral  mcg at 07/27/19 0715    melatonin tablet 9 mg  9 mg Oral QHS 9 mg at 07/26/19 2207    pantoprazole (PROTONIX) 40 mg in sodium chloride 0.9% 10 mL injection  40 mg IntraVENous Q12H 40 mg at 07/27/19 0326    thiamine HCL (B-1) tablet 100 mg  100 mg Oral DAILY 100 mg at 07/27/19 0857    sodium chloride (NS) flush 5-40 mL  5-40 mL IntraVENous Q8H 10 mL at 07/26/19 2207    sodium chloride (NS) flush 5-40 mL  5-40 mL IntraVENous PRN      acetaminophen (TYLENOL) tablet 650 mg  650 mg Oral Q4H PRN      ondansetron (ZOFRAN) injection 4 mg  4 mg IntraVENous Q4H PRN      bisacodyl (DULCOLAX) tablet 5 mg  5 mg Oral DAILY PRN      hydrALAZINE (APRESOLINE) 20 mg/mL injection 10 mg  10 mg IntraVENous Q6H PRN 10 mg at 07/26/19 0514    hydrALAZINE (APRESOLINE) tablet 25 mg  25 mg Oral TID 25 mg at 07/27/19 0858    furosemide (LASIX) injection 80 mg  80 mg IntraVENous BID 80 mg at 07/27/19 0858       Labs/Data:    Lab Results   Component Value Date/Time    WBC 7.8 07/27/2019 03:27 AM    HGB 8.7 (L) 07/27/2019 03:27 AM    HCT 27.9 (L) 07/27/2019 03:27 AM    PLATELET 444 78/63/6599 03:27 AM    MCV 91.5 07/27/2019 03:27 AM       Lab Results   Component Value Date/Time    Sodium 141 07/27/2019 03:27 AM    Potassium 3.2 (L) 07/27/2019 03:27 AM    Chloride 102 07/27/2019 03:27 AM    CO2 32 07/27/2019 03:27 AM    Anion gap 7 07/27/2019 03:27 AM    Glucose 127 (H) 07/27/2019 03:27 AM    BUN 38 (H) 07/27/2019 03:27 AM    Creatinine 1.31 (H) 07/27/2019 03:27 AM    BUN/Creatinine ratio 29 (H) 07/27/2019 03:27 AM    GFR est AA >60 07/27/2019 03:27 AM    GFR est non-AA 51 (L) 07/27/2019 03:27 AM    Calcium 9.6 07/27/2019 03:27 AM       Wt Readings from Last 3 Encounters:   07/27/19 71.2 kg (156 lb 15.5 oz)   07/15/13 91.4 kg (201 lb 8 oz)   07/02/13 85.3 kg (188 lb)         Intake/Output Summary (Last 24 hours) at 7/27/2019 1254  Last data filed at 7/27/2019 1005  Gross per 24 hour   Intake 240 ml   Output    Net 240 ml       Patient seen and examined. Chart reviewed. Labs, data and other pertinent notes reviewed in last 24 hrs.     PMH/SH/FH reviewed and unchanged compared to H&P    Discussed with pt and Dr. Phyllis Vasques MD

## 2019-07-27 NOTE — PROGRESS NOTES
Problem: Falls - Risk of  Goal: *Absence of Falls  Description  Document Kevon Ramirez Fall Risk and appropriate interventions in the flowsheet.   Outcome: Progressing Towards Goal  Note:   Fall Risk Interventions:  Mobility Interventions: Communicate number of staff needed for ambulation/transfer, Patient to call before getting OOB         Medication Interventions: Patient to call before getting OOB, Teach patient to arise slowly    Elimination Interventions: Call light in reach, Patient to call for help with toileting needs, Urinal in reach    History of Falls Interventions: Door open when patient unattended

## 2019-07-27 NOTE — PROGRESS NOTES
Hospitalist Progress Note  Erika Vides MD  Answering service: [phone] OR [phone] from in house phone        Date of Service:  2019  NAME:  Erica Elmore  :  1926  MRN:  [id number]      Admission Summary:    80-year-old man with past medical history significant for hypertension, hypothyroidism, chronic atrial fibrillation, colon cancer; status post colon resection, skin cancer, and chronic kidney disease was in his usual state of health until a few weeks ago when the patient developed shortness of breath. Interval history / Subjective:     Per nursing,patient is fully dressed and came to the nurses station -as he thought  he is getting discharged. On my eval,patient continues to states that there is some one from the facility is coming to pick him and he is getting discharged. Though he is alert, oriented X 3 - he appears confused -likely has dementia at baseline but easily reoriented. Wife and daughter at bedside - discussed that he needs to remain inpatient for diuretics and GI eval for anemia    Patient states that his breathing is better today,swelling has decreased. Assessment & Plan:     #Acute on chronic diastolic heart failure:NYHA class 4 at presentation  #Biltaeral pleural effusion:  CT chest showed bilateral moderate to large pleural effusions with left lower lobe atelectasis. -echo shows concentric hypertrophy of the left ventricle  -Diuresing well on IV lasix 80 mg BID  -strict I and O  -daily weights. #Renal failure- acute vs chronic:  Acute - cardio renal vs use of NSAIDs, may be he also has some degree of CKD. -last known cr is 0.83 in 2013  -cr stable    #Atrial fib :  -hold eliquis for now due to anemia-concern for GI bleed  -EKG shows junctional rythym -discontinued verapamil -HR better now.     #Anemia:  -Hb 7.9 at admission,unknown base line  -stool occult positive  -was taking NSAIDs PTA and on eliquis  -Hb stable 8.7  -Q 12 hr H and H  -GI following -probably will need EGD once resp issues better as we need to decide if anticoagulation can be resumed. #HTN:  -BP better now. Continue amlodipine,lisnipril 20 and cardura 1mg. #Hypothyroiism:  -synthroid    Code status: DNR  DVT prophylaxis: SCD    Care Plan discussed with: patient,fdamily at bed side  Disposition:TBD     Hospital Problems  Date Reviewed: 7/25/2019          Codes Class Noted POA    * (Principal) Acute on chronic systolic CHF (congestive heart failure) (HCC) ICD-10-CM: I50.23  ICD-9-CM: 428.23, 428.0  7/25/2019 Yes                Review of Systems:   As per HPI      Vital Signs:    Last 24hrs VS reviewed since prior progress note. Most recent are:  Visit Vitals  /68 (BP 1 Location: Right arm, BP Patient Position: At rest)   Pulse 90   Temp 98.9 °F (37.2 °C)   Resp 18   Ht 6' (1.829 m)   Wt 71.2 kg (156 lb 15.5 oz)   SpO2 94%   BMI 21.29 kg/m²         Intake/Output Summary (Last 24 hours) at 7/27/2019 1530  Last data filed at 7/27/2019 1005  Gross per 24 hour   Intake 240 ml   Output    Net 240 ml        Physical Examination:             Constitutional:  No acute distress   ENT:  Oral mucous moist, oropharynx benign. Resp:  b/l decreased breath sounds at bases,    CV:  Regular rhythm, normal heart rate    GI:  Soft, non distended, non tender. normoactive bowel sounds    Musculoskeletal:   LE edema improving    Neurologic:  Moves all extremities. AAOx3     Psych:   Not anxious nor agitated.        Data Review:    Review and/or order of clinical lab test  Review and/or order of tests in the medicine section of [facility]      Labs:     Recent Labs     07/27/19  0327 07/26/19  1216 07/26/19  0603   WBC 7.8  --  4.8   HGB 8.7* 8.5* 7.9*   HCT 27.9* 27.6* 26.3*     --  154     Recent Labs     07/27/19  0327 07/26/19  0603 07/25/19  1735    141 142   K 3.2* 3.4* 3.5    105 106   CO2 32 30 30   BUN 38* 41* 43*   CREA 1.31* 1.35* 1.51*   * 116* 139*   CA 9.6 8.9 8.6   MG 1.8 1.9  --    PHOS 3.5 3.4  --      Recent Labs     07/26/19  0603   SGOT 37   ALT 33   AP 69   TBILI 0.5   TP 6.4   ALB 3.2*   GLOB 3.2     No results for input(s): INR, PTP, APTT in the last 72 hours. No lab exists for component: Jennifer Like   Recent Labs     07/26/19  0603   FERR 132      Lab Results   Component Value Date/Time    Folate 9.8 07/26/2019 06:03 AM      No results for input(s): PH, PCO2, PO2 in the last 72 hours.   Recent Labs     07/26/19  0603 07/26/19  0050 07/25/19  1735   TROIQ <0.05 <0.05 <0.05     Lab Results   Component Value Date/Time    Cholesterol, total 142 07/26/2019 06:03 AM    HDL Cholesterol 79 07/26/2019 06:03 AM    LDL, calculated 56.2 07/26/2019 06:03 AM    Triglyceride 34 07/26/2019 06:03 AM    CHOL/HDL Ratio 1.8 07/26/2019 06:03 AM     Lab Results   Component Value Date/Time    Glucose (POC) 148 (H) 07/11/2013 09:36 PM     Lab Results   Component Value Date/Time    Color YELLOW/STRAW 07/26/2019 12:58 PM    Appearance CLEAR 07/26/2019 12:58 PM    Specific gravity 1.008 07/26/2019 12:58 PM    pH (UA) 7.5 07/26/2019 12:58 PM    Protein NEGATIVE  07/26/2019 12:58 PM    Glucose NEGATIVE  07/26/2019 12:58 PM    Ketone NEGATIVE  07/26/2019 12:58 PM    Bilirubin NEGATIVE  07/26/2019 12:58 PM    Urobilinogen 1.0 07/26/2019 12:58 PM    Nitrites NEGATIVE  07/26/2019 12:58 PM    Leukocyte Esterase NEGATIVE  07/26/2019 12:58 PM         Medications Reviewed:     Current Facility-Administered Medications   Medication Dose Route Frequency    doxazosin (CARDURA) tablet 1 mg  1 mg Oral QHS    lisinopril (PRINIVIL, ZESTRIL) tablet 20 mg  20 mg Oral DAILY    [START ON 7/28/2019] potassium chloride SR (KLOR-CON 10) tablet 40 mEq  40 mEq Oral DAILY    cholecalciferol (VITAMIN D3) tablet 2,000 Units  2,000 Units Oral DAILY    diphenhydrAMINE (BENADRYL) capsule 25 mg  25 mg Oral QHS PRN    dutasteride (AVODART) capsule 0.5 mg  0.5 mg Oral DAILY    ferrous sulfate tablet 325 mg  325 mg Oral BID    levothyroxine (SYNTHROID) tablet 175 mcg  175 mcg Oral ACB    melatonin tablet 9 mg  9 mg Oral QHS    pantoprazole (PROTONIX) 40 mg in sodium chloride 0.9% 10 mL injection  40 mg IntraVENous Q12H    thiamine HCL (B-1) tablet 100 mg  100 mg Oral DAILY    sodium chloride (NS) flush 5-40 mL  5-40 mL IntraVENous Q8H    sodium chloride (NS) flush 5-40 mL  5-40 mL IntraVENous PRN    acetaminophen (TYLENOL) tablet 650 mg  650 mg Oral Q4H PRN    ondansetron (ZOFRAN) injection 4 mg  4 mg IntraVENous Q4H PRN    bisacodyl (DULCOLAX) tablet 5 mg  5 mg Oral DAILY PRN    hydrALAZINE (APRESOLINE) 20 mg/mL injection 10 mg  10 mg IntraVENous Q6H PRN    hydrALAZINE (APRESOLINE) tablet 25 mg  25 mg Oral TID    furosemide (LASIX) injection 80 mg  80 mg IntraVENous BID     ______________________________________________________________________  EXPECTED LENGTH OF STAY: 3d 7h  ACTUAL LENGTH OF STAY:          2                 Kasie Brian MD

## 2019-07-27 NOTE — PROGRESS NOTES
Bedside and Verbal shift change report given to Padmaja Carvajal (oncoming nurse) by Brianna Gomez (offgoing nurse). Report included the following information SBAR, Kardex, Intake/Output, MAR, Recent Results, Cardiac Rhythm Afib and Quality Measures.

## 2019-07-27 NOTE — PROGRESS NOTES
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 Objective:     Visit Vitals  /86 (BP 1 Location: Left arm, BP Patient Position: At rest)   Pulse (!) 58   Temp 97.3 °F (36.3 °C)   Resp 18   Ht 6' (1.829 m)   Wt 75 kg (165 lb 5.5 oz)   SpO2 95%   BMI 22.42 kg/m²        Physical Exam:  Neck: supple, symmetrical, trachea midline, no adenopathy, thyroid: not enlarged, symmetric, no tenderness/mass/nodules, no carotid bruit and no JVD  Heart: irregularly irregular rhythm, S1, S2 normal, systolic murmur: early systolic 2/6, squeaking at apex, radiates to axilla  Lungs: diminished breath sounds R base, L base  Abdomen: soft, non-tender.  Bowel sounds normal. No masses,  no organomegaly  Extremities: extremities normal, atraumatic, no cyanosis or edema  Pulses: 2+ and symmetric  Neurologic: Grossly normal      Labs:    Recent Labs     07/26/19  0603   TROIQ <0.05     Recent Labs     07/26/19  0603      K 3.4*      BUN 41*   CREA 1.35*   *   PHOS 3.4   CA 8.9     Recent Labs     07/26/19  1216 07/26/19  0603   WBC  --  4.8   HGB 8.5* 7.9*   HCT 27.6* 26.3*   PLT  --  154     Recent Labs     07/26/19  0603   CHOL 142   LDLC 56.2       Telemetry: AFIB     Data Review: current meds, labs,recent radiology, intake/output/weight and problem list reviewed

## 2019-07-27 NOTE — PROGRESS NOTES
[facility] Cardiology Progress Note    Date of service: 7/27/2019    Subjective:  Mr Denis Harper says he is feeling fine - no chest pain or shortness of breath    Objective:    Visit Vitals  /82 (BP 1 Location: Right arm, BP Patient Position: At rest)   Pulse 91   Temp 97.4 °F (36.3 °C)   Resp 18   Ht 6' (1.829 m)   Wt 71.2 kg (156 lb 15.5 oz)   SpO2 94%   BMI 21.29 kg/m²       Physical Exam   Constitutional: He is oriented to person, place, and time. He appears well-developed and well-nourished. HENT:   Head: Normocephalic and atraumatic. Eyes: Conjunctivae are normal. No scleral icterus. Neck: No JVD present. Cardiovascular: Normal rate, regular rhythm and normal heart sounds. Exam reveals no gallop. No murmur heard. Pulmonary/Chest: Effort normal and breath sounds normal. No stridor. No respiratory distress. He has no wheezes. He has no rales. Abdominal: Soft. He exhibits no distension. Musculoskeletal: He exhibits no edema or deformity. Neurological: He is alert and oriented to person, place, and time. Skin: Skin is warm and dry. Psychiatric: He has a normal mood and affect. His behavior is normal.       Data reviewed:  Recent Results (from the past 12 hour(s))   CBC WITH AUTOMATED DIFF    Collection Time: 07/27/19  3:27 AM   Result Value Ref Range    WBC 7.8 4.1 - 11.1 K/uL    RBC 3.05 (L) 4.10 - 5.70 M/uL    HGB 8.7 (L) 12.1 - 17.0 g/dL    HCT 27.9 (L) 36.6 - 50.3 %    MCV 91.5 80.0 - 99.0 FL    MCH 28.5 26.0 - 34.0 PG    MCHC 31.2 30.0 - 36.5 g/dL    RDW 16.2 (H) 11.5 - 14.5 %    PLATELET 093 933 - 137 K/uL    MPV 9.5 8.9 - 12.9 FL    NRBC 0.0 0  WBC    ABSOLUTE NRBC 0.00 0.00 - 0.01 K/uL    NEUTROPHILS 87 (H) 32 - 75 %    LYMPHOCYTES 6 (L) 12 - 49 %    MONOCYTES 5 5 - 13 %    EOSINOPHILS 2 0 - 7 %    BASOPHILS 0 0 - 1 %    IMMATURE GRANULOCYTES 1 (H) 0.0 - 0.5 %    ABS. NEUTROPHILS 6.8 1.8 - 8.0 K/UL    ABS. LYMPHOCYTES 0.5 (L) 0.8 - 3.5 K/UL    ABS.  MONOCYTES 0.4 0.0 - 1.0 K/UL ABS. EOSINOPHILS 0.1 0.0 - 0.4 K/UL    ABS. BASOPHILS 0.0 0.0 - 0.1 K/UL    ABS. IMM. GRANS. 0.0 0.00 - 0.04 K/UL    DF AUTOMATED     METABOLIC PANEL, BASIC    Collection Time: 07/27/19  3:27 AM   Result Value Ref Range    Sodium 141 136 - 145 mmol/L    Potassium 3.2 (L) 3.5 - 5.1 mmol/L    Chloride 102 97 - 108 mmol/L    CO2 32 21 - 32 mmol/L    Anion gap 7 5 - 15 mmol/L    Glucose 127 (H) 65 - 100 mg/dL    BUN 38 (H) 6 - 20 MG/DL    Creatinine 1.31 (H) 0.70 - 1.30 MG/DL    BUN/Creatinine ratio 29 (H) 12 - 20      GFR est AA >60 >60 ml/min/1.73m2    GFR est non-AA 51 (L) >60 ml/min/1.73m2    Calcium 9.6 8.5 - 10.1 MG/DL   MAGNESIUM    Collection Time: 07/27/19  3:27 AM   Result Value Ref Range    Magnesium 1.8 1.6 - 2.4 mg/dL   PHOSPHORUS    Collection Time: 07/27/19  3:27 AM   Result Value Ref Range    Phosphorus 3.5 2.6 - 4.7 MG/DL     07/25/19   ECHO ADULT COMPLETE 07/26/2019 7/26/2019    Addendum · Left Ventricle: Normal cavity size and systolic function (ejection  fraction normal). Mild concentric hypertrophy. Estimated left ventricular  ejection fraction is 56 - 60%. No regional wall motion abnormality noted. · Left Atrium: Mildly dilated left atrium. · Right Atrium: Moderately dilated right atrium. · Aortic Valve: Aortic valve sclerosis with stenosis. Aortic valve mean  gradient is 11.3 mmHg. Aortic valve area is 2 cm2. Mild aortic valve  stenosis is present. · Mitral Valve: Mitral valve thickening. Myxomatous mitral valve disease. Trace mitral valve regurgitation. · Tricuspid Valve: Moderate tricuspid valve regurgitation is present. · Pericardium: There is a large left pleural effusion. · IVC/Hepatic Veins: Severely elevated central venous pressure (15+ mmHg);  IVC diameter is larger than 21 mm and collapses less than 50% with  respiration. · Pulmonary Artery: Pulmonary hypertension.         Nazario Richardson MD 7/26/2019  2:48 PM          Narrative · Left Ventricle: Normal cavity size and systolic function (ejection   fraction normal). Mild concentric hypertrophy. Estimated left ventricular   ejection fraction is 56 - 60%. No regional wall motion abnormality noted. · Left Atrium: Mildly dilated left atrium. · Right Atrium: Moderately dilated right atrium. · Aortic Valve: Aortic valve sclerosis with stenosis. Aortic valve mean   gradient is 11.3 mmHg. Aortic valve area is 2 cm2. Mild aortic valve   stenosis is present. · Mitral Valve: Mitral valve thickening. Myxomatous mitral valve disease. Trace mitral valve regurgitation. · Tricuspid Valve: Moderate tricuspid valve regurgitation is present. · Pericardium: There is a large left pleural effusion. · IVC/Hepatic Veins: Severely elevated central venous pressure (15+ mmHg);   IVC diameter is larger than 21 mm and collapses less than 50% with   respiration. · Pulmonary Artery: Pulmonary hypertension. Signed by: Catalina Coelho MD         Assessment:  1. Acute renal failure: renal indices improving  2. Acute on chronic diastolic heart failure  3. Mild AS  4. Anemia    Plan:    IV lasix as per Nephrology direction  Holding metoalzone    Signed:  Letty Aguirre MD  Interventional Cardiology  7/27/2019

## 2019-07-28 NOTE — PROGRESS NOTES
AA&OX4. Ambulated in asif partial assistance one staff. [place] activity well. Later observed pt getting out of bed without calling nurse, forgetful in following instructions. AFib HR 45-54. //21. Sats 94-97% on room air. OK52. Breath sounds diminished claude lower lobes.

## 2019-07-28 NOTE — PROGRESS NOTES
Name: Erica Elmore   MRN: [id number]  : 1926      Assessment:  Renal failure- ? Acute vs Chronic. No prior Cr available to compare. Even if this acute, he may end up likely having CKD going forward, given he has CHF (from Cardio-renal type of clinical picture). His URIEL more likely due to recent NSAIDs use (over 2 months) + pre-renal azotemia from CHF  UA is benign    Acute on Chronic diastolic CHF- Echo with nl EF  B/L pleural effusions  Hypokalemia  HTN  Anemia with +stool occult blood- ? UGI bleed 2nd to NSAIDs use  Afib    Improved Clinically. Cr stable at 1.3 to 1.5. Low K being replaced.      Plan/Recommendations:  D/C IV Lasix (got am dose)  Start lasix 40 mg PO BID from tomm-( home dose was 80 mg every day)- titrate dose higher as outpt as needed  ct kcl to 40 meq daily  Daily wt  Ct Salt/fluid restriction  AM labs  F/u SPEP  No NSAIDS as outpt  GI on board. Ct PPI  Rate/rythm control with Afib as possible    Subjective:  oob in chair. Feels good.      ROS:   No nausea, no vomiting  No chest pain, improved  shortness of breath    Exam:  Visit Vitals  /59 (BP 1 Location: Left arm, BP Patient Position: At rest)   Pulse 62   Temp 97.3 °F (36.3 °C)   Resp 16   Ht 6' (1.829 m)   Wt 68.7 kg (151 lb 7.3 oz)   SpO2 94%   BMI 20.54 kg/m²       Elderly frail, in NAD  No icterus, mmm  Clear with dec BS at bases  RRR, +SM  No edema  Alert awake    Current Facility-Administered Medications   Medication Dose Route Frequency Last Dose    doxazosin (CARDURA) tablet 1 mg  1 mg Oral QHS 1 mg at 19 2218    lisinopril (PRINIVIL, ZESTRIL) tablet 20 mg  20 mg Oral DAILY 20 mg at 19 0838    potassium chloride SR (KLOR-CON 10) tablet 40 mEq  40 mEq Oral DAILY 40 mEq at 19 0837    cholecalciferol (VITAMIN D3) tablet 2,000 Units  2,000 Units Oral DAILY 2,000 Units at 07/28/19 0837    diphenhydrAMINE (BENADRYL) capsule 25 mg  25 mg Oral QHS PRN      dutasteride (AVODART) capsule 0.5 mg  0.5 mg Oral DAILY 0.5 mg at 07/28/19 7998    ferrous sulfate tablet 325 mg  325 mg Oral  mg at 07/28/19 0836    levothyroxine (SYNTHROID) tablet 175 mcg  175 mcg Oral  mcg at 07/28/19 0730    melatonin tablet 9 mg  9 mg Oral QHS 9 mg at 07/27/19 2217    pantoprazole (PROTONIX) 40 mg in sodium chloride 0.9% 10 mL injection  40 mg IntraVENous Q12H 40 mg at 07/28/19 0339    thiamine HCL (B-1) tablet 100 mg  100 mg Oral DAILY 100 mg at 07/28/19 0836    sodium chloride (NS) flush 5-40 mL  5-40 mL IntraVENous Q8H 5 mL at 07/28/19 0600    sodium chloride (NS) flush 5-40 mL  5-40 mL IntraVENous PRN      acetaminophen (TYLENOL) tablet 650 mg  650 mg Oral Q4H PRN      ondansetron (ZOFRAN) injection 4 mg  4 mg IntraVENous Q4H PRN      bisacodyl (DULCOLAX) tablet 5 mg  5 mg Oral DAILY PRN      hydrALAZINE (APRESOLINE) 20 mg/mL injection 10 mg  10 mg IntraVENous Q6H PRN 10 mg at 07/26/19 0514    hydrALAZINE (APRESOLINE) tablet 25 mg  25 mg Oral TID 25 mg at 07/28/19 9464       Labs/Data:    Lab Results   Component Value Date/Time    WBC 6.3 07/28/2019 03:43 AM    HGB 8.6 (L) 07/28/2019 03:43 AM    HCT 27.6 (L) 07/28/2019 03:43 AM    PLATELET 738 85/89/1401 03:43 AM    MCV 90.5 07/28/2019 03:43 AM       Lab Results   Component Value Date/Time    Sodium 139 07/28/2019 03:43 AM    Potassium 3.2 (L) 07/28/2019 03:43 AM    Chloride 100 07/28/2019 03:43 AM    CO2 31 07/28/2019 03:43 AM    Anion gap 8 07/28/2019 03:43 AM    Glucose 126 (H) 07/28/2019 03:43 AM    BUN 43 (H) 07/28/2019 03:43 AM    Creatinine 1.51 (H) 07/28/2019 03:43 AM    BUN/Creatinine ratio 28 (H) 07/28/2019 03:43 AM    GFR est AA 53 (L) 07/28/2019 03:43 AM    GFR est non-AA 43 (L) 07/28/2019 03:43 AM    Calcium 9.0 07/28/2019 03:43 AM       Wt Readings from Last 3 Encounters:   07/28/19 68.7 kg (151 lb 7.3 oz) 07/15/13 91.4 kg (201 lb 8 oz)   07/02/13 85.3 kg (188 lb)         Intake/Output Summary (Last 24 hours) at 7/28/2019 1433  Last data filed at 7/27/2019 1730  Gross per 24 hour   Intake    Output 315 ml   Net -315 ml       Patient seen and examined. Chart reviewed. Labs, data and other pertinent notes reviewed in last 24 hrs.     PMH/SH/FH reviewed and unchanged compared to H&P    Discussed with pt       Chris Vazquez MD

## 2019-07-28 NOTE — PROGRESS NOTES
[facility] Cardiology Progress Note    Date of service: 7/28/2019    Subjective:  Mr Gabriel Manzo says he is feeling fine - no chest pain or shortness of breath    Objective:    Visit Vitals  /79 (BP 1 Location: Left arm, BP Patient Position: At rest)   Pulse 60   Temp 97.2 °F (36.2 °C)   Resp 16   Ht 6' (1.829 m)   Wt 68.7 kg (151 lb 7.3 oz)   SpO2 97%   BMI 20.54 kg/m²       Physical Exam   Constitutional: He is oriented to person, place, and time. He appears well-developed and well-nourished. HENT:   Head: Normocephalic and atraumatic. Eyes: Conjunctivae are normal. No scleral icterus. Neck: No JVD present. Cardiovascular: Normal rate, regular rhythm and normal heart sounds. Exam reveals no gallop. No murmur heard. Pulmonary/Chest: Effort normal and breath sounds normal. No stridor. No respiratory distress. He has no wheezes. He has no rales. Abdominal: Soft. He exhibits no distension. Musculoskeletal: He exhibits no edema or deformity. Neurological: He is alert and oriented to person, place, and time. Skin: Skin is warm and dry. Psychiatric: He has a normal mood and affect. His behavior is normal.       Data reviewed:  Recent Results (from the past 12 hour(s))   CBC WITH AUTOMATED DIFF    Collection Time: 07/28/19  3:43 AM   Result Value Ref Range    WBC 6.3 4.1 - 11.1 K/uL    RBC 3.05 (L) 4.10 - 5.70 M/uL    HGB 8.6 (L) 12.1 - 17.0 g/dL    HCT 27.6 (L) 36.6 - 50.3 %    MCV 90.5 80.0 - 99.0 FL    MCH 28.2 26.0 - 34.0 PG    MCHC 31.2 30.0 - 36.5 g/dL    RDW 16.0 (H) 11.5 - 14.5 %    PLATELET 142 685 - 574 K/uL    MPV 9.3 8.9 - 12.9 FL    NRBC 0.0 0  WBC    ABSOLUTE NRBC 0.00 0.00 - 0.01 K/uL    NEUTROPHILS 79 (H) 32 - 75 %    LYMPHOCYTES 10 (L) 12 - 49 %    MONOCYTES 6 5 - 13 %    EOSINOPHILS 4 0 - 7 %    BASOPHILS 0 0 - 1 %    IMMATURE GRANULOCYTES 0 0.0 - 0.5 %    ABS. NEUTROPHILS 5.0 1.8 - 8.0 K/UL    ABS. LYMPHOCYTES 0.7 (L) 0.8 - 3.5 K/UL    ABS. MONOCYTES 0.4 0.0 - 1.0 K/UL    ABS. EOSINOPHILS 0.2 0.0 - 0.4 K/UL    ABS. BASOPHILS 0.0 0.0 - 0.1 K/UL    ABS. IMM. GRANS. 0.0 0.00 - 0.04 K/UL    DF AUTOMATED     METABOLIC PANEL, BASIC    Collection Time: 07/28/19  3:43 AM   Result Value Ref Range    Sodium 139 136 - 145 mmol/L    Potassium 3.2 (L) 3.5 - 5.1 mmol/L    Chloride 100 97 - 108 mmol/L    CO2 31 21 - 32 mmol/L    Anion gap 8 5 - 15 mmol/L    Glucose 126 (H) 65 - 100 mg/dL    BUN 43 (H) 6 - 20 MG/DL    Creatinine 1.51 (H) 0.70 - 1.30 MG/DL    BUN/Creatinine ratio 28 (H) 12 - 20      GFR est AA 53 (L) >60 ml/min/1.73m2    GFR est non-AA 43 (L) >60 ml/min/1.73m2    Calcium 9.0 8.5 - 10.1 MG/DL     07/25/19   ECHO ADULT COMPLETE 07/26/2019 7/26/2019    Addendum · Left Ventricle: Normal cavity size and systolic function (ejection  fraction normal). Mild concentric hypertrophy. Estimated left ventricular  ejection fraction is 56 - 60%. No regional wall motion abnormality noted. · Left Atrium: Mildly dilated left atrium. · Right Atrium: Moderately dilated right atrium. · Aortic Valve: Aortic valve sclerosis with stenosis. Aortic valve mean  gradient is 11.3 mmHg. Aortic valve area is 2 cm2. Mild aortic valve  stenosis is present. · Mitral Valve: Mitral valve thickening. Myxomatous mitral valve disease. Trace mitral valve regurgitation. · Tricuspid Valve: Moderate tricuspid valve regurgitation is present. · Pericardium: There is a large left pleural effusion. · IVC/Hepatic Veins: Severely elevated central venous pressure (15+ mmHg);  IVC diameter is larger than 21 mm and collapses less than 50% with  respiration. · Pulmonary Artery: Pulmonary hypertension. Vinicius Poe MD 7/26/2019  2:48 PM          Narrative · Left Ventricle: Normal cavity size and systolic function (ejection   fraction normal). Mild concentric hypertrophy. Estimated left ventricular   ejection fraction is 56 - 60%. No regional wall motion abnormality noted. · Left Atrium: Mildly dilated left atrium.   · Right Atrium: Moderately dilated right atrium. · Aortic Valve: Aortic valve sclerosis with stenosis. Aortic valve mean   gradient is 11.3 mmHg. Aortic valve area is 2 cm2. Mild aortic valve   stenosis is present. · Mitral Valve: Mitral valve thickening. Myxomatous mitral valve disease. Trace mitral valve regurgitation. · Tricuspid Valve: Moderate tricuspid valve regurgitation is present. · Pericardium: There is a large left pleural effusion. · IVC/Hepatic Veins: Severely elevated central venous pressure (15+ mmHg);   IVC diameter is larger than 21 mm and collapses less than 50% with   respiration. · Pulmonary Artery: Pulmonary hypertension. Signed by: Joel Mariano MD         Assessment:  1. Acute renal failure: renal indices stable  2. Acute on chronic diastolic heart failure: stable  3. Mild AS  4. Anemia    Plan:    Still on IV lasix but I think he can probably switch back to PO now - will defer to Nephrology    Hopefully ready for discharge soon. Signed:  Yair Anderson MD  Interventional Cardiology  7/28/2019

## 2019-07-28 NOTE — PROGRESS NOTES
Patient will not void in urinal for accurate output. Intake documented. Patient walking halls with nurse several time this shift. Confused regarding situation, but easily redirected. Bedside verbal shift change report given to oncoming nurse Marilyn Ramirez R.N. Opportunity for questions and clarifications provided.

## 2019-07-29 NOTE — PROGRESS NOTES
Name: Brandon Rivero   MRN: [id number]  : 1926      Assessment:  Renal failure- Cr rising with needed diuresis-> up to 1.7mg/dl. Acute vs Chronic CKD? No prior Cr available to compare. Even if this acute, he may end up likely having CKD going forward, given he has CHF (from Cardio-renal type of clinical picture). His URIEL more likely due to recent NSAIDs use (over 2 months) + pre-renal azotemia from CHF  UA is benign    Acute on Chronic diastolic CHF- Echo with nl EF  B/L pleural effusions  Hypokalemia  HTN  Anemia with +stool occult blood- ? UGI bleed 2nd to NSAIDs use  Afib     Plan/Recommendations:  Lasix 40 mg PO BID   Oral KCl 40 meq daily  May have to hold Lisinopril if Cr continues to rise. Daily wt  Ct Salt/fluid restriction  AM labs  F/u SPEP  No NSAIDS as outpt        Subjective:  OOB in chair. Feels good.  Wants to go home    ROS:   No nausea, no vomiting  No chest pain, improved shortness of breath    Exam:  Visit Vitals  /64 (BP 1 Location: Left arm, BP Patient Position: At rest;Sitting)   Pulse 63   Temp 97.4 °F (36.3 °C)   Resp 18   Ht 6' (1.829 m)   Wt 69.6 kg (153 lb 7 oz)   SpO2 97%   BMI 20.81 kg/m²       Elderly frail, in NAD  No icterus, mmm  Clear with dec BS at bases  RRR, +SM  No edema  Alert awake    Current Facility-Administered Medications   Medication Dose Route Frequency Last Dose    furosemide (LASIX) tablet 40 mg  40 mg Oral BID 40 mg at 19 0911    doxazosin (CARDURA) tablet 1 mg  1 mg Oral QHS 1 mg at 19    lisinopril (PRINIVIL, ZESTRIL) tablet 20 mg  20 mg Oral DAILY 20 mg at 19 0911    potassium chloride SR (KLOR-CON 10) tablet 40 mEq  40 mEq Oral DAILY 40 mEq at 19 0910    cholecalciferol (VITAMIN D3) tablet 2,000 Units  2,000 Units Oral DAILY 2,000 Units at 19 0911    diphenhydrAMINE (BENADRYL) capsule 25 mg  25 mg Oral QHS PRN      dutasteride (AVODART) capsule 0.5 mg  0.5 mg Oral DAILY 0.5 mg at 07/29/19 0911    ferrous sulfate tablet 325 mg  325 mg Oral  mg at 07/29/19 0911    levothyroxine (SYNTHROID) tablet 175 mcg  175 mcg Oral  mcg at 07/29/19 0721    melatonin tablet 9 mg  9 mg Oral QHS 9 mg at 07/28/19 2153    pantoprazole (PROTONIX) 40 mg in sodium chloride 0.9% 10 mL injection  40 mg IntraVENous Q12H 40 mg at 07/29/19 0610    thiamine HCL (B-1) tablet 100 mg  100 mg Oral DAILY 100 mg at 07/29/19 0911    sodium chloride (NS) flush 5-40 mL  5-40 mL IntraVENous Q8H 5 mL at 07/29/19 0600    sodium chloride (NS) flush 5-40 mL  5-40 mL IntraVENous PRN      acetaminophen (TYLENOL) tablet 650 mg  650 mg Oral Q4H PRN      ondansetron (ZOFRAN) injection 4 mg  4 mg IntraVENous Q4H PRN      bisacodyl (DULCOLAX) tablet 5 mg  5 mg Oral DAILY PRN      hydrALAZINE (APRESOLINE) 20 mg/mL injection 10 mg  10 mg IntraVENous Q6H PRN 10 mg at 07/26/19 0514    hydrALAZINE (APRESOLINE) tablet 25 mg  25 mg Oral TID 25 mg at 07/29/19 0911       Labs/Data:    Lab Results   Component Value Date/Time    WBC 12.3 (H) 07/29/2019 03:47 AM    HGB 8.4 (L) 07/29/2019 03:47 AM    HCT 26.9 (L) 07/29/2019 03:47 AM    PLATELET 807 62/49/0459 03:47 AM    MCV 90.6 07/29/2019 03:47 AM       Lab Results   Component Value Date/Time    Sodium 136 07/29/2019 03:47 AM    Potassium 3.4 (L) 07/29/2019 03:47 AM    Chloride 101 07/29/2019 03:47 AM    CO2 30 07/29/2019 03:47 AM    Anion gap 5 07/29/2019 03:47 AM    Glucose 142 (H) 07/29/2019 03:47 AM    BUN 57 (H) 07/29/2019 03:47 AM    Creatinine 1.77 (H) 07/29/2019 03:47 AM    BUN/Creatinine ratio 32 (H) 07/29/2019 03:47 AM    GFR est AA 44 (L) 07/29/2019 03:47 AM    GFR est non-AA 36 (L) 07/29/2019 03:47 AM    Calcium 8.9 07/29/2019 03:47 AM       Wt Readings from Last 3 Encounters:   07/29/19 69.6 kg (153 lb 7 oz)   07/15/13 91.4 kg (201 lb 8 oz) 07/02/13 85.3 kg (188 lb)       No intake or output data in the 24 hours ending 07/29/19 1459    Patient seen and examined. Chart reviewed. Labs, data and other pertinent notes reviewed in last 24 hrs.     PMH/SH/FH reviewed and unchanged compared to H&P    Discussed with pt /family and RN      Merrill Salter MD   NSP[facility]

## 2019-07-29 NOTE — PROGRESS NOTES
Problem: Dysphagia (Adult)  Goal: *Acute Goals and Plan of Care (Insert Text)  Description  Speech pathology goals  Initiated 7/29/2019  1. Patient will tolerate regular/nectar liquid diet with no overt s/s aspiration within 7 days  2. Patient will participate in [address] as clinically indicated   Outcome: Progressing Towards Goal     SPEECH LANGUAGE PATHOLOGY BEDSIDE SWALLOW EVALUATION  Patient: Brandon Rivero (60 y.o. male)  Date: 7/29/2019  Primary Diagnosis: Acute on chronic systolic CHF (congestive heart failure) ([facility]) [I50.23]        Precautions: swallow       ASSESSMENT :  Based on the objective data described below, the patient presents with suspected moderate pharyngeal dysphagia characterized by delayed swallow initiation, decreased laryngeal elevation, and intermittent 2-3 swallows which could indicate pharyngeal residue. Patient with strong coughing consistently with thin liquids that was not observed with nectar thick liquids. Intermittent throat clearing noted throughout session. Patient is at risk for aspiration secondary to pharyngeal dysphagia, cognitive status, and respiratory status. Patient will benefit from skilled intervention to address the above impairments. Patients rehabilitation potential is considered to be Fair  Factors which may influence rehabilitation potential include:   ? None noted  ? Mental ability/status  ? Medical condition  ? Home/family situation and support systems  ? Safety awareness  ? Pain tolerance/management  ? Other:      PLAN :  Recommendations and Planned Interventions:  --Regular/NECTAR liquid diet  --No straws  --Meds as tolerated  --SLP to follow for diet tolerance, liberalization, and MBS as indicated  Frequency/Duration: Patient will be followed by speech-language pathology 3 times a week to address goals. Discharge Recommendations:  To Be Determined     SUBJECTIVE:   Patient stated I need some water.  Patient alert, cooperative, confused. Oriented to person, place, situation, and month; disoriented to year. 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 ..ramps,elevators)  # of Interior Steps: (none)  Height of Each Step (in): (none)  Interior Rails: None  Lift Chair Available: No  Living Alone: No  Support Systems: Assisted living, Child(jessica), Family member(s), Spouse/Significant Other/Partner  Patient Expects to be Discharged to[de-identified] Assisted living  Current DME Used/Available at Home: None  Diet prior to admission: regular/thin per chart review  Current Diet:  regular/thin   Cognitive and Communication Status:  Neurologic State: Alert  Orientation Level: Oriented to person, Oriented to place, Oriented to situation, Disoriented to time, Other (Comment)(oriented to month, not year)  Cognition: Follows commands           Oral Assessment:  Oral Assessment  Labial: No impairment  Dentition: Natural;Full  Oral Hygiene: moist oral mucosa  Lingual: No impairment  Velum: No impairment  Mandible: No impairment  P.O. Trials:  Patient Position: sitting in chair  Vocal quality prior to P.O.: No impairment  Consistency Presented: Thin liquid;Puree; Solid; Nectar thick liquid  How Presented: Self-fed/presented;Cup/sip;Spoon;Straw     Bolus Acceptance: No impairment  Bolus Formation/Control: No impairment     Propulsion: No impairment  Oral Residue: None  Initiation of Swallow: Delayed (# of seconds)  Laryngeal Elevation: Decreased  Aspiration Signs/Symptoms: Strong cough;Clear throat; Other (comment)(cough consistently with thin, throat clear throughout)  Pharyngeal Phase Characteristics: Multiple swallows; Suspected pharyngeal residue  Effective Modifications: Alternate liquids/solids  Cues for Modifications: None       Oral Phase Severity: Other (comment)(WFL)  Pharyngeal Phase Severity : Moderate    NOMS:   The NOMS functional outcome measure was used to quantify this patient's level of swallowing impairment. Based on the NOMS, the patient was determined to be at level 5 for swallow function       NOMS Swallowing Levels:  Level 1 (CN): NPO  Level 2 (CM): NPO but takes consistency in therapy  Level 3 (CL): Takes less than 50% of nutrition p.o. and continues with nonoral feedings; and/or safe with mod cues; and/or max diet restriction  Level 4 (CK): Safe swallow but needs mod cues; and/or mod diet restriction; and/or still requires some nonoral feeding/supplements  Level 5 (CJ): Safe swallow with min diet restriction; and/or needs min cues  Level 6 (CI): Independent with p.o.; rare cues; usually self cues; may need to avoid some foods or needs extra time  Level 7 ([address] [place] [address]): Independent for all p.o.  LOUISE. (2003). National Outcomes Measurement System (NOMS): Adult Speech-Language Pathology User's Guide. Pain:  Pain Scale 1: Numeric (0 - 10)  Pain Intensity 1: 0     After treatment:   ?            Patient left in no apparent distress sitting up in chair  ? Patient left in no apparent distress in bed  ? Call bell left within reach  ? Nursing notified  ? Caregiver present  ? Bed alarm activated    COMMUNICATION/EDUCATION:   The patients plan of care including recommendations, planned interventions, and recommended diet changes were discussed with: Registered Nurse. ? Posted safety precautions in patient's room. ? Patient/family have participated as able in goal setting and plan of care. ?            Patient/family agree to work toward stated goals and plan of care. ?            Patient understands intent and goals of therapy, but is neutral about his/her participation. ? Patient is unable to participate in goal setting and plan of care.     Thank you for this referral.  Keven Dhaliwal, SLP  Time Calculation: 20 mins

## 2019-07-29 NOTE — PROGRESS NOTES
SOPHIA:  1. Per Bennet Hodgkin, phone [phone] SW at [address] is confirmed for pt to return to [address] [place] [address] at discharge. She will need to be notified of discharge. 2.  Nurse will call report to [phone].  3.  Clinicals will need to be faxed to [phone]  4. Per attending, plan is for EGD tomorrow. OTILIO Whiting          Reason for Admission:    Acute CHF                  RRAT Score:     16           Do you (patient/family) have any concerns for transition/discharge? CM met with pt at the bedside and he didn't indicate any concerns for discharge. He advised CM that he currently is at [address] [place] [address] SNF and plans to return there at discharge. Plan for utilizing home health:   none    Current Advanced Directive/Advance Care Plan:  Not on file but pt confirmed emergency contact is his wife Song Hinton            Transition of Care Plan:  Return to [facility]. CM called [facility] [email or web address]],  phone [phone] to advise of pt's admission and need to return to healthcare at discharge. Bennet Hodgkin is covering, phone [phone]. CM left message to coordinate discharge needs.    Radha French MSW

## 2019-07-29 NOTE — PROGRESS NOTES
Randi [address]  [facility], [address]       GI PROGRESS NOTE  Tara Donald, [facility] office  [phone] NP in-hospital cell phone M-F until 4:30  After 5pm or on weekends, please call  for physician on call      NAME: Nirali Reyes   :  1926   MRN:  [id number]       Subjective:   He denies abdominal pain or nausea, tolerating diet. He reports melena this morning, but says that it's \"trying to brown up\". Objective:     VITALS:   Last 24hrs VS reviewed since prior progress note. Most recent are:  Visit Vitals  /68 (BP 1 Location: Left arm, BP Patient Position: At rest)   Pulse 62   Temp 97.9 °F (36.6 °C)   Resp 18   Ht 6' (1.829 m)   Wt 69.6 kg (153 lb 7 oz)   SpO2 96%   BMI 20.81 kg/m²       PHYSICAL EXAM:  General: Cooperative, no acute distress    Neurologic:  Alert and oriented   HEENT: EOMI, no scleral icterus   Lungs:  CTA bilaterally. No wheezing  Heart:  S1 S2   Abdomen: Soft, non-distended, no tenderness. +Bowel sounds  Extremities: warm  Psych:   Fair insight. Not anxious or agitated. Lab Data Reviewed:     Recent Results (from the past 24 hour(s))   CBC WITH AUTOMATED DIFF    Collection Time: 19  3:47 AM   Result Value Ref Range    WBC 12.3 (H) 4.1 - 11.1 K/uL    RBC 2.97 (L) 4.10 - 5.70 M/uL    HGB 8.4 (L) 12.1 - 17.0 g/dL    HCT 26.9 (L) 36.6 - 50.3 %    MCV 90.6 80.0 - 99.0 FL    MCH 28.3 26.0 - 34.0 PG    MCHC 31.2 30.0 - 36.5 g/dL    RDW 16.1 (H) 11.5 - 14.5 %    PLATELET 187 494 - 230 K/uL    MPV 9.5 8.9 - 12.9 FL    NRBC 0.0 0  WBC    ABSOLUTE NRBC 0.00 0.00 - 0.01 K/uL    NEUTROPHILS 90 (H) 32 - 75 %    LYMPHOCYTES 5 (L) 12 - 49 %    MONOCYTES 3 (L) 5 - 13 %    EOSINOPHILS 1 0 - 7 %    BASOPHILS 0 0 - 1 %    IMMATURE GRANULOCYTES 1 (H) 0.0 - 0.5 %    ABS. NEUTROPHILS 11.1 (H) 1.8 - 8.0 K/UL    ABS. LYMPHOCYTES 0.6 (L) 0.8 - 3.5 K/UL    ABS. MONOCYTES 0.4 0.0 - 1.0 K/UL    ABS.  EOSINOPHILS 0.1 0.0 - 0.4 K/UL ABS. BASOPHILS 0.0 0.0 - 0.1 K/UL    ABS. IMM. GRANS. 0.1 (H) 0.00 - 0.04 K/UL    DF SMEAR SCANNED      RBC COMMENTS ANISOCYTOSIS  1+        RBC COMMENTS POLYCHROMASIA  PRESENT       METABOLIC PANEL, BASIC    Collection Time: 07/29/19  3:47 AM   Result Value Ref Range    Sodium 136 136 - 145 mmol/L    Potassium 3.4 (L) 3.5 - 5.1 mmol/L    Chloride 101 97 - 108 mmol/L    CO2 30 21 - 32 mmol/L    Anion gap 5 5 - 15 mmol/L    Glucose 142 (H) 65 - 100 mg/dL    BUN 57 (H) 6 - 20 MG/DL    Creatinine 1.77 (H) 0.70 - 1.30 MG/DL    BUN/Creatinine ratio 32 (H) 12 - 20      GFR est AA 44 (L) >60 ml/min/1.73m2    GFR est non-AA 36 (L) >60 ml/min/1.73m2    Calcium 8.9 8.5 - 10.1 MG/DL            Assessment:   · Anemia with hemoccult positive stool: Hgb 8.4, platelets 966. Iron low, ferritin normal. Eliquis on hold. On iron. History of NSAID use. · Acute on chronic systolic congestive heart failure: cardiology following  · Pleural effusions   · Atrial fibrillation: on Eliquis (on hold). · Acute vs chronic kidney failure: nephrology following  · Hypertension  · Hypothyroidism     Patient Active Problem List   Diagnosis Code    Colon cancer ([facility] [address] C18.9    Acute on chronic systolic CHF (congestive heart failure) ([facility] [facility] I50.23     Plan:   · Discussed with daughter Bonnee Goldmann on phone per patient request she seems to favor EGD but will discuss with her sister, reviewed risks as well as stable hemoglobin, patient seems indifferent   · Please keep NPO at midnight, if family wants EGD please obtain consent   · BID PPI  · Oral iron supplementation  · Monitor CBC  · Avoid NSAID's  · Cardiology and nephrology following     Signed By: Dae Lugo NP     7/29/2019  12:25 PM       GI Attending: Agree with plan as above. If EGD is within goals of care, we can proceed with procedure. Will follow along with you. Please call with any questions. Richard Carias MD

## 2019-07-29 NOTE — PROGRESS NOTES
Hospitalist Progress Note  Sary Bermudez MD  Answering service: [phone] OR [phone] from in house phone        Date of Service:  2019  NAME:  Marie Angel  :  1926  MRN:  [id number]      Admission Summary:    43-year-old man with past medical history significant for hypertension, hypothyroidism, chronic atrial fibrillation, colon cancer; status post colon resection, skin cancer, and chronic kidney disease was in his usual state of health until a few weeks ago when the patient developed shortness of breath. Interval history / Subjective:     Family at bedside -patient states that his breathing has improved. Assessment & Plan:     #Acute on chronic diastolic heart failure:NYHA class 4 at presentation  #Biltaeral pleural effusion:  CT chest showed bilateral moderate to large pleural effusions with left lower lobe atelectasis. -echo shows concentric hypertrophy of the left ventricle  -Diuresed  well on IV diuretics  -CXR still has b/l pleural effusions but patient's symptoms have improved. -strict I and O  -daily weights. #Renal failure- acute vs chronic:  Acute - cardio renal vs use of NSAIDs, may be he also has some degree of CKD. -last known cr is 0.83 in 2013  -cr stable  -Nephrology following    #Atrial fib :  -hold eliquis for now due to anemia-concern for GI bleed  -EKG shows junctional rythym -discontinued verapamil -HR better now. #Anemia:  -Hb 7.9 at admission,unknown base line  -stool occult positive  -was taking NSAIDs PTA and on eliquis  -Hb stable 8-8.5  -Q 12 hr H and H  -GI following -probably will need EGD once resp issues better as we need to decide if anticoagulation can be resumed.  -will discuss with GI team tomorrow about endoscopy. #HTN:  -BP stable. Continue amlodipine,lisnipril 20 and cardura 1mg.     #Hypothyroiism:  -synthroid    Code status: DNR  DVT prophylaxis: SCD    Care Plan discussed with: patient,fdamily at bed side  Disposition:TBD     Hospital Problems  Date Reviewed: 7/25/2019          Codes Class Noted POA    * (Principal) Acute on chronic systolic CHF (congestive heart failure) (HCC) ICD-10-CM: I50.23  ICD-9-CM: 428.23, 428.0  7/25/2019 Yes                Review of Systems:   As per HPI      Vital Signs:    Last 24hrs VS reviewed since prior progress note. Most recent are:  Visit Vitals  /72 (BP 1 Location: Left arm)   Pulse 64   Temp 98 °F (36.7 °C)   Resp 17   Ht 6' (1.829 m)   Wt 68.7 kg (151 lb 7.3 oz)   SpO2 91%   BMI 20.54 kg/m²       No intake or output data in the 24 hours ending 07/28/19 2042     Physical Examination:             Constitutional:  No acute distress   ENT:  Oral mucous moist, oropharynx benign. Resp:  b/l decreased breath sounds at bases,    CV:  Regular rhythm, normal heart rate    GI:  Soft, non distended, non tender. normoactive bowel sounds    Musculoskeletal:   LE edema improving    Neurologic:  Moves all extremities. AAOx3, occasional episodes of confusion     Psych:   Not anxious nor agitated. Data Review:    Review and/or order of clinical lab test,imaging  Review and/or order of tests in the medicine section of CPT      Labs:     Recent Labs     07/28/19  0343 07/27/19  0327   WBC 6.3 7.8   HGB 8.6* 8.7*   HCT 27.6* 27.9*    159     Recent Labs     07/28/19  0343 07/27/19  0327 07/26/19  0603    141 141   K 3.2* 3.2* 3.4*    102 105   CO2 31 32 30   BUN 43* 38* 41*   CREA 1.51* 1.31* 1.35*   * 127* 116*   CA 9.0 9.6 8.9   MG  --  1.8 1.9   PHOS  --  3.5 3.4     Recent Labs     07/26/19  0603   SGOT 37   ALT 33   AP 69   TBILI 0.5   TP 6.4   ALB 3.2*   GLOB 3.2     No results for input(s): INR, PTP, APTT in the last 72 hours.     No lab exists for component: Gentry Lujan   Recent Labs     07/26/19  0603   FERR 132      Lab Results   Component Value Date/Time    Folate 9.8 07/26/2019 06:03 AM      No results for input(s): PH, PCO2, PO2 in the last 72 hours.   Recent Labs     07/26/19  0603 07/26/19  0050   TROIQ <0.05 <0.05     Lab Results   Component Value Date/Time    Cholesterol, total 142 07/26/2019 06:03 AM    HDL Cholesterol 79 07/26/2019 06:03 AM    LDL, calculated 56.2 07/26/2019 06:03 AM    Triglyceride 34 07/26/2019 06:03 AM    CHOL/HDL Ratio 1.8 07/26/2019 06:03 AM     Lab Results   Component Value Date/Time    Glucose (POC) 148 (H) 07/11/2013 09:36 PM     Lab Results   Component Value Date/Time    Color YELLOW/STRAW 07/26/2019 12:58 PM    Appearance CLEAR 07/26/2019 12:58 PM    Specific gravity 1.008 07/26/2019 12:58 PM    pH (UA) 7.5 07/26/2019 12:58 PM    Protein NEGATIVE  07/26/2019 12:58 PM    Glucose NEGATIVE  07/26/2019 12:58 PM    Ketone NEGATIVE  07/26/2019 12:58 PM    Bilirubin NEGATIVE  07/26/2019 12:58 PM    Urobilinogen 1.0 07/26/2019 12:58 PM    Nitrites NEGATIVE  07/26/2019 12:58 PM    Leukocyte Esterase NEGATIVE  07/26/2019 12:58 PM         Medications Reviewed:     Current Facility-Administered Medications   Medication Dose Route Frequency    furosemide (LASIX) tablet 40 mg  40 mg Oral BID    doxazosin (CARDURA) tablet 1 mg  1 mg Oral QHS    lisinopril (PRINIVIL, ZESTRIL) tablet 20 mg  20 mg Oral DAILY    potassium chloride SR (KLOR-CON 10) tablet 40 mEq  40 mEq Oral DAILY    cholecalciferol (VITAMIN D3) tablet 2,000 Units  2,000 Units Oral DAILY    diphenhydrAMINE (BENADRYL) capsule 25 mg  25 mg Oral QHS PRN    dutasteride (AVODART) capsule 0.5 mg  0.5 mg Oral DAILY    ferrous sulfate tablet 325 mg  325 mg Oral BID    levothyroxine (SYNTHROID) tablet 175 mcg  175 mcg Oral ACB    melatonin tablet 9 mg  9 mg Oral QHS    pantoprazole (PROTONIX) 40 mg in sodium chloride 0.9% 10 mL injection  40 mg IntraVENous Q12H    thiamine HCL (B-1) tablet 100 mg  100 mg Oral DAILY    sodium chloride (NS) flush 5-40 mL  5-40 mL IntraVENous Q8H    sodium chloride (NS) flush 5-40 mL  5-40 mL IntraVENous PRN    acetaminophen (TYLENOL) tablet 650 mg  650 mg Oral Q4H PRN    ondansetron (ZOFRAN) injection 4 mg  4 mg IntraVENous Q4H PRN    bisacodyl (DULCOLAX) tablet 5 mg  5 mg Oral DAILY PRN    hydrALAZINE (APRESOLINE) 20 mg/mL injection 10 mg  10 mg IntraVENous Q6H PRN    hydrALAZINE (APRESOLINE) tablet 25 mg  25 mg Oral TID     ______________________________________________________________________  EXPECTED LENGTH OF STAY: 3d 7h  ACTUAL LENGTH OF STAY:          3                 Tushar Floyd MD

## 2019-07-30 NOTE — ROUTINE PROCESS
[address] Daughter requested to speak with cardiologist regarding EGD. Spoke to cardiologist, Dr. Mushtaq Neely, who said he will come this afternoon to speak with the family. Informed the family of this. [address] Daughter Tresa Howell notified nurse that patient and family are in agreement  to perform EGD without talking to the cardiologist prior to the procedure. Paged GI to inform of patient's decision. Awaiting return call.

## 2019-07-30 NOTE — PROCEDURES
[address]  [facility] [address] ([facility] Procedure Note    Lexus Flood  1/5/1926  [id number]      Procedure: Endoscopic Gastroduodenoscopy --diagnostic    Indication: anemia, occult positive stool    Pre-operative Diagnosis: see indication above    Post-operative Diagnosis: see findings below    : Jd Leach. Kristal Og MD    Referring Provider:  Sakshi Dee DO      Anesthesia/Sedation:  MAC anesthesia Propofol        Procedure Details     After informed consent was obtained for the procedure, with all risks and benefits of procedure explained the patient was taken to the endoscopy suite and placed in the left lateral decubitus position. Following sequential administration of sedation as per above, the endoscope was inserted into the mouth and advanced under direct vision to second portion of the duodenum. A careful inspection was made as the gastroscope was withdrawn, including a retroflexed view of the proximal stomach; findings and interventions are described below. Findings:   Esophagus:normal  Stomach: there were multiple linear erosions along the folds of the proximal stomach. These were endoscopically consistent with [place] Bolls erosions likely secondary to a sliding hiatal hernia. Duodenum: normal to second portion      Therapies: as above    Specimens: none         EBL: None      Complications:   None; patient tolerated the procedure well. Impression:    1. Hiatal hernia with [place] Bolls erosions    Recommendations:  1. Advance diet as tolerated  2. Continue PPI  3. Monitor CBC  4. Okay to restart Eliquis from GI perspective. If he has recurrent anemia, we can consider M2A capsule endoscopy. Alternatively, PRBC transfusions can be considered. 5. Will follow    Signed By: Jd Leach.  Kristal Og MD     7/30/2019  2:44 PM

## 2019-07-30 NOTE — PROGRESS NOTES
Name: Maia Herman   MRN: [id number]  : 1926      Assessment:  Renal failure- Cr rising with needed diuresis-> up to 1.7mg/dl yesterday-> no new labs drawn today. Acute vs Chronic CKD? No prior Cr available to compare. Even if this acute, he may end up likely having CKD going forward, given he has CHF (from Cardio-renal type of clinical picture). His URIEL more likely due to recent NSAIDs use (over 2 months) + pre-renal azotemia from CHF  UA is benign    Acute on Chronic diastolic CHF- Echo with nl EF  B/L pleural effusions  Hypokalemia  HTN  Anemia with +stool occult blood- ? UGI bleed 2nd to NSAIDs use. EGD today c/w Hiatal hernia with [facility], [facility] erosions  Afib     Plan/Recommendations:  Repeat CMP now  Lasix 40 mg PO BID   Oral KCl   May have to hold Lisinopril if Cr continues to rise. Daily wt  Ct Salt/fluid restriction  AM labs  No NSAIDS as outpt      Subjective:  Family at bedside. Pt c/o plegm/cough.      ROS:   No nausea, no vomiting  No chest pain, improved shortness of breath    Exam:  Visit Vitals  /63 (BP 1 Location: Left arm, BP Patient Position: At rest)   Pulse (!) 59   Temp 97.4 °F (36.3 °C)   Resp 20   Ht 6' (1.829 m)   Wt 67.8 kg (149 lb 7.6 oz)   SpO2 97%   BMI 20.27 kg/m²       Elderly frail, in NAD  No icterus, mmm  Clear with dec BS at bases  RRR, +SM  No edema  Alert awake    Current Facility-Administered Medications   Medication Dose Route Frequency Last Dose    0.9% sodium chloride infusion  50 mL/hr IntraVENous CONTINUOUS Stopped at 19 1504    sodium chloride (NS) flush 5-40 mL  5-40 mL IntraVENous Q8H 10 mL at 19 1554    sodium chloride (NS) flush 5-40 mL  5-40 mL IntraVENous PRN      furosemide (LASIX) tablet 40 mg  40 mg Oral BID 40 mg at 19 0930    doxazosin (CARDURA) tablet 1 mg  1 mg Oral QHS 1 mg at 19 2213    lisinopril (PRINIVIL, ZESTRIL) tablet 20 mg  20 mg Oral DAILY 20 mg at 07/30/19 0930    potassium chloride SR (KLOR-CON 10) tablet 40 mEq  40 mEq Oral DAILY Stopped at 07/30/19 0900    cholecalciferol (VITAMIN D3) tablet 2,000 Units  2,000 Units Oral DAILY Stopped at 07/30/19 0900    diphenhydrAMINE (BENADRYL) capsule 25 mg  25 mg Oral QHS PRN      dutasteride (AVODART) capsule 0.5 mg  0.5 mg Oral DAILY Stopped at 07/30/19 0900    ferrous sulfate tablet 325 mg  325 mg Oral BID Stopped at 07/30/19 0900    levothyroxine (SYNTHROID) tablet 175 mcg  175 mcg Oral  mcg at 07/30/19 0715    melatonin tablet 9 mg  9 mg Oral QHS 9 mg at 07/29/19 2213    pantoprazole (PROTONIX) 40 mg in sodium chloride 0.9% 10 mL injection  40 mg IntraVENous Q12H 40 mg at 07/30/19 1554    thiamine HCL (B-1) tablet 100 mg  100 mg Oral DAILY Stopped at 07/30/19 0900    sodium chloride (NS) flush 5-40 mL  5-40 mL IntraVENous Q8H 10 mL at 07/30/19 0342    sodium chloride (NS) flush 5-40 mL  5-40 mL IntraVENous PRN      acetaminophen (TYLENOL) tablet 650 mg  650 mg Oral Q4H PRN      ondansetron (ZOFRAN) injection 4 mg  4 mg IntraVENous Q4H PRN      bisacodyl (DULCOLAX) tablet 5 mg  5 mg Oral DAILY PRN      hydrALAZINE (APRESOLINE) 20 mg/mL injection 10 mg  10 mg IntraVENous Q6H PRN 10 mg at 07/26/19 0514    hydrALAZINE (APRESOLINE) tablet 25 mg  25 mg Oral TID 25 mg at 07/30/19 1554       Labs/Data:    Lab Results   Component Value Date/Time    WBC 12.3 (H) 07/29/2019 03:47 AM    HGB 8.4 (L) 07/29/2019 03:47 AM    HCT 26.9 (L) 07/29/2019 03:47 AM    PLATELET 493 59/32/6666 03:47 AM    MCV 90.6 07/29/2019 03:47 AM       Lab Results   Component Value Date/Time    Sodium 136 07/29/2019 03:47 AM    Potassium 3.4 (L) 07/29/2019 03:47 AM    Chloride 101 07/29/2019 03:47 AM    CO2 30 07/29/2019 03:47 AM    Anion gap 5 07/29/2019 03:47 AM    Glucose 142 (H) 07/29/2019 03:47 AM    BUN 57 (H) 07/29/2019 03:47 AM    Creatinine 1.77 (H) 07/29/2019 03:47 AM    BUN/Creatinine ratio 32 (H) 07/29/2019 03:47 AM    GFR est AA 44 (L) 07/29/2019 03:47 AM    GFR est non-AA 36 (L) 07/29/2019 03:47 AM    Calcium 8.9 07/29/2019 03:47 AM       Wt Readings from Last 3 Encounters:   07/30/19 67.8 kg (149 lb 7.6 oz)   07/15/13 91.4 kg (201 lb 8 oz)   07/02/13 85.3 kg (188 lb)         Intake/Output Summary (Last 24 hours) at 7/30/2019 1631  Last data filed at 7/30/2019 1520  Gross per 24 hour   Intake 100 ml   Output 125 ml   Net -25 ml       Patient seen and examined. Chart reviewed. Labs, data and other pertinent notes reviewed in last 24 hrs.     PMH/SH/FH reviewed and unchanged compared to H&P    Discussed with pt /family and RN      Shalini Perez MD   NSP[facility]

## 2019-07-30 NOTE — ROUTINE PROCESS
Bedside shift change report given to [address] (oncoming nurse) by Yoly Hauser (offgoing nurse). Report included the following information SBAR, Kardex, Intake/Output, Recent Results, Med Rec Status and Cardiac Rhythm AFIB.

## 2019-07-30 NOTE — PROGRESS NOTES
Hospitalist Progress Note  Janice Wilson MD  Answering service: [phone] OR [phone] from in house phone        Date of Service:  2019  NAME:  Luis Trujillo  :  1926  MRN:  [id number]      Admission Summary:    19-year-old man with past medical history significant for hypertension, hypothyroidism, chronic atrial fibrillation, colon cancer; status post colon resection, skin cancer, and chronic kidney disease was in his usual state of health until a few weeks ago when the patient developed shortness of breath. Interval history / Subjective:     Patient comfortably sitting in the chair. States that breathing is better. Family -daughter and wife at bed side - daughter states that there is deciding about whether to go ahead with EGD or not. Want to talk to patient's cardiologist or cardiology team about anticoagulation vs aspirin. She also mentioned that her father wants to continue eliquis. Assessment & Plan:     #Acute on chronic diastolic heart failure:NYHA class 4 at presentation-improving  #Biltaeral pleural effusion:  CT chest showed bilateral moderate to large pleural effusions with left lower lobe atelectasis. -echo shows concentric hypertrophy of the left ventricle  -Diuresed  well on IV diuretics -oral lasix BID  -CXR still has b/l pleural effusions but patient's symptoms have improved. -strict I and O  -daily weights. Leukocytosis:   Mild elevation,no fever,cough or SOB  -Obtain CXR. Oropharyngeal dysphagia:  -noted coughing with liquids yesterday  -speech eval ordered. #Renal failure- acute vs chronic:  Acute - cardio renal vs use of NSAIDs, may be he also has some degree of CKD.   -last known cr is 0.83 in 2013  -cr sightly high today 1.7  -Nephrology following    #Atrial fib :  -CHADS 2 vasc -4  -hold eliquis for now due to anemia-concern for GI bleed  -EKG shows junctional rythym -discontinued verapamil -HR better now. Hypokalemia:replete as needed    #Anemia:  -Hb 7.9 at admission,unknown base line  -stool occult positive  -was taking NSAIDs PTA and on eliquis  -Hb stable 8-8.5  -Q 12 hr H and H  -GI following -  -Patient's family to decide whether to pursue EGD or not based on resumption of anticoagulation    #HTN:  -BP stable. Continue amlodipine,lisnipril 20 and cardura 1mg. #Hypothyroiism:  -synthroid    Code status: DNR  DVT prophylaxis: SCD    Care Plan discussed with: patient,ayla at bed side  Disposition:TBD     Hospital Problems  Date Reviewed: 7/25/2019          Codes Class Noted POA    * (Principal) Acute on chronic systolic CHF (congestive heart failure) (HCC) ICD-10-CM: I50.23  ICD-9-CM: 428.23, 428.0  7/25/2019 Yes                Review of Systems:   As per HPI      Vital Signs:    Last 24hrs VS reviewed since prior progress note. Most recent are:  Visit Vitals  /67 (BP 1 Location: Left arm, BP Patient Position: Sitting)   Pulse 66   Temp 97.6 °F (36.4 °C)   Resp 16   Ht 6' (1.829 m)   Wt 69.6 kg (153 lb 7 oz)   SpO2 96%   BMI 20.81 kg/m²       No intake or output data in the 24 hours ending 07/29/19 2024     Physical Examination:             Constitutional:  No acute distress   ENT:  Oral mucous moist, oropharynx benign. Resp:  b/l decreased breath sounds at bases,    CV:  Regular rhythm, normal heart rate    GI:  Soft, non distended, non tender. normoactive bowel sounds    Musculoskeletal:   LE edema improving    Neurologic:  Moves all extremities. AAOx3, occasional episodes of confusion     Psych:   Not anxious nor agitated.        Data Review:    Review and/or order of clinical lab test,  Review and/or order of tests in the medicine section of CPT      Labs:     Recent Labs     07/29/19 0347 07/28/19 0343   WBC 12.3* 6.3   HGB 8.4* 8.6*   HCT 26.9* 27.6*    179     Recent Labs     07/29/19 0347 07/28/19 0343 07/27/19 0327    139 141   K 3.4* 3.2* 3.2*    100 102   CO2 30 31 32   BUN 57* 43* 38*   CREA 1.77* 1.51* 1.31*   * 126* 127*   CA 8.9 9.0 9.6   MG  --   --  1.8   PHOS  --   --  3.5     Recent Labs     07/27/19  0327   TP 6.2     No results for input(s): INR, PTP, APTT in the last 72 hours. No lab exists for component: INREXT, INREXT   No results for input(s): FE, TIBC, PSAT, FERR in the last 72 hours. Lab Results   Component Value Date/Time    Folate 9.8 07/26/2019 06:03 AM      No results for input(s): PH, PCO2, PO2 in the last 72 hours. No results for input(s): CPK, CKNDX, TROIQ in the last 72 hours.     No lab exists for component: CPKMB  Lab Results   Component Value Date/Time    Cholesterol, total 142 07/26/2019 06:03 AM    HDL Cholesterol 79 07/26/2019 06:03 AM    LDL, calculated 56.2 07/26/2019 06:03 AM    Triglyceride 34 07/26/2019 06:03 AM    CHOL/HDL Ratio 1.8 07/26/2019 06:03 AM     Lab Results   Component Value Date/Time    Glucose (POC) 148 (H) 07/11/2013 09:36 PM     Lab Results   Component Value Date/Time    Color YELLOW/STRAW 07/26/2019 12:58 PM    Appearance CLEAR 07/26/2019 12:58 PM    Specific gravity 1.008 07/26/2019 12:58 PM    pH (UA) 7.5 07/26/2019 12:58 PM    Protein NEGATIVE  07/26/2019 12:58 PM    Glucose NEGATIVE  07/26/2019 12:58 PM    Ketone NEGATIVE  07/26/2019 12:58 PM    Bilirubin NEGATIVE  07/26/2019 12:58 PM    Urobilinogen 1.0 07/26/2019 12:58 PM    Nitrites NEGATIVE  07/26/2019 12:58 PM    Leukocyte Esterase NEGATIVE  07/26/2019 12:58 PM         Medications Reviewed:     Current Facility-Administered Medications   Medication Dose Route Frequency    furosemide (LASIX) tablet 40 mg  40 mg Oral BID    doxazosin (CARDURA) tablet 1 mg  1 mg Oral QHS    lisinopril (PRINIVIL, ZESTRIL) tablet 20 mg  20 mg Oral DAILY    potassium chloride SR (KLOR-CON 10) tablet 40 mEq  40 mEq Oral DAILY    cholecalciferol (VITAMIN D3) tablet 2,000 Units  2,000 Units Oral DAILY    diphenhydrAMINE (BENADRYL) capsule 25 mg  25 mg Oral QHS PRN    dutasteride (AVODART) capsule 0.5 mg  0.5 mg Oral DAILY    ferrous sulfate tablet 325 mg  325 mg Oral BID    levothyroxine (SYNTHROID) tablet 175 mcg  175 mcg Oral ACB    melatonin tablet 9 mg  9 mg Oral QHS    pantoprazole (PROTONIX) 40 mg in sodium chloride 0.9% 10 mL injection  40 mg IntraVENous Q12H    thiamine HCL (B-1) tablet 100 mg  100 mg Oral DAILY    sodium chloride (NS) flush 5-40 mL  5-40 mL IntraVENous Q8H    sodium chloride (NS) flush 5-40 mL  5-40 mL IntraVENous PRN    acetaminophen (TYLENOL) tablet 650 mg  650 mg Oral Q4H PRN    ondansetron (ZOFRAN) injection 4 mg  4 mg IntraVENous Q4H PRN    bisacodyl (DULCOLAX) tablet 5 mg  5 mg Oral DAILY PRN    hydrALAZINE (APRESOLINE) 20 mg/mL injection 10 mg  10 mg IntraVENous Q6H PRN    hydrALAZINE (APRESOLINE) tablet 25 mg  25 mg Oral TID     ______________________________________________________________________  EXPECTED LENGTH OF STAY: 3d 7h  ACTUAL LENGTH OF STAY:          4                 Deion Vasquez MD

## 2019-07-30 NOTE — PROGRESS NOTES
Spiritual Care Partner Volunteer visited patient in room 356/01  on 7.30.19. Documented by: : Rev. Isabel Smith.  Neo Hinds; [facility], to contact [phone] [address] call: [phone]

## 2019-07-30 NOTE — PROGRESS NOTES
Problem: Mobility Impaired (Adult and Pediatric)  Goal: *Acute Goals and Plan of Care (Insert Text)  Description  FUNCTIONAL STATUS PRIOR TO ADMISSION: Patient was modified independent using a Walker for functional mobility. HOME SUPPORT: Typically lives in [address] [place] [address] although recently in Healthcare at [address] [place] [address] 2* to wife being admitted to healthcare unit-was not receiving therapy. Physical Therapy Goals  Initiated 7/30/2019  1. Patient will move from supine to sit and sit to supine , scoot up and down and roll side to side in bed with modified independence within 7 day(s). 2.  Patient will transfer from bed to chair and chair to bed with modified independence using the least restrictive device within 7 day(s). 3.  Patient will perform sit to stand with modified independence within 7 day(s). 4.  Patient will ambulate with modified independence for 300 feet with the least restrictive device within 7 day(s). Outcome: Progressing Towards Goal     PHYSICAL THERAPY EVALUATION  Patient: Lani Cole (5 y.o. male)  Date: 7/30/2019  Primary Diagnosis: Acute on chronic systolic CHF (congestive heart failure) ([facility]) [I50.23]        Precautions:   Fall    ASSESSMENT :  Based on the objective data described below, the patient presents with generalized weakness especially at hips, impaired balance during ambulation, gait deviations significant for scissoring, h/o falls, and overall decreased independence from baseline following admission for acute on chronic CHF. PTA patient was mod I with RW and was in healthcare unit at [address] [place] [address] because wife was admitted. Typically he lives in [address] [place] [address] although is considering transition to [facility]. Currently is CGA for transfers. Ambulated with RW and min A secondary to scissoring and narrow GEETA, occasionally also hits RW with foot during swing phase of gait. Also noted significant Trendelenburg bilaterally. Patient remained OOB on bench in room with family present.   Recommend SNF with PT at discharge. Patient will benefit from skilled intervention to address the above impairments. Patients rehabilitation potential is considered to be Good  Factors which may influence rehabilitation potential include:   ? None noted  ? Mental ability/status  ? Medical condition  ? Home/family situation and support systems  ? Safety awareness  ? Pain tolerance/management  ? Other:      PLAN :  Recommendations and Planned Interventions:  ?           Bed Mobility Training             ? Neuromuscular Re-Education  ? Transfer Training                   ? Orthotic/Prosthetic Training  ? Gait Training                         ? Modalities  ? Therapeutic Exercises           ? Edema Management/Control  ? Therapeutic Activities            ? Patient and Family Training/Education  ? Other (comment):    Frequency/Duration: Patient will be followed by physical therapy 5 times a week to address goals. Discharge Recommendations: Skilled Nursing Facility  Further Equipment Recommendations for Discharge: TBD      SUBJECTIVE:   Patient stated I think when I've fallen I haven't had my walker. 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    HX TONSIL AND ADENOIDECTOMY      AS CHILD     Prior Level of Function/Home Situation: mod I with RW, significant fall history  Personal factors and/or comorbidities impacting plan of care:     Home Situation  Home Environment: Independent living(*Recently staying in healthcare section to be with wife)  [facility] Name: [address] [place] [address]  # Steps to Enter: (none)  One/Two Story Residence: One story  # of Interior Steps: (none)  Height of Each Step (in): (none)  Interior Rails: None  Lift Chair Available: No  Living Alone: No  Support Systems: Family member(s)([facility]] healthcare staff)  Patient Expects to be Discharged to[de-identified] ([address] [place] [address] section)  Current DME Used/Available at Home: Walker, rolling, Cane, straight  Tub or Shower Type: Tub/Shower combination    EXAMINATION/PRESENTATION/DECISION MAKING:   Critical Behavior:  Neurologic State: Alert  Orientation Level: Oriented X4  Cognition: Follows commands, Poor safety awareness  Safety/Judgement: Decreased awareness of need for safety, Decreased awareness of need for assistance, Decreased insight into deficits  Hearing: Auditory  Auditory Impairment: Hard of hearing, bilateral  Hearing Aids/Status: At home  Skin:    Edema:   Range Of Motion:  AROM: Generally decreased, functional           PROM: Generally decreased, functional           Strength:    Strength: Generally decreased, functional                    Tone & Sensation:   Tone: Normal              Sensation: Intact               Coordination:  Coordination: Generally decreased, functional  Vision:   Tracking: Able to track stimulus in all quadrants w/o difficulty  Visual Fields: ( Appears intact)  Functional Mobility:  Bed Mobility:     Supine to Sit: (received up in chair)        Transfers:  Sit to Stand: Contact guard assistance; Adaptive equipment  Stand to Sit: Contact guard assistance; Adaptive equipment  Stand Pivot Transfers: Contact guard assistance     Bed to Chair: Contact guard assistance; Adaptive equipment              Balance:   Sitting: Intact  Standing: Impaired  Standing - Static: Constant support;Good  Standing - Dynamic : Fair;Constant support  Ambulation/Gait Training:  Distance (ft): 300 Feet (ft)  Assistive Device: Gait belt;Walker, rolling  Ambulation - Level of Assistance: Minimal assistance        Gait Abnormalities: Decreased step clearance; Path deviations;Scissoring;Trendelenburg        Base of Support: Narrowed     Speed/Ambreen: Pace decreased (<100 feet/min)  Step Length: Right shortened;Left shortened                     Stairs: Therapeutic Exercises:       Functional Measure:  Timed up and go:    Timed Get Up And Go Test: 15       < than 10 seconds=Normal  Greater then 13.5 seconds (in elderly)=Increased fall risk   Carlos Alberto LONG, Didier Gallo. Predicting the probability for falls in community dwelling older adults using the Timed Up and Go Test. Phys Ther. 2000;80:896-903. Pain:  Pain Scale 1: Numeric (0 - 10)  Pain Intensity 1: 0              Activity Tolerance:   Improving  Please refer to the flowsheet for vital signs taken during this treatment. After treatment:   ?         Patient left in no apparent distress sitting up in chair  ? Patient left in no apparent distress in bed  ? Call bell left within reach  ? Nursing notified  ? Caregiver present  ? Bed alarm activated    COMMUNICATION/EDUCATION:   The patients plan of care was discussed with: Registered Nurse. ?         Fall prevention education was provided and the patient/caregiver indicated understanding. ? Patient/family have participated as able in goal setting and plan of care. ?         Patient/family agree to work toward stated goals and plan of care. ?         Patient understands intent and goals of therapy, but is neutral about his/her participation. ? Patient is unable to participate in goal setting and plan of care.     Thank you for this referral.  Oneal Thomas, PT   Time Calculation: 17 mins

## 2019-07-30 NOTE — ANESTHESIA PREPROCEDURE EVALUATION
Relevant Problems   No relevant active problems       Anesthetic History   No history of anesthetic complications            Review of Systems / Medical History  Patient summary reviewed, nursing notes reviewed and pertinent labs reviewed    Pulmonary  Within defined limits              Comments: Pleural effusion   Neuro/Psych   Within defined limits           Cardiovascular    Hypertension      CHF  Dysrhythmias       Exercise tolerance: >4 METS  Comments: pulm htn  EF 60%   GI/Hepatic/Renal               Comments: colon cancer; status post colon resection Endo/Other        Anemia     Other Findings            Physical Exam    Airway  Mallampati: I  TM Distance: > 6 cm  Neck ROM: normal range of motion   Mouth opening: Normal     Cardiovascular    Rhythm: regular  Rate: normal         Dental  No notable dental hx       Pulmonary  Breath sounds clear to auscultation               Abdominal         Other Findings            Anesthetic Plan    ASA: 3  Anesthesia type: MAC          Induction: Intravenous  Anesthetic plan and risks discussed with: Patient and Son / Daughter

## 2019-07-30 NOTE — PROGRESS NOTES
Hospitalist Progress Note  Izzy Glasgow MD  Answering service: [phone] OR [phone] from in house phone        Date of Service:  2019  NAME:  Katharina William  :  1926  MRN:  [id number]      Admission Summary:    70-year-old man with past medical history significant for hypertension, hypothyroidism, chronic atrial fibrillation, colon cancer; status post colon resection, skin cancer, and chronic kidney disease was in his usual state of health until a few weeks ago when the patient developed shortness of breath. Interval history / Subjective:   Sitting in chair by the bedside,on room air. No complaints. Assessment & Plan:     #Acute on chronic diastolic heart failure:NYHA class 4 at presentation-improving  #Biltaeral pleural effusion:  CT chest showed bilateral moderate to large pleural effusions with left lower lobe atelectasis. -echo shows concentric hypertrophy of the left ventricle,preserved EF,pulmonary hypertension.  -Diuresed  well with IV diuretics -now on oral lasix 40 mg BID  -F/u cxr..>decreased bilateral effusions.  -strict I and O  -daily weights.  -On room air. Elevated WBC noted on  without fever or other sxs. Leukocytosis:Remained afebrile   -,CXR:The pleural effusions have decreased in the interval. There is mild edema. Mild atelectasis is seen in the left lower lobe. The bones are osteopenic.  -No Am lab. Ordered    Oropharyngeal dysphagia:  -noted coughing with liquids   -speech eval ordered. #Renal failure- acute vs chronic:  Acute - cardio renal vs use of NSAIDs, may be he also has some degree of CKD. -last known cr is 0.83 in 2013  -cr sightly high today 1.7  -Nephrology following    #Atrial fib :  -CHADS 2 vasc -4  -hold eliquis for now due to anemia-concern for GI bleed  -EKG shows junctional rythym -discontinued verapamil -HR better now.     Hypokalemia:replete as needed    #Anemia:  -Hb 7.9 at [employer]  -stool occult positive  -was taking NSAIDs PTA and on eliquis. Continue PPI BID  -Hb stable 8-8.5  -Q 12 hr H and H  -GI following,possible EGD today. #HTN:  -BP stable. Continue amlodipine,lisnipril 20 and cardura 1mg. #Hypothyroiism:  -synthroid    Code status: DNR  DVT prophylaxis: SCD    Care Plan discussed with:patient,no family in the room. Disposition:TBD     Hospital Problems  Date Reviewed: 7/25/2019          Codes Class Noted POA    * (Principal) Acute on chronic systolic CHF (congestive heart failure) (HCC) ICD-10-CM: I50.23  ICD-9-CM: 428.23, 428.0  7/25/2019 Yes                Review of Systems:   As per HPI      Vital Signs:    Last 24hrs VS reviewed since prior progress note. Most recent are:  Visit Vitals  /81 (BP 1 Location: Left arm, BP Patient Position: At rest)   Pulse (!) 53   Temp 97.6 °F (36.4 °C)   Resp 16   Ht 6' (1.829 m)   Wt 67.8 kg (149 lb 7.6 oz)   SpO2 96%   BMI 20.27 kg/m²       No intake or output data in the 24 hours ending 07/30/19 0840     Physical Examination:             Constitutional:  No acute distress   ENT:  Oral mucous moist, oropharynx benign. Resp:  On room air,b/l decreased breath sounds at bases,    CV:  Regular rhythm, normal heart rate    GI:  Soft, non distended, non tender. normoactive bowel sounds    Musculoskeletal:   LE edema mild    Neurologic:  Moves all extremities. AAOx3, occasional episodes of confusion     Psych:   Not anxious nor agitated.        Data Review:    Review and/or order of clinical lab test,  Review and/or order of tests in the medicine section of CPT      Labs:     Recent Labs     07/29/19 0347 07/28/19  0343   WBC 12.3* 6.3   HGB 8.4* 8.6*   HCT 26.9* 27.6*    179     Recent Labs     07/29/19 0347 07/28/19  0343    139   K 3.4* 3.2*    100   CO2 30 31   BUN 57* 43*   CREA 1.77* 1.51*   * 126*   CA 8.9 9.0     No results for input(s): SGOT, GPT, ALT, AP, TBIL, TBILI, TP, ALB, GLOB, GGT, AML, LPSE in the last 72 hours. No lab exists for component: AMYP, HLPSE  No results for input(s): INR, PTP, APTT in the last 72 hours. No lab exists for component: INREXT, INREXT   No results for input(s): FE, TIBC, PSAT, FERR in the last 72 hours. Lab Results   Component Value Date/Time    Folate 9.8 07/26/2019 06:03 AM      No results for input(s): PH, PCO2, PO2 in the last 72 hours. No results for input(s): CPK, CKNDX, TROIQ in the last 72 hours.     No lab exists for component: CPKMB  Lab Results   Component Value Date/Time    Cholesterol, total 142 07/26/2019 06:03 AM    HDL Cholesterol 79 07/26/2019 06:03 AM    LDL, calculated 56.2 07/26/2019 06:03 AM    Triglyceride 34 07/26/2019 06:03 AM    CHOL/HDL Ratio 1.8 07/26/2019 06:03 AM     Lab Results   Component Value Date/Time    Glucose (POC) 148 (H) 07/11/2013 09:36 PM     Lab Results   Component Value Date/Time    Color YELLOW/STRAW 07/26/2019 12:58 PM    Appearance CLEAR 07/26/2019 12:58 PM    Specific gravity 1.008 07/26/2019 12:58 PM    pH (UA) 7.5 07/26/2019 12:58 PM    Protein NEGATIVE  07/26/2019 12:58 PM    Glucose NEGATIVE  07/26/2019 12:58 PM    Ketone NEGATIVE  07/26/2019 12:58 PM    Bilirubin NEGATIVE  07/26/2019 12:58 PM    Urobilinogen 1.0 07/26/2019 12:58 PM    Nitrites NEGATIVE  07/26/2019 12:58 PM    Leukocyte Esterase NEGATIVE  07/26/2019 12:58 PM         Medications Reviewed:     Current Facility-Administered Medications   Medication Dose Route Frequency    furosemide (LASIX) tablet 40 mg  40 mg Oral BID    doxazosin (CARDURA) tablet 1 mg  1 mg Oral QHS    lisinopril (PRINIVIL, ZESTRIL) tablet 20 mg  20 mg Oral DAILY    potassium chloride SR (KLOR-CON 10) tablet 40 mEq  40 mEq Oral DAILY    cholecalciferol (VITAMIN D3) tablet 2,000 Units  2,000 Units Oral DAILY    diphenhydrAMINE (BENADRYL) capsule 25 mg  25 mg Oral QHS PRN    dutasteride (AVODART) capsule 0.5 mg  0.5 mg Oral DAILY    ferrous sulfate tablet 325 mg  325 mg Oral BID    levothyroxine (SYNTHROID) tablet 175 mcg  175 mcg Oral ACB    melatonin tablet 9 mg  9 mg Oral QHS    pantoprazole (PROTONIX) 40 mg in sodium chloride 0.9% 10 mL injection  40 mg IntraVENous Q12H    thiamine HCL (B-1) tablet 100 mg  100 mg Oral DAILY    sodium chloride (NS) flush 5-40 mL  5-40 mL IntraVENous Q8H    sodium chloride (NS) flush 5-40 mL  5-40 mL IntraVENous PRN    acetaminophen (TYLENOL) tablet 650 mg  650 mg Oral Q4H PRN    ondansetron (ZOFRAN) injection 4 mg  4 mg IntraVENous Q4H PRN    bisacodyl (DULCOLAX) tablet 5 mg  5 mg Oral DAILY PRN    hydrALAZINE (APRESOLINE) 20 mg/mL injection 10 mg  10 mg IntraVENous Q6H PRN    hydrALAZINE (APRESOLINE) tablet 25 mg  25 mg Oral TID     ______________________________________________________________________  EXPECTED LENGTH OF STAY: 3d 7h  ACTUAL LENGTH OF STAY:          5                 Sidney Armstrong MD

## 2019-07-30 NOTE — CDMP QUERY
Pt admitted with acute on chronic diastolic heart failure. Pt noted to have acute kidney injury, unclear with or without CKD. If possible, please document in the progress notes and d/c summary if you are evaluating and / or treating any of the following:       Acute kidney injury   Renal failure, unspecified   Other cause (please specify)   Unable to determine        The medical record reflects the following:    Risk Factors: 80year old male with acute CHF requiring IV diuresis and chronic NSAID use over past 2 months    Clinical Indicators:  7/26- Renal consult \"He also had renal failure with BUN of 43 and creatinine of 1.51 on admit. .. I suspect he may have either acute kidney injury versus a component of acute kidney injury on mild chronic kidney disease. \"  7/27 Cardiology PN-\"Acute renal failure: renal indices improving\"  7/26-Cr 1.35,  7/27-Cr 1.31, Cr-1.51, Cr 1.77     Treatment: Renal consult, monitor renal labs      Thank you,     Norma RECINOSN, RN  CDI   [phone]

## 2019-07-30 NOTE — PROGRESS NOTES
Bedside shift change report given to Mendel Snooks, RN (oncoming nurse) by Petr Reeves RN (offgoing nurse). Report included the following information SBAR, Kardex, Intake/Output, MAR, Recent Results and Cardiac Rhythm AFib.

## 2019-07-30 NOTE — PROGRESS NOTES
Orders received, chart reviewed and patient evaluated by physical therapy. Recommend patient to discharge to SNF pending progress with skilled acute care physical therapy. Recommend with nursing patient to complete as able in order to maintain strength, endurance and independence: OOB to chair 3x/day with CGA and ambulating with min A + RW. Thank you for your assistance. Full evaluation to follow.

## 2019-07-30 NOTE — PROGRESS NOTES
Orders received, chart reviewed and patient evaluated by occupational therapy. Recommend patient to discharge back to [address] [place] [address] (with skilled OT intervention) pending progression with skilled acute occupational therapy. Pt and family agreed verbally with this plan. Recommend with nursing patient to complete as able in order to maintain strength, endurance and independence: OOB to chair 3x/day, ADLs with supervision/setup and mobilizing to the bathroom for toileting with one assist with walker. Thank you for your assistance. Full evaluation to follow.    Marisel WOODS/INGA

## 2019-07-30 NOTE — PERIOP NOTES

## 2019-07-30 NOTE — PERIOP NOTES
TRANSFER - IN REPORT:    Verbal report received from ok CEJA on Mikala Baumann  being received from [facility] 07 for ordered procedure      Report consisted of patients Situation, Background, Assessment and   Recommendations(SBAR). Information from the following report(s) SBAR was reviewed with the receiving nurse. Opportunity for questions and clarification was provided. Assessment completed upon patients arrival to unit and care assumed. TRANSFER - OUT REPORT:    Verbal report given to ok CEJA on Mikala Baumann  being transferred to [facility]  for routine progression of care       Report consisted of patients Situation, Background, Assessment and   Recommendations(SBAR). Information from the following report(s) Procedure Summary was reviewed with the receiving nurse. Lines:   Peripheral IV 07/25/19 Left Forearm (Active)   Site Assessment Clean, dry, & intact 7/30/2019  9:27 AM   Phlebitis Assessment 0 7/30/2019  9:27 AM   Infiltration Assessment 0 7/30/2019  9:27 AM   Dressing Status Clean, dry, & intact 7/30/2019  9:27 AM   Dressing Type Transparent;Tape 7/30/2019  9:27 AM   Hub Color/Line Status Pink;Capped 7/30/2019  9:27 AM   Action Taken Open ports on tubing capped 7/30/2019  9:27 AM   Alcohol Cap Used Yes 7/30/2019  9:27 AM        Opportunity for questions and clarification was provided.       Patient transported with:   [employer]

## 2019-07-30 NOTE — ROUTINE PROCESS
Luis Cassandra  1/5/1926  [id number]    Situation:  Verbal report received from: [address] [place] [address] RN  Procedure: Procedure(s):  ESOPHAGOGASTRODUODENOSCOPY (EGD)    Background:    Preoperative diagnosis: Anemia  Postoperative diagnosis: 1. [place] erosions  2. Hiatal hurnia    :  Dr. Azar Lester  Assistant(s): Endoscopy Technician-1: Milo CHAUDHARI  Endoscopy RN-1: Aditi Martino    Specimens: * No specimens in log *  H. Pylori  no    Assessment:  Intra-procedure medications     Anesthesia gave intra-procedure sedation and medications, see anesthesia flow sheet yes    Intravenous fluids: NS@ KVO     Vital signs stable     Abdominal assessment: round and soft     Recommendation:    Return to floor  Family or Friend   Permission to share finding with family or friend yes

## 2019-07-30 NOTE — PROGRESS NOTES
Problem: Self Care Deficits Care Plan (Adult)  Goal: *Therapy Goal (Edit Goal, Insert Text)  Description    FUNCTIONAL STATUS PRIOR TO ADMISSION: Patient was modified independent using a Walker for functional mobility. HOME SUPPORT: At baseline pt resides at [address] [place] [address]. In past 2 weeks pt has been staying in healthcare portion of [facility] to be near wife who has been receiving care in healthcare. Pt was not receiving OT in healthcare section. Occupational Therapy Goals  Initiated 7/30/2019  1. Patient will perform bathing with modified independence within 7 day(s). 2.  Patient will perform lower body dressing with modified independence within 7 day(s). 3.  Patient will perform grooming standing sink level with modified independence within 7 day(s). 4.  Patient will perform toilet transfers with modified independence within 7 day(s). 5.  Patient will perform all aspects of toileting with modified independence within 7 day(s). 6.  Patient will participate in upper extremity therapeutic exercise/activities with supervision/ set up assist for 10 minutes within 7 day(s). 7.  Patient will utilize energy conservation techniques during functional activities with verbal and visual cues within 7 day(s). Outcome: Not Progressing Towards Goal  OCCUPATIONAL THERAPY EVALUATION  Patient: Alon Santos (5 y.o. male)  Date: 7/30/2019  Primary Diagnosis: Acute on chronic systolic CHF (congestive heart failure) ([facility]) [I50.23]        Precautions:   Fall    ASSESSMENT :  Pt was received seated in window seat with daughter present. Pt agreeable to OT. Based on the objective data described below, the patient presents with impaired standing balance, decreased safety awareness, impaired activity tolerance, and subsequently impaired ADL and related mobility. Pt overall ADL performance minimum to set up A level.  Pt overall functional transfer performance CGA with walker with cuing for safe walker use required (pt reportedly has used walker x ~8 months and not received consistent therapy). Pt with multiple fall history. Family reports pt and wife need to consider transition to [facility] from independent living setting but wife has been resistant. Patient will benefit from skilled intervention to address the above impairments. Patients rehabilitation potential is considered to be Good  Factors which may influence rehabilitation potential include:   ? None noted  ? Mental ability/status  ? Medical condition  ? Home/family situation and support systems  ? Safety awareness  ? Pain tolerance/management  ? Other:      PLAN :  Recommendations and Planned Interventions:  ?               Self Care Training                  ? Therapeutic Activities  ? Functional Mobility Training    ? Cognitive Retraining  ? Therapeutic Exercises           ? Endurance Activities  ? Balance Training                   ? Neuromuscular Re-Education  ? Visual/Perceptual Training     ? Home Safety Training  ? Patient Education                 ? Family Training/Education  ? Other (comment):    Frequency/Duration: Patient will be followed by occupational therapy 5 times a week to address goals. Discharge Recommendations: Rehab ([address] [place] [address] section with OT)  Further Equipment Recommendations for Discharge: grab bars, shower bench, ? BSC      SUBJECTIVE:   Patient stated I plan on leaving today. 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    HX TONSIL AND ADENOIDECTOMY      AS CHILD       Expanded or extensive additional review of patient history:     Home Situation  Home Environment: Independent living(*Recently staying in healthcare section to be with wife)  [facility] Name: [address] [place] [address]  # Steps to Enter: (none)  One/Two Story Residence: One story  # of Interior Steps: (none)  Height of Each Step (in): (none)  Interior Rails: None  Lift Chair Available: No  Living Alone: No  Support Systems: Family member(s)([facility] healthcare staff)  Patient Expects to be Discharged to[de-identified] ([address] [place] [address] section)  Current DME Used/Available at Home: Walker, rolling, Cane, straight  Tub or Shower Type: Tub/Shower combination    Hand dominance: Right    EXAMINATION OF PERFORMANCE DEFICITS:  Cognitive/Behavioral Status:  Neurologic State: Alert  Orientation Level: Oriented X4  Cognition: Follows commands;Poor safety awareness  Perception: Cues to maintain midline in standing  Perseveration: No perseveration noted  Safety/Judgement: Decreased awareness of need for safety;Decreased awareness of need for assistance;Decreased insight into deficits    Skin: Dressing intact LUE proximal to elbow    Edema: No edema noted BUE    Hearing: Auditory  Auditory Impairment: Hard of hearing, bilateral  Hearing Aids/Status:  At home    Vision/Perceptual:    Tracking: Able to track stimulus in all quadrants w/o difficulty              Visual Fields: ( Appears intact)                 Range of Motion:    AROM: Generally decreased, functional  PROM: Generally decreased, functional                      Strength:    Strength: Generally decreased, functional                Coordination:  Coordination: Generally decreased, functional  Fine Motor Skills-Upper: Left Impaired;Right Impaired    Gross Motor Skills-Upper: Left Impaired;Right Impaired    Tone & Sensation:    Tone: Normal  Sensation: Intact                      Balance:  Sitting: Intact  Standing: Impaired  Standing - Static: Constant support;Good  Standing - Dynamic : Fair;Constant support    Functional Mobility and Transfers for ADLs:  Bed Mobility:  Supine to Sit: (received up in chair)    Transfers:  Sit to Stand: Contact guard assistance; Adaptive equipment  Stand to Sit: Contact guard assistance; Adaptive equipment  Bed to Chair: Contact guard assistance; Adaptive equipment  Bathroom Mobility: Contact guard assistance    ADL Assessment:  Feeding: Setup(Seated)    Oral Facial Hygiene/Grooming: Setup(Seated)    Bathing: Minimum assistance(Seated)    Upper Body Dressing: Setup    Lower Body Dressing: Minimum assistance    Toileting: Contact guard assistance                ADL Intervention and task modifications:                                     Cognitive Retraining  Safety/Judgement: Decreased awareness of need for safety;Decreased awareness of need for assistance;Decreased insight into deficits      Functional Measure:  Barthel Index:    Bathin  Bladder: 5  Bowels: 10  Groomin  Dressin  Feeding: 10  Mobility: 10  Stairs: 0  Toilet Use: 5  Transfer (Bed to Chair and Back): 10  Total: 60/100        Percentage of impairment   0%   1-19%   20-39%   40-59%   60-79%   80-99%   100%   Barthel Score 0-100 100 99-80 79-60 59-40 20-39 1-19   0     The Barthel ADL Index: Guidelines  1. The index should be used as a record of what a patient does, not as a record of what a patient could do. 2. The main aim is to establish degree of independence from any help, physical or verbal, however minor and for whatever reason. 3. The need for supervision renders the patient not independent. 4. A patient's performance should be established using the best available evidence. Asking the patient, friends/relatives and nurses are the usual sources, but direct observation and common sense are also important. However direct testing is not needed.   5. Usually the patient's performance over the preceding 24-48 hours is important, but occasionally longer periods will be relevant. 6. Middle categories imply that the patient supplies over 50 per cent of the effort. 7. Use of aids to be independent is allowed. Mimi Nguyen., Barthel, D.W. (7771). Functional evaluation: the Barthel Index. [facility] (14)2. APOLLO Velázquez, Yoon Jo., Annette Silva., [place], [address] [place] [address] (1999). Measuring the change indisability after inpatient rehabilitation; comparison of the responsiveness of the Barthel Index and Functional [place] Measure. Journal of Neurology, Neurosurgery, and Psychiatry, 66(4), 303-613. Payal Lopez, N.J.A, LAYTON Black, & Melissa Potter MLorenzaA. (2004.) Assessment of post-stroke quality of life in cost-effectiveness studies: The usefulness of the Barthel Index and the EuroQoL-5D. Quality of Life Research, 15, 596-67        Occupational Therapy Evaluation Charge Determination   History Examination Decision-Making   LOW Complexity : Brief history review  MEDIUM Complexity : 3-5 performance deficits relating to physical, cognitive , or psychosocial skils that result in activity limitations and / or participation restrictions MEDIUM Complexity : Patient may present with comorbidities that affect occupational performnce. Miniml to moderate modification of tasks or assistance (eg, physical or verbal ) with assesment(s) is necessary to enable patient to complete evaluation       Based on the above components, the patient evaluation is determined to be of the following complexity level: LOW   Pain:  Pain Scale 1: Numeric (0 - 10)  Pain Intensity 1: 0              Activity Tolerance:   Fair- generalized weakness primary limitation. After treatment:   ? Patient left in no apparent distress sitting up in chair  ? Patient left in no apparent distress in bed  ? Call bell left within reach  ? Nursing notified  ? Caregiver present  ?  Bed alarm activated    COMMUNICATION/EDUCATION:   The patients plan of care was discussed with: Physical Therapist and Registered Nurse.  ? Home safety education was provided and the patient/caregiver indicated understanding. ? Patient/family have participated as able in goal setting and plan of care. ? Patient/family agree to work toward stated goals and plan of care. ? Patient understands intent and goals of therapy, but is neutral about his/her participation. ? Patient is unable to participate in goal setting and plan of care. This patients plan of care is appropriate for delegation to [facility].     Thank you for this referral.  Chio Restrepo  Time Calculation: 23 mins

## 2019-07-30 NOTE — PROGRESS NOTES
Speech pathology note  Reviewed chart and note patient NPO for possible EGD today. Will follow when cleared for PO intake. Thank you.     Yen Cronin., CCC-SLP

## 2019-07-30 NOTE — PROGRESS NOTES
0730 Bedside shift change report given to Iona Segovia, BRENNA and Rakesh Titus RN (oncoming nurse) by Celeste White RN (offgoing nurse). Report included the following information SBAR, Kardex, Intake/Output, MAR, Recent Results and Cardiac Rhythm A fib. Problem: Heart Failure: Discharge Outcomes  Goal: *Left ventricular function assessment completed prior to or during stay, or planned for post-discharge  Outcome: Resolved/Met  Note:   Echo performed 7/26/19 shows EF of 56-60%. Problem: Falls - Risk of  Goal: *Absence of Falls  Description  Document Candida Lightning Fall Risk and appropriate interventions in the flowsheet. Note:   Fall Risk Interventions:  Mobility Interventions: Assess mobility with egress test, Bed/chair exit alarm, Communicate number of staff needed for ambulation/transfer, Patient to call before getting OOB, Utilize walker, cane, or other assistive device, PT Consult for mobility concerns    Mentation Interventions: Bed/chair exit alarm, Door open when patient unattended, More frequent rounding, Toileting rounds    Medication Interventions: Assess postural VS orthostatic hypotension, Bed/chair exit alarm, Evaluate medications/consider consulting pharmacy, Patient to call before getting OOB, Teach patient to arise slowly    Elimination Interventions: Patient to call for help with toileting needs    History of Falls Interventions: Bed/chair exit alarm, Consult care management for discharge planning, Door open when patient unattended, Evaluate medications/consider consulting pharmacy    Bed in low position, locked bed wheels. Call bell and personal items within reach. Hourly rounding performed. Instructed patient to call when needing assistance. Patient demonstrates understanding.

## 2019-07-30 NOTE — ANESTHESIA POSTPROCEDURE EVALUATION
Post-Anesthesia Evaluation and Assessment    Patient: Lety Rodgers MRN: [id number]  SSN: [id number]    YOB: 1926  Age: 80 y.o. Sex: male      I have evaluated the patient and they are stable and ready for discharge from the PACU. Cardiovascular Function/Vital Signs  Visit Vitals  BP (!) 82/49   Pulse (!) 53   Temp 36.1 °C (96.9 °F)   Resp 26   Ht 6' (1.829 m)   Wt 67.8 kg (149 lb 7.6 oz)   SpO2 91%   BMI 20.27 kg/m²       Patient is status post MAC anesthesia for Procedure(s):  ESOPHAGOGASTRODUODENOSCOPY (EGD). Nausea/Vomiting: None    Postoperative hydration reviewed and adequate. Pain:  Pain Scale 1: Visual (07/30/19 1444)  Pain Intensity 1: 0 (07/30/19 1444)   Managed    Neurological Status:   Neuro  Neurologic State: Alert (07/30/19 1132)  Orientation Level: Oriented X4 (07/30/19 1132)  Cognition: Follows commands;Poor safety awareness (07/30/19 1132)  Speech: Clear (07/30/19 0927)  LUE Motor Response: Purposeful (07/30/19 0023)  LLE Motor Response: Weak (07/30/19 0927)  RUE Motor Response: Purposeful (07/30/19 0023)  RLE Motor Response: Weak (07/30/19 0927)   At baseline    Mental Status, Level of Consciousness: Alert and  oriented to person, place, and time    Pulmonary Status:   O2 Device: Nasal cannula (07/30/19 1444)   Adequate oxygenation and airway patent    Complications related to anesthesia: None    Post-anesthesia assessment completed. No concerns    Signed By: Savita Brooks MD     July 30, 2019              Procedure(s):  ESOPHAGOGASTRODUODENOSCOPY (EGD). MAC    <BSHSIANPOST>    Vitals Value Taken Time   BP 69/41 7/30/2019  2:51 PM   Temp 36.1 °C (96.9 °F) 7/30/2019  2:42 PM   Pulse 47 7/30/2019  2:52 PM   Resp 42 7/30/2019  2:52 PM   SpO2 92 % 7/30/2019  2:52 PM   Vitals shown include unvalidated device data.
+knee pain +neck pain +back pain +confusion/PAIN

## 2019-07-31 NOTE — PROGRESS NOTES
Problem: Mobility Impaired (Adult and Pediatric) Goal: *Acute Goals and Plan of Care (Insert Text) Description FUNCTIONAL STATUS PRIOR TO ADMISSION: Patient was modified independent using a Walker for functional mobility. HOME SUPPORT: Typically lives in [address] [place] [address] although recently in Healthcare at [facility]* to wife being admitted to healthcare unit-was not receiving therapy. Physical Therapy Goals Initiated 7/30/2019 1. Patient will move from supine to sit and sit to supine , scoot up and down and roll side to side in bed with modified independence within 7 day(s). 2.  Patient will transfer from bed to chair and chair to bed with modified independence using the least restrictive device within 7 day(s). 3.  Patient will perform sit to stand with modified independence within 7 day(s). 4.  Patient will ambulate with modified independence for 300 feet with the least restrictive device within 7 day(s). Outcome: Progressing Towards Goal 
 PHYSICAL THERAPY TREATMENT Patient: Jean Marie Suresh (12 y.o. male) Date: 7/31/2019 Diagnosis: Acute on chronic systolic CHF (congestive heart failure) ([facility]) [I50.23] Acute on chronic systolic CHF (congestive heart failure) ([facility] Procedure(s) (LRB): ESOPHAGOGASTRODUODENOSCOPY (EGD) (Left) 1 Day Post-Op Precautions: Fall Chart, physical therapy assessment, plan of care and goals were reviewed. ASSESSMENT: 
Pt received sitting in chair, fully dressed, eager to participate. Per RN, pt found standing in room reading alarm box earlier this date. Pt mobilized with CGA overall for transfers and amb using RW. Impulsively reached to retrieve object from floor in bathroom and completed task with single UE support and CGA, no LOB. Pt tolerated amb on unit with gait deviations as noted. Pt appears to demo near baseline LOF at this time, but presents with general weakness, balance and gait deficits which increase risk for fall. Reviewed need for ongoing supervision with mobility at this time; pt verbalized understanding. Recommend brief course of SNF level rehab at d/c. Progression toward goals: 
?    Improving appropriately and progressing toward goals ? Improving slowly and progressing toward goals ? Not making progress toward goals and plan of care will be adjusted PLAN: 
Patient continues to benefit from skilled intervention to address the above impairments. Continue treatment per established plan of care. Discharge Recommendations:  Zheng Barbour Further Equipment Recommendations for Discharge:  rolling walker v rollator SUBJECTIVE:  
Patient reported awareness of chair alarm in place OBJECTIVE DATA SUMMARY:  
Critical Behavior: 
Neurologic State: Alert, Confused Orientation Level: Oriented to person, Oriented to place, Oriented to situation, Disoriented to time Cognition: Impaired decision making, Follows commands Safety/Judgement: Decreased insight into deficits Functional Mobility Training: 
Bed Mobility: 
  
 NT Transfers: 
Sit to Stand: Contact guard assistance Stand to Sit: Supervision Balance: 
Sitting: Intact Standing: Impaired Standing - Static: Good Standing - Dynamic : Fair Ambulation/Gait Training: 
Distance (ft): 150 Feet (ft) Assistive Device: Gait belt;Walker, rolling Ambulation - Level of Assistance: Contact guard assistance Gait Abnormalities: Decreased step clearance; Path deviations;Trendelenburg(stooped posture) Base of Support: Narrowed Speed/Ambreen: Pace decreased (<100 feet/min) Step Length: Left shortened;Right shortened Pain: 
 Pt denied c/o pain Activity Tolerance:  
Pt tolerated activity with stable vitals, no c/o Please refer to the flowsheet for vital signs taken during this treatment. After treatment:  
?    Patient left in no apparent distress sitting up in chair ?    Patient left in no apparent distress in bed 
? Call bell left within reach ? Nursing notified ? Caregiver present ? Chair alarm activated COMMUNICATION/COLLABORATION:  
The patients plan of care was discussed with: Occupational Therapist and Registered Nurse Kristin Rebollar PT Time Calculation: 12 mins

## 2019-07-31 NOTE — PROGRESS NOTES
Problem: Dysphagia (Adult) Goal: *Acute Goals and Plan of Care (Insert Text) Description Speech pathology goals Initiated 7/31/2019 1. Family will verbalize understanding of dysphagia and aspiration risk within 7 days. Initiated 7/29/2019 1. Patient will tolerate regular/nectar liquid diet with no overt s/s aspiration within 7 days DISCONTINUE 2. Patient will participate in MBS as clinically indicated MET Outcome: Progressing Towards Goal 
  
SPEECH PATHOLOGY MODIFIED BARIUM SWALLOW STUDY Patient: Lexus Flood (75 y.o. male) Date: 7/31/2019 Primary Diagnosis: Acute on chronic systolic [facility] [I50.23] Procedure(s) (LRB): ESOPHAGOGASTRODUODENOSCOPY (EGD) (Left) 1 Day Post-Op Precautions:   Fall ASSESSMENT : 
Based on the objective data described below, the patient presents with functional oral but severe pharyngeal dysphagia likely in conjunction with a suspected esophageal dysphagia. Pharyngeal swallow dysfunction characterized by delayed swallow initiation, decreased base of tongue retraction, decreased hyolaryngeal excursion, incomplete epiglottic retroflexion, weak pharyngeal stripping wave, and impaired UES opening, the combination of which results in both significant pharyngeal residue as well as maddi aspiration both during and after the swallow. Pt is only intermittently sensate to episodes of penetration and aspiration and, when pt cued to cough, is ineffective in clearing airway of aspirated and penetrated contents. Suspect aspiration/penetration the combined result of significant pharyngeal residue as well as overall weakness/debility/delay. Suspect current swallow dysfunction is the direct result of likely chronic swallowing deficits 2/2 to kyphosis of spine adversely impacting pharyngeal stripping wave as well as epiglottic retroflexion, overall pharyngeal weakness 2/2 to advanced age, as well as dementia.   At this juncture, cannot safely recommend a PO diet given these significant risk factors. However, potential benefits of tube feeding (PEG) do not outweigh the associated treatment burdens in the setting of dementia. Given that dysphagia is likely chronic in nature, pt on room air, and these contraindications, would strongly consider goals of care/palliative discussion with team and family as pt without significant O2 needs as a result of this aspiration and could pursue comfort feedings with all parties acknowledging potential adverse effects of PO diet. Discussed with RN and MD.  
 
Patient will benefit from skilled intervention to address the above impairments. Patients rehabilitation potential is considered to be Guarded Factors which may influence rehabilitation potential include:  
? None noted ? Mental ability/status ? Medical condition ? Home/family situation and support systems ? Safety awareness ? Pain tolerance/management ? Other: PLAN : 
Recommendations and Planned Interventions: 
--should pt family and team wish to pursue aggressive measures: strict NPO with alternate non-oral means of nutrition. --should pt family and team wish to pursue comfort measures with all parties acknowledging the potential detrimental effects of aspiration: regular diet/thin liquids with strict aspiration precautions as thickened liquids do not provide any clinical benefits 
       --Would recommend this option given dementia diagnosis, advanced age, poor prognosis of recovery for swallow function, and no significant O2 requirements. Frequency/Duration: Patient will be followed by speech-language pathology 1 time a week to address goals. Discharge Recommendations: None SUBJECTIVE:  
Patient stated, \"Did I fail? . OBJECTIVE:  
 
Past Medical History:  
Diagnosis Date  Arrhythmia   
 irrigular heart beat- A FIB  
  Cancer [facility]) 2013 COLON   
 Cancer ([facility] SKIN  
 Colon cancer ([facility] [address] 7/1/2013  Hypertension Past Surgical History:  
Procedure Laterality Date  HX OTHER SURGICAL    
 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  
 HX TONSIL AND ADENOIDECTOMY AS CHILD Prior Level of Function/Home Situation:  
Home Situation Home Environment: Independent living(*Recently staying in healthcare section to be with wife) Care Facility Name: [address] [place] [address] # Steps to Enter: (none) One/Two Story Residence: One story # of Interior Steps: (none) Height of Each Step (in): (none) Interior Rails: None Lift Chair Available: No 
Living Alone: No 
Support Systems: Family member(s)([facility] healthcare staff) Patient Expects to be Discharged to[de-identified] ([address] [place] [address] healthcare section) Current DME Used/Available at Home: Walker, rolling, Cane, straight Tub or Shower Type: Tub/Shower combination Diet prior to admission: Regular diet/thin liquids Current Diet:  NPO Radiologist: Dr. Cee Matos Film Views: Lateral;Fluoro Patient Position: Upright in Hausted chair Trial 1:  
Consistency Presented: Solid;Puree; Thin liquid; Nectar thick liquid How Presented: Cup/sip;Straw;Successive swallows; Self-fed/presented;Spoon Bolus Acceptance: No impairment Bolus Formation/Control: No impairment:    
Propulsion: No impairment Oral Residue: None Initiation of Swallow: Triggered at vallecula;Triggered at pyriform sinus(es) Timing: Pooling 1-5 sec;Pyriform sinus;Vallecular Penetration: During swallow; After swallow; To cords; To laryngeal vestibule;From residual  
Aspiration/Timing: During; After;From residual;Silent (Intermittently sensate) Pharyngeal Clearance: Vallecular residue;Pyriform residue ;Greater than 50% Attempted Modifications: (Dry swallow to remove nectar-thick liquid residue) Effective Modifications: (Dry swallow with nectar initially cleared residue) Cues for Modifications: Moderate Decreased Tongue Base Retraction?: Yes Laryngeal Elevation: Inadequate epiglottic inversion; Incomplete laryngeal closure; Reduced excursion with laryngeal vestibule gap Aspiration/Penetration Score: 8 (Aspiration-Contrast passes cords/glottis with no effort to eject, ie/silent aspiration) Pharyngeal Symmetry: Not assessed Pharyngeal-Esophageal Segment: (hypertrophic) Pharyngeal Dysfunction: Decreased tongue base retraction;Decreased strength;Decreased elevation/closure;Decreased pharyngeal wall constriction(kyphosis) Oral Phase Severity: No impairment Pharyngeal Phase Severity: Severe NOMS:  
The NOMS functional outcome measure was used to quantify this patient's level of swallowing impairment. Based on the NOMS, the patient was determined to be at level 1 for swallow function NOMS Swallowing Levels: 
Level 1 (CN): NPO Level 2 (CM): NPO but takes consistency in therapy Level 3 (CL): Takes less than 50% of nutrition p.o. and continues with nonoral feedings; and/or safe with mod cues; and/or max diet restriction Level 4 (CK): Safe swallow but needs mod cues; and/or mod diet restriction; and/or still requires some nonoral feeding/supplements Level 5 (CJ): Safe swallow with min diet restriction; and/or needs min cues Level 6 (CI): Independent with p.o.; rare cues; usually self cues; may need to avoid some foods or needs extra time Level 7 (CH): Independent for all p.o. LOUISE. (2003). National Outcomes Measurement System (NOMS): Adult Speech-Language Pathology User's Guide. COMMUNICATION/EDUCATION:  
 
 
The patients plan of care including findings from MBS, recommendations, planned interventions, and recommended diet changes were discussed with: Registered Nurse and Physician. ? Posted safety precautions in patient's room. ? Patient/family have participated as able in goal setting and plan of care. ?  Patient/family agree to work toward stated goals and plan of care. ?  Patient understands intent and goals of therapy, but is neutral about his/her participation. ? Patient is unable to participate in goal setting and plan of care. Thank you for this referral. 
Jaylyn Rogers M.Ed, CCC-SLP Speech-Language Pathologist 
Time Calculation: 20 mins

## 2019-07-31 NOTE — PROGRESS NOTES
SPEECH LANGUAGE PATHOLOGY DYSPHAGIA TREATMENT Patient: Augie Wilhelm (74 y.o. male) Date: 7/31/2019 Diagnosis: Acute on chronic systolic CHF (congestive heart failure) ([facility]) [I50.23] Acute on chronic systolic CHF (congestive heart failure) ([facility] [address] Procedure(s) (LRB): ESOPHAGOGASTRODUODENOSCOPY (EGD) (Left) 1 Day Post-Op Precautions: Aspiration, Fall ASSESSMENT: 
Patient underwent EGD yesterday with findings of hiatal hernia and Zeyad erosions. Per RN, he has a wet vocal quality at times since after EGD yesterday but no overt s/s of aspiration with PO. Per RN he has some reduced function on entire left side noted this morning. Per RN staff from [address] [place] [address] reported no diet/liquid modifications prior to admission. Patient was given nectar-thick liquids by small cup sips with weak/decreased laryngeal elevation, audible swallow, multiple swallows and immediate significant laryngeal wetness per cervical auscultation. PO stopped and notified RN and MD. Will perform MBS today to further assess swallow function and safety. PLAN: 
Recommendations and Planned Interventions: NPO until MBS today Patient continues to benefit from skilled intervention to address the above impairments. Continue treatment per established plan of care. Discharge Recommendations: to be determined SUBJECTIVE:  
Patient stated I don't notice anything. OBJECTIVE:  
Cognitive and Communication Status: 
Neurologic State: Alert, Confused Orientation Level: Oriented X4 Cognition: Impaired decision making, Follows commands Perception: Cues to maintain midline in standing Perseveration: Perseverates during conversation Safety/Judgement: Decreased insight into deficits Dysphagia Treatment: 
Oral Assessment: P.O. Trials: 
Patient Position: (up in chair) Vocal quality prior to P.O.: Wet Consistency Presented: Nectar thick liquid How Presented: Self-fed/presented;Cup/sip Bolus Acceptance: No impairment Bolus Formation/Control: (no impariment for nectar liquids) Propulsion: Delayed (# of seconds) Oral Residue: None Initiation of Swallow: Delayed (# of seconds) Laryngeal Elevation: Weak;Decreased Aspiration Signs/Symptoms: Change vocal quality(immediate significant laryngeal wet. w cervical auscultion) Pharyngeal Phase Characteristics: Altered vocal quality; Audible swallow;Multiple swallows; Other (comment)(weak laryngeal elevation, decreased airway protection) Exercises: 
Laryngeal Exercises: 
  
  
  
  
  
  
  
  
  
  
  
  
  
  
  
  
  
  
  
  
  
  
  
  
  
  
  
  
  
  
  
  
  
  
  
  
  
  
  
  
  
  
  
Pain: After treatment:  
?              Patient left in no apparent distress sitting up in chair ? Patient left in no apparent distress in bed 
? Call bell left within reach ? Nursing notified ? Caregiver present ? Bed alarm activated COMMUNICATION/EDUCATION:  
Patient was educated regarding His deficit(s) of dysphagia as this relates to His diagnosis of CHF. He demonstrated Guarded understanding as evidenced by limited verbalization and decreased insight. The patients plan of care including recommendations, planned interventions, and recommended diet changes were discussed with: Registered Nurse and Physician. ? Posted safety precautions in patient's room. GRAHAM Lu Time Calculation: 14 mins

## 2019-07-31 NOTE — PROGRESS NOTES
[facility] 
[address], [address] GI PROGRESS NOTE Will Margaret Severs, [address] [facility] office [phone] NP/PA in-hospital cell phone M-F until 4:30PM 
After 5PM or on weekends, please call  for physician on call NAME: Katharina William :  1926 MRN:  [id number] Subjective:  
Patient denies nausea, vomiting, or abdominal pain. No bowel movement today. EGD was done yesterday. SLP evaluated this morning and modified barium swallow was completed. Objective: VITALS:  
Last 24hrs VS reviewed since prior progress note. Most recent are: 
Visit Vitals /72 (BP 1 Location: Left arm, BP Patient Position: Sitting) Pulse 80 Temp 96.3 °F (35.7 °C) Resp 16 Ht 6' (1.829 m) Wt 65.9 kg (145 lb 4.5 oz) SpO2 94% BMI 19.70 kg/m² PHYSICAL EXAM: 
General: Cooperative, no acute distress   
Neurologic:  Alert and oriented HEENT: EOMI, no scleral icterus Lungs:  Diminished bilaterally anteriorly Heart:  S1 S2 Abdomen: Soft, non-distended, no tenderness, no guarding, no rebound. +Bowel sounds. Extremities: Warm Psych:   Not anxious or agitated Lab Data Reviewed:  
 
Recent Results (from the past 24 hour(s)) METABOLIC PANEL, COMPREHENSIVE Collection Time: 19  6:45 PM  
Result Value Ref Range Sodium 138 136 - 145 mmol/L Potassium 3.4 (L) 3.5 - 5.1 mmol/L Chloride 100 97 - 108 mmol/L  
 CO2 29 21 - 32 mmol/L Anion gap 9 5 - 15 mmol/L Glucose 251 (H) 65 - 100 mg/dL BUN 52 (H) 6 - 20 MG/DL Creatinine 1.80 (H) 0.70 - 1.30 MG/DL  
 BUN/Creatinine ratio 29 (H) 12 - 20 GFR est AA 43 (L) >60 ml/min/1.73m2 GFR est non-AA 35 (L) >60 ml/min/1.73m2 Calcium 9.0 8.5 - 10.1 MG/DL Bilirubin, total 0.5 0.2 - 1.0 MG/DL  
 ALT (SGPT) 31 12 - 78 U/L  
 AST (SGOT) 34 15 - 37 U/L Alk. phosphatase 71 45 - 117 U/L Protein, total 6.8 6.4 - 8.2 g/dL Albumin 3.2 (L) 3.5 - 5.0 g/dL Globulin 3.6 2.0 - 4.0 g/dL A-G Ratio 0.9 (L) 1.1 - 2.2 MAGNESIUM Collection Time: 07/30/19  6:45 PM  
Result Value Ref Range Magnesium 1.9 1.6 - 2.4 mg/dL CBC WITH AUTOMATED DIFF Collection Time: 07/31/19  3:33 AM  
Result Value Ref Range WBC 5.5 4.1 - 11.1 K/uL  
 RBC 3.08 (L) 4.10 - 5.70 M/uL HGB 8.6 (L) 12.1 - 17.0 g/dL HCT 28.3 (L) 36.6 - 50.3 % MCV 91.9 80.0 - 99.0 FL  
 MCH 27.9 26.0 - 34.0 PG  
 MCHC 30.4 30.0 - 36.5 g/dL  
 RDW 16.3 (H) 11.5 - 14.5 % PLATELET 266 699 - 750 K/uL MPV 9.3 8.9 - 12.9 FL  
 NRBC 0.0 0  WBC ABSOLUTE NRBC 0.00 0.00 - 0.01 K/uL NEUTROPHILS 79 (H) 32 - 75 % LYMPHOCYTES 10 (L) 12 - 49 % MONOCYTES 7 5 - 13 % EOSINOPHILS 4 0 - 7 % BASOPHILS 0 0 - 1 % IMMATURE GRANULOCYTES 0 0.0 - 0.5 % ABS. NEUTROPHILS 4.3 1.8 - 8.0 K/UL  
 ABS. LYMPHOCYTES 0.6 (L) 0.8 - 3.5 K/UL  
 ABS. MONOCYTES 0.4 0.0 - 1.0 K/UL  
 ABS. EOSINOPHILS 0.2 0.0 - 0.4 K/UL  
 ABS. BASOPHILS 0.0 0.0 - 0.1 K/UL  
 ABS. IMM. GRANS. 0.0 0.00 - 0.04 K/UL  
 DF SMEAR SCANNED    
 RBC COMMENTS ANISOCYTOSIS 1+ 
    
 RBC COMMENTS OVALOCYTES PRESENT 
    
 RBC COMMENTS ELINA CELLS 
PRESENT 
    
 RBC COMMENTS POLYCHROMASIA PRESENT 
    
METABOLIC PANEL, BASIC Collection Time: 07/31/19  3:33 AM  
Result Value Ref Range Sodium 140 136 - 145 mmol/L Potassium 3.5 3.5 - 5.1 mmol/L Chloride 103 97 - 108 mmol/L  
 CO2 30 21 - 32 mmol/L Anion gap 7 5 - 15 mmol/L Glucose 103 (H) 65 - 100 mg/dL BUN 50 (H) 6 - 20 MG/DL Creatinine 1.50 (H) 0.70 - 1.30 MG/DL  
 BUN/Creatinine ratio 33 (H) 12 - 20 GFR est AA 53 (L) >60 ml/min/1.73m2 GFR est non-AA 44 (L) >60 ml/min/1.73m2 Calcium 8.9 8.5 - 10.1 MG/DL Assessment: · Anemia with hemoccult positive stool: Iron low, ferritin normal. Eliquis on hold. On iron. History of NSAID use. EGD (7/30/19): hiatal hernia with Ryan Peek erosions. Hgb 8.6, platelets 053. · Acute on chronic systolic congestive heart failure: cardiology following · Pleural effusions · Atrial fibrillation: on Eliquis (on hold). · Acute vs chronic kidney failure: nephrology following · Hypertension · Hypothyroidism Patient Active Problem List  
Diagnosis Code  [facility] C18.9  Acute on chronic systolic CHF (congestive heart failure) ([facility]) I50.23 Plan:  
· Continue PPI · Monitor CBC · Eliquis has been restarted. If recurrent anemia, transfuse PRBCs versus consider M2A capsule endoscopy. Signed By: DEBRA Dallas   
 7/31/2019  1:03 PM 
  
 
GI Attending: Agree with plan as above. MBS results reviewed and appreciate SLP evaluation. Will await family's decision. If they want to proceed with PEG tube placement, I would advise that this is placed with IR given his hiatal hernia on endoscopic exam. Will follow along with you. Richard Starkey MD

## 2019-07-31 NOTE — PROGRESS NOTES
Problem: Self Care Deficits Care Plan (Adult) Goal: *Therapy Goal (Edit Goal, Insert Text) Description FUNCTIONAL STATUS PRIOR TO ADMISSION: Patient was modified independent using a Walker for functional mobility. HOME SUPPORT: At baseline pt resides at [address] [place] [address]. In past 2 weeks pt has been staying in [facility] to be near wife who has been receiving care in healthcare. Pt was not receiving OT in healthcare section. Occupational Therapy Goals Initiated 7/30/2019 1. Patient will perform bathing with modified independence within 7 day(s). 2.  Patient will perform lower body dressing with modified independence within 7 day(s). 3.  Patient will perform grooming standing sink level with modified independence within 7 day(s). 4.  Patient will perform toilet transfers with modified independence within 7 day(s). 5.  Patient will perform all aspects of toileting with modified independence within 7 day(s). 6.  Patient will participate in upper extremity therapeutic exercise/activities with supervision/ set up assist for 10 minutes within 7 day(s). 7.  Patient will utilize energy conservation techniques during functional activities with verbal and visual cues within 7 day(s). Outcome: Progressing Towards Goal 
 OCCUPATIONAL THERAPY TREATMENT Patient: Richie Jaime (53 y.o. male) Date: 7/31/2019 Diagnosis: Acute on chronic systolic CHF (congestive heart failure) ([facility]) [I50.23] Acute on chronic systolic CHF (congestive heart failure) ([facility] [address] Procedure(s) (LRB): ESOPHAGOGASTRODUODENOSCOPY (EGD) (Left) 1 Day Post-Op Precautions: Fall Chart, occupational therapy assessment, plan of care, and goals were reviewed. ASSESSMENT: 
Nursing cleared for therapy. Patient received in chair, dressed. Patient appears close to his baseline.   Rreports ability to don clothing at indep level this AM.  Amb to bathroom with CGA at RW level, no LOB however severely kyphotic impairing his base of support and causing him to often push walker out in front of him. He is impulsive with poor insight (most likely baseline) including reaching items from floor while holding onto walker and unsafe proximity from sink with washing hands. He is living with his wife in the [facility]. He would benefit from brief therapy services to maximize and maintain his balance and mobility. Progression toward goals: 
?       Improving appropriately and progressing toward goals ? Improving slowly and progressing toward goals ? Not making progress toward goals and plan of care will be adjusted PLAN: 
Patient continues to benefit from skilled intervention to address the above impairments. Continue treatment per established plan of care. Discharge Recommendations:  [facility] Further Equipment Recommendations for Discharge:  has all needed DME SUBJECTIVE:  
Patient stated My wife was a dead soul and the therapy brought her back to life.  OBJECTIVE DATA SUMMARY:  
Cognitive/Behavioral Status: 
Neurologic State: Alert;Confused Orientation Level: Oriented to person;Oriented to place;Oriented to situation;Disoriented to time Cognition: Impaired decision making; Follows commands Perseveration: Perseverates during conversation Safety/Judgement: Decreased insight into deficits Functional Mobility and Transfers for ADLs: 
Bed Mobility: 
  
 
Transfers: 
Sit to Stand: Contact guard assistance Functional Transfers Toilet Transfer : Stand-by assistance(standing ) Balance: 
Sitting: Intact Standing: Impaired Standing - Static: Good Standing - Dynamic : Fair ADL Intervention: 
  
Provided ed on home safety including importance of RW use, clear pathways, close proximity to items prior to reaching/stepping up to sink and slow pace to decrease fall risks.   Patient reports verbal understanding however is impulsive and lacks insight to poor safety awareness. He demo ability to complete amb to bathroom with RW at CGA, standing to simulate urination and hand hygiene at sink with CGA. Kyphotic posture through out session in sitting and standing. Lef tup in chair and ed on importance of calling for assistance prior to getting up. Grooming Washing Hands: Stand-by assistance(ed for proximity to sink) Cognitive Retraining Safety/Judgement: Decreased insight into deficits Pain: 
Denies pain Activity Tolerance:  
Fair for basic ADl tasks. After treatment:  
? Patient left in no apparent distress sitting up in chair ? Patient left in no apparent distress in bed 
? Call bell left within reach ? Nursing notified ? Caregiver present ? Bed alarm activated COMMUNICATION/COLLABORATION:  
The patients plan of care was discussed with: Physical Therapist and Registered Nurse Propser Rivas OT Time Calculation: 13 mins

## 2019-07-31 NOTE — PROGRESS NOTES
Cardiology  Events noted  Better with regression of effusions  Back on her [address] plans noted  Will see prn   Have him follow up with her usual S cardiologist, Dr Harless Snellen  Thanks

## 2019-07-31 NOTE — PROGRESS NOTES
Hospitalist Progress Note Claribel Zuñiga MD 
Answering service: [phone] OR [phone] from in house phone Date of Service:  2019 NAME:  Jolly Mattson :  1926 MRN:  [id number] Admission Summary:  
 27-year-old man with past medical history significant for hypertension, hypothyroidism, chronic atrial fibrillation, colon cancer; status post colon resection, skin cancer, and chronic kidney disease was in his usual state of health until a few weeks ago when the patient developed shortness of breath. Interval history / Subjective:  
Sitting in chair by the bedside,reading. No complaints. NPO for BMS. Updated Jessie Glow the phone about speech eval and discharge planning afterwards. Also discussed with her GI and cardiology recommendations regarding anticoagulation,family okay with resuming Eliquis Assessment & Plan: #Acute on chronic diastolic heart failure:NYHA class 4 at presentation-improving #Biltaeral pleural effusion: 
CT chest showed bilateral moderate to large pleural effusions with left lower lobe atelectasis. -echo shows concentric hypertrophy of the left ventricle,preserved EF,pulmonary hypertension. 
-Diuresed  well with IV diuretics -now on oral lasix 40 mg BID 
-F/u cxr..>decreased bilateral effusions. 
-strict I and O 
-daily weights. 
-On room air. Elevated WBC noted on  without fever or other sxs. Leukocytosis:Remained afebrile  
-,CXR:The pleural effusions have decreased in the interval. There is mild edema. Mild atelectasis is seen in the left lower lobe. The bones are osteopenic. -WBC normalized. Oropharyngeal dysphagia: 
-noted coughing with liquids  
-speech eval ordered. -NPO for BMS #Renal failure- acute vs chronic,cherry chronic Acute - cardio renal vs use of NSAIDs, may be he also has some degree of CKD. -last known cr is 0.83 in  -Nephrology following #Atrial fib : 
-CHADS 2 vasc -4 
-Resume Eliquis: GI and cardiology okay. -EKG shows junctional rythym -discontinued verapamil -HR better now. Hypokalemia:replete as needed #Anemia: 
-Hb 7.9 at admission,unknown base line 
-stool occult positive 
-was taking NSAIDs PTA and on eliquis. Continue PPI BID 
-Hb stable 8-8.5 
-Q 12 hr H and H 
-EGD showed Esophagus:normal Stomach: there were multiple linear erosions along the folds of the proximal stomach. These were endoscopically consistent with [place] erosions likely secondary to a sliding hiatal hernia. Duodenum: normal to second portion #HTN: 
-BP stable. Continue amlodipine,lisnipril 20 and cardura 1mg. #Hypothyroiism: 
-synthroid Code status: DNR 
DVT prophylaxis: SCD Care Plan discussed with:patient,no family in the room. Disposition:back to admission address today or tomorrow,pending speech eval outcomes and recommendations . Hospital Problems  Date Reviewed: 7/25/2019 Codes Class Noted POA * (Principal) Acute on chronic systolic CHF (congestive heart failure) (HCC) ICD-10-CM: A83.57 ICD-9-CM: 428.23, 428.0  7/25/2019 Yes Review of Systems: As per HPI Vital Signs:  
 Last 24hrs VS reviewed since prior progress note. Most recent are: 
Visit Vitals /72 (BP 1 Location: Left arm, BP Patient Position: Sitting) Pulse 80 Temp 96.3 °F (35.7 °C) Resp 16 Ht 6' (1.829 m) Wt 65.9 kg (145 lb 4.5 oz) SpO2 94% BMI 19.70 kg/m² Intake/Output Summary (Last 24 hours) at 7/31/2019 1247 Last data filed at 7/30/2019 1520 Gross per 24 hour Intake  Output 125 ml Net -125 ml Physical Examination:  
 
 
     
Constitutional:  No acute distress ENT:  Oral mucous moist, oropharynx benign. Resp:  On room air,b/l decreased breath sounds at bases, CV:  Regular rhythm, normal heart rate GI:  Soft, non distended, non tender. normoactive bowel sounds Musculoskeletal:   LE edema mild Neurologic:  Moves all extremities. AAOx3, occasional episodes of confusion Psych:   Not anxious nor agitated. Data Review:  
 Review and/or order of clinical lab test, Review and/or order of tests in the medicine section of [facility] Labs:  
 
Recent Labs  
  07/31/19 
6956 07/29/19 
0347 WBC 5.5 12.3* HGB 8.6* 8.4* HCT 28.3* 26.9*  
 177 Recent Labs  
  07/31/19 
0333 07/30/19 
1845 07/29/19 
0347  138 136  
K 3.5 3.4* 3.4*  
 100 101 CO2 30 29 30 BUN 50* 52* 57* CREA 1.50* 1.80* 1.77* * 251* 142* CA 8.9 9.0 8.9 MG  --  1.9  --   
 
Recent Labs  
  07/30/19 1845 SGOT 34 ALT 31  
AP 71 TBILI 0.5 TP 6.8 ALB 3.2*  
GLOB 3.6 No results for input(s): INR, PTP, APTT in the last 72 hours. No lab exists for component: INREXT, INREXT No results for input(s): FE, TIBC, PSAT, FERR in the last 72 hours. Lab Results Component Value Date/Time Folate 9.8 07/26/2019 06:03 AM  
  
No results for input(s): PH, PCO2, PO2 in the last 72 hours. No results for input(s): CPK, CKNDX, TROIQ in the last 72 hours. No lab exists for component: CPKMB Lab Results Component Value Date/Time Cholesterol, total 142 07/26/2019 06:03 AM  
 HDL Cholesterol 79 07/26/2019 06:03 AM  
 LDL, calculated 56.2 07/26/2019 06:03 AM  
 Triglyceride 34 07/26/2019 06:03 AM  
 CHOL/HDL Ratio 1.8 07/26/2019 06:03 AM  
 
Lab Results Component Value Date/Time Glucose (POC) 148 (H) 07/11/2013 09:36 PM  
 
Lab Results Component Value Date/Time  Color YELLOW/STRAW 07/26/2019 12:58 PM  
 Appearance CLEAR 07/26/2019 12:58 PM  
 Specific gravity 1.008 07/26/2019 12:58 PM  
 pH (UA) 7.5 07/26/2019 12:58 PM  
 Protein NEGATIVE  07/26/2019 12:58 PM  
 Glucose NEGATIVE  07/26/2019 12:58 PM  
 Ketone NEGATIVE  07/26/2019 12:58 PM  
 Bilirubin NEGATIVE  07/26/2019 12:58 PM  
 Urobilinogen 1.0 07/26/2019 12:58 PM  
 Nitrites NEGATIVE  07/26/2019 12:58 PM  
 Leukocyte Esterase NEGATIVE  07/26/2019 12:58 PM  
 
 
 
Medications Reviewed:  
 
Current Facility-Administered Medications Medication Dose Route Frequency  pantoprazole (PROTONIX) tablet 40 mg  40 mg Oral ACB&D  
 apixaban (ELIQUIS) tablet 2.5 mg  2.5 mg Oral BID  sodium chloride (NS) flush 5-40 mL  5-40 mL IntraVENous Q8H  
 sodium chloride (NS) flush 5-40 mL  5-40 mL IntraVENous PRN  
 furosemide (LASIX) tablet 40 mg  40 mg Oral BID  doxazosin (CARDURA) tablet 1 mg  1 mg Oral QHS  lisinopril (PRINIVIL, ZESTRIL) tablet 20 mg  20 mg Oral DAILY  potassium chloride SR (KLOR-CON 10) tablet 40 mEq  40 mEq Oral DAILY  cholecalciferol (VITAMIN D3) tablet 2,000 Units  2,000 Units Oral DAILY  diphenhydrAMINE (BENADRYL) capsule 25 mg  25 mg Oral QHS PRN  
 dutasteride (AVODART) capsule 0.5 mg  0.5 mg Oral DAILY  ferrous sulfate tablet 325 mg  325 mg Oral BID  levothyroxine (SYNTHROID) tablet 175 mcg  175 mcg Oral ACB  melatonin tablet 9 mg  9 mg Oral QHS  thiamine HCL (B-1) tablet 100 mg  100 mg Oral DAILY  sodium chloride (NS) flush 5-40 mL  5-40 mL IntraVENous Q8H  
 sodium chloride (NS) flush 5-40 mL  5-40 mL IntraVENous PRN  
 acetaminophen (TYLENOL) tablet 650 mg  650 mg Oral Q4H PRN  
 ondansetron (ZOFRAN) injection 4 mg  4 mg IntraVENous Q4H PRN  
 bisacodyl (DULCOLAX) tablet 5 mg  5 mg Oral DAILY PRN  
 hydrALAZINE (APRESOLINE) 20 mg/mL injection 10 mg  10 mg IntraVENous Q6H PRN  
 hydrALAZINE (APRESOLINE) tablet 25 mg  25 mg Oral TID  
 
______________________________________________________________________ EXPECTED LENGTH OF STAY: 3d 7h 
ACTUAL LENGTH OF STAY:          6 Lore Bolanos MD

## 2019-07-31 NOTE — PROGRESS NOTES
Name: Richie Jaime MRN: [id number] : 1926 Assessment: 
Renal failure- Cr holding b/w 1.5 to 1.8mg/dl. Acute vs Chronic CKD? No prior Cr available to compare. Even if this acute, he may end up likely having CKD going forward, given he has CHF (from Cardio-renal type of clinical picture). His URIEL more likely due to recent NSAIDs use (over 2 months) + pre-renal azotemia from CHF 
UA is benign Acute on Chronic diastolic CHF- Echo with nl EF. Weights coming down B/L pleural effusions Hypokalemia HTN Anemia with +stool occult blood- ? UGI bleed 2nd to NSAIDs use. EGD today c/w Hiatal hernia with [place] erosions Afib 
  
Plan/Recommendations: 
Ct Lasix 40 mg PO BID Oral KCl May have to hold Lisinopril if Cr rises precipitously Daily wt Ct Salt/fluid restriction NPO currently due to risk of aspiration. AM labs No NSAIDS as outpt Subjective: 
Walking with walker. MBS showed aspiration ROS:  
No nausea, no vomiting No chest pain, improved shortness of breath Exam: 
Visit Vitals /67 (BP 1 Location: Left arm, BP Patient Position: Sitting) Pulse (!) 58 Temp 97.4 °F (36.3 °C) Resp 16 Ht 6' (1.829 m) Wt 65.9 kg (145 lb 4.5 oz) SpO2 97% BMI 19.70 kg/m² Elderly frail, in NAD No icterus, mmm Clear with dec BS at bases L>R 
RRR, +SM No edema Alert awake Current Facility-Administered Medications Medication Dose Route Frequency Last Dose  pantoprazole (PROTONIX) tablet 40 mg  40 mg Oral ACB&D 40 mg at 19 1703  apixaban (ELIQUIS) tablet 2.5 mg  2.5 mg Oral BID 2.5 mg at 19 1703  sodium chloride (NS) flush 5-40 mL  5-40 mL IntraVENous Q8H 10 mL at 19 1400  
 sodium chloride (NS) flush 5-40 mL  5-40 mL IntraVENous PRN    
  furosemide (LASIX) tablet 40 mg  40 mg Oral BID 40 mg at 07/31/19 1703  doxazosin (CARDURA) tablet 1 mg  1 mg Oral QHS 1 mg at 07/30/19 2108  lisinopril (PRINIVIL, ZESTRIL) tablet 20 mg  20 mg Oral DAILY 20 mg at 07/31/19 0826  
 potassium chloride SR (KLOR-CON 10) tablet 40 mEq  40 mEq Oral DAILY 40 mEq at 07/31/19 7735  cholecalciferol (VITAMIN D3) tablet 2,000 Units  2,000 Units Oral DAILY 2,000 Units at 07/31/19 0238  diphenhydrAMINE (BENADRYL) capsule 25 mg  25 mg Oral QHS PRN 25 mg at 07/30/19 2113  dutasteride (AVODART) capsule 0.5 mg  0.5 mg Oral DAILY 0.5 mg at 07/31/19 1235  ferrous sulfate tablet 325 mg  325 mg Oral  mg at 07/31/19 1703  levothyroxine (SYNTHROID) tablet 175 mcg  175 mcg Oral  mcg at 07/31/19 0826  
 melatonin tablet 9 mg  9 mg Oral QHS 9 mg at 07/30/19 2108  thiamine HCL (B-1) tablet 100 mg  100 mg Oral DAILY 100 mg at 07/31/19 0826  
 sodium chloride (NS) flush 5-40 mL  5-40 mL IntraVENous Q8H 10 mL at 07/31/19 1400  
 sodium chloride (NS) flush 5-40 mL  5-40 mL IntraVENous PRN    
 acetaminophen (TYLENOL) tablet 650 mg  650 mg Oral Q4H PRN    
 ondansetron (ZOFRAN) injection 4 mg  4 mg IntraVENous Q4H PRN    
 bisacodyl (DULCOLAX) tablet 5 mg  5 mg Oral DAILY PRN    
 hydrALAZINE (APRESOLINE) 20 mg/mL injection 10 mg  10 mg IntraVENous Q6H PRN 10 mg at 07/26/19 5517  hydrALAZINE (APRESOLINE) tablet 25 mg  25 mg Oral TID 25 mg at 07/31/19 1703 Labs/Data: 
 
Lab Results Component Value Date/Time WBC 5.5 07/31/2019 03:33 AM  
 HGB 8.6 (L) 07/31/2019 03:33 AM  
 HCT 28.3 (L) 07/31/2019 03:33 AM  
 PLATELET 892 53/55/8062 03:33 AM  
 MCV 91.9 07/31/2019 03:33 AM  
 
 
Lab Results Component Value Date/Time  Sodium 140 07/31/2019 03:33 AM  
 Potassium 3.5 07/31/2019 03:33 AM  
 Chloride 103 07/31/2019 03:33 AM  
 CO2 30 07/31/2019 03:33 AM  
 Anion gap 7 07/31/2019 03:33 AM  
 Glucose 103 (H) 07/31/2019 03:33 AM  
 BUN 50 (H) 07/31/2019 03:33 AM  
 Creatinine 1.50 (H) 07/31/2019 03:33 AM  
 BUN/Creatinine ratio 33 (H) 07/31/2019 03:33 AM  
 GFR est AA 53 (L) 07/31/2019 03:33 AM  
 GFR est non-AA 44 (L) 07/31/2019 03:33 AM  
 Calcium 8.9 07/31/2019 03:33 AM  
 
 
Wt Readings from Last 3 Encounters:  
07/31/19 65.9 kg (145 lb 4.5 oz) 07/15/13 91.4 kg (201 lb 8 oz) 07/02/13 85.3 kg (188 lb) No intake or output data in the 24 hours ending 07/31/19 1706 Patient seen and examined. Chart reviewed. Labs, data and other pertinent notes reviewed in last 24 hrs. PMH/SH/FH reviewed and unchanged compared to H&P Discussed with pt Levi Galicia and RN Rosaline Jama MD  
NSP[facility]

## 2019-08-01 NOTE — PROGRESS NOTES
notes that patient is being discharged back to the healthcare unit at [facility] phone [phone]. The  covering the unit is unavailable and patient's nurse Blanca has followed up speaking with nurse Coates. Dr Peggy James spoke with daughter and patient and daughter will drive patient back to facility.

## 2019-08-01 NOTE — PROGRESS NOTES
Problem: Falls - Risk of 
Goal: *Absence of Falls Description Document Candida Lightning Fall Risk and appropriate interventions in the flowsheet. Outcome: Resolved/Met Note:  
Fall Risk Interventions: 
Mobility Interventions: Bed/chair exit alarm Mentation Interventions: Family/sitter at bedside, Bed/chair exit alarm Medication Interventions: Bed/chair exit alarm, Patient to call before getting OOB, Teach patient to arise slowly Elimination Interventions: Call light in reach, Bed/chair exit alarm, Patient to call for help with toileting needs History of Falls Interventions: Bed/chair exit alarm, Door open when patient unattended, Evaluate medications/consider consulting pharmacy, Investigate reason for fall, Room close to nurse's station, Utilize gait belt for transfer/ambulation, Assess for delayed presentation/identification of injury for 48 hrs (comment for end date) Problem: Patient Education: Go to Patient Education Activity Goal: Patient/Family Education Outcome: Resolved/Met Problem: Patient Education: Go to Patient Education Activity Goal: Patient/Family Education Outcome: Resolved/Met Problem: Nutrition Deficit Goal: *Optimize nutritional status Outcome: Resolved/Met Problem: Patient Education: Go to Patient Education Activity Goal: Patient/Family Education Outcome: Resolved/Met Problem: Heart Failure: Discharge Outcomes Goal: *Demonstrates ability to perform prescribed activity without shortness of breath or discomfort Outcome: Resolved/Met Goal: *ACEI prescribed if LVEF less than 40% and no contraindications or ARB prescribed Outcome: Resolved/Met Goal: *Verbalizes understanding and describes prescribed diet Outcome: Resolved/Met Goal: *Verbalizes understanding/describes prescribed medications Outcome: Resolved/Met Goal: *Describes available resources and support systems Description 
(eg: Home Health, Palliative Care, Advanced Medical Directive) Outcome: Resolved/Met Goal: *Describes smoking cessation resources Outcome: Resolved/Met Goal: *Understands and describes signs and symptoms to report to providers(Stroke Metric) Outcome: Resolved/Met Goal: *Describes/verbalizes understanding of follow-up/return appt Description 
(eg: to physicians, diabetes treatment coordinator, and other resources Outcome: Resolved/Met Goal: *Describes importance of continuing daily weights and changes to report to physician Outcome: Resolved/Met Problem: Patient Education: Go to Patient Education Activity Goal: Patient/Family Education Outcome: Resolved/Met Problem: Patient Education: Go to Patient Education Activity Goal: Patient/Family Education Outcome: Resolved/Met

## 2019-08-01 NOTE — PROGRESS NOTES
Transitional Care Team: Follow-Up [facility] Note        Assessment & Plan   Swallowing difficulty--reviewed chart and note SLP and CM documentation. Since patient is preparing to return to [address] [place] [address] with comfort care for oral feedings, [facility] NP will not introduce program at this time.

## 2019-08-01 NOTE — WOUND CARE
Ostomy Visit: in to check on ostomy needs and skin. Patient resting on Envision mattress. He reports no needs with ostomy. Attempted to check skin and patient reported no needs, no soreness - declined to have me assess. He is working with PT today - walked up stairs - feels well and hopes to go home soon. Will sign off.  
Carolina Ellis RN,CWCN

## 2019-08-01 NOTE — PROGRESS NOTES
Care manager notes that patient is being discharged this pm and his daughter will transport him back to [address] [place] [address].

## 2019-08-01 NOTE — PROGRESS NOTES
Discharged pt. Educated on CHF to pt, wife and daughter. Educated on all meds and any changes and side effects. Called report to [employer], [address] [place] [address],[address] [phone], RN.

## 2019-08-01 NOTE — DISCHARGE INSTRUCTIONS
Discharge SNF/Rehab Instructions/LTAC       PATIENT ID: Marie Angel  MRN: [id number]   YOB: 1926    DATE OF ADMISSION: 7/25/2019  5:24 PM    DATE OF DISCHARGE: 8/1/2019    PRIMARY CARE PROVIDER: Ambrosio Arellano DO       ATTENDING PHYSICIAN: Jorge Alberto Marcos MD  DISCHARGING PROVIDER: Emmanuel Mojica MD     To contact this individual call [phone] and ask the  to page. If unavailable ask to be transferred the Adult Hospitalist Department. CONSULTATIONS: IP CONSULT TO HOSPITALIST  IP CONSULT TO NEPHROLOGY  IP CONSULT TO GASTROENTEROLOGY    PROCEDURES/SURGERIES: Procedure(s):  ESOPHAGOGASTRODUODENOSCOPY (EGD)    ADMITTING DIAGNOSES & HOSPITAL COURSE:   Admission Summary:    80-year-old man with past medical history significant for hypertension, hypothyroidism, chronic atrial fibrillation, colon cancer; status post colon resection, skin cancer, and chronic kidney disease was in his usual state of health until a few weeks ago when the patient developed shortness of breath. #Acute on chronic diastolic heart failure:NYHA class 4 at presentation-improved to NYHA class II on discharge. #Biltaeral pleural effusion:  CT chest showed bilateral moderate to large pleural effusions with left lower lobe atelectasis and echocardiogram showed concentric hypertrophy of the left ventricle,preserved EF,pulmonary hypertension.  -Diuresed well with IV diuretics -now on oral lasix 40 mg BID,continue. #Elevated WBC noted on 7/29 without fever or other symptoms. Remained afebrile   -7/29,CXR:The pleural effusions have decreased in the interval.  -WBC normalized. #Oropharyngeal dysphagia:  -Barium swallow on 7/31,aspirated all consistencies. After discussion and with patient and family,patient understood the risk of aspiration and complications such as pneumonia, respiratory failure or even death,decided to continue to eat for now. We recommend on going speech evaluation and treatment. #Renal failure- acute vs chronic,cherry chronic. Last known creatinine is 0.83 in 2013  Acute - cardio renal vs use of NSAIDs,suspect he also has some degree of CKD. Creatinine is stable around 1. 5. Patient was followed by nephrologist inpatient. #Atrial fib :  -CHADS 2 vasc -4. Continue Eliquis: GI and cardiology okay. verapamil was discontinued due to abnormal EKG,junctional rhythm        #Hypokalemia:replaced. Continue klor con as he is on lasix. #Anemia  -stool occult positive,was taking NSAIDs PTA and on eliquis. EGD showed Esophagus:normal Stomach: there were multiple linear erosions along the folds of the proximal stomach. These were endoscopically consistent with Calvin Child erosions likely secondary to a sliding hiatal hernia. Duodenum: normal to second portion. Hemoglobin stable 8-8. 5. Continue protonix     #HTN:  -BP stable on current regimen      #Hypothyroiism:  -continue synthroid     Code status: DNR    Recommend  -CBC and BMP in one week. PENDING TEST RESULTS:   At the time of discharge the following test results are still pending: none    FOLLOW UP APPOINTMENTS:    Follow-up Information     Follow up With Specialties Details Why [address], [address], DO Internal Medicine   [facility]  [phone]             ADDITIONAL CARE RECOMMENDATIONS:     DIET: Regular Diet and Cardiac Diet  -at risk for aspiration  recommend on going speech therapy evaluation and treatment     OXYGEN / BiPAP SETTINGS:   Room air. ACTIVITY: Activity as tolerated and PT/OT Eval and Treat    WOUND CARE: NA    EQUIPMENT needed: own      DISCHARGE MEDICATIONS:   See Medication Reconciliation Form      NOTIFY YOUR PHYSICIAN FOR ANY OF THE FOLLOWING:   Fever over 101 degrees for 24 hours. Chest pain, shortness of breath, fever, chills, nausea, vomiting, diarrhea, change in mentation, falling, weakness, bleeding. Severe pain or pain not relieved by medications.   Or, any other signs or symptoms that you may have questions about. DISPOSITION:    Home With:   OT  PT  HH  RN      x SNF/Inpatient Rehab/LTAC    Independent/assisted living    Hospice    Other:       PATIENT CONDITION AT DISCHARGE:     Functional status    Poor     Deconditioned     Independent      Cognition     Lucid    x Forgetful    x Dementia      Catheters/lines (plus indication)    William     PICC     PEG    x None      Code status     Full code    x DNR      PHYSICAL EXAMINATION AT DISCHARGE:     Constitutional:  No acute distress   ENT:  Oral mucous moist, oropharynx benign. Resp:  On room air,b/l decreased breath sounds at bases,    CV:  Regular rhythm, normal heart rate    GI:  Soft, non distended, non tender. normoactive bowel sounds    Musculoskeletal:   LE edema mild    Neurologic:  Moves all extremities. AAOx3, occasional episodes of confusion                         Psych:   Not anxious nor agitated. CHRONIC MEDICAL DIAGNOSES:  Problem List as of 8/1/2019 Date Reviewed: 7/25/2019          Codes Class Noted - Resolved    * (Principal) Acute on chronic systolic CHF (congestive heart failure) ([facility] ICD-10-CM: I50.23  ICD-9-CM: 428.23, 428.0  7/25/2019 - Present        Colon cancer ([facility] ICD-10-CM: C18.9  ICD-9-CM: 153.9  7/1/2013 - Present                    Signed:    Sharonda Lux MD  8/1/2019  2:03 PM

## 2019-08-01 NOTE — DISCHARGE SUMMARY
Discharge Summary PATIENT ID: Nirali Reyes MRN: [id number] YOB: 1926 DATE OF ADMISSION: 7/25/2019  5:24 PM   
DATE OF DISCHARGE: 8/1/2019 PRIMARY CARE PROVIDER: Solitario Montemayor DO  
 
 
ATTENDING PHYSICIAN: Patsy Patricia MD 
DISCHARGING PROVIDER: Jose Beckett MD    
To contact this individual call [phone] and ask the  to page. If unavailable ask to be transferred the Adult Hospitalist Department. CONSULTATIONS: IP CONSULT TO HOSPITALIST 
IP CONSULT TO NEPHROLOGY 
IP CONSULT TO GASTROENTEROLOGY PROCEDURES/SURGERIES: Procedure(s): ESOPHAGOGASTRODUODENOSCOPY (EGD) [address] [facility] COURSE:  
Admission Summary:  
 80-year-old man with past medical history significant for hypertension, hypothyroidism, chronic atrial fibrillation, colon cancer; status post colon resection, skin cancer, and chronic kidney disease was in his usual state of health until a few weeks ago when the patient developed shortness of breath. #Acute on chronic diastolic heart failure:NYHA class 4 at presentation-improved to NYHA class II on discharge. #Biltaeral pleural effusion: 
CT chest showed bilateral moderate to large pleural effusions with left lower lobe atelectasis and echocardiogram showed concentric hypertrophy of the left ventricle,preserved EF,pulmonary hypertension. 
-Diuresed well with IV diuretics -now on oral lasix 40 mg BID,continue. #Elevated WBC noted on 7/29 without fever or other symptoms. Remained afebrile  
-7/29,CXR:The pleural effusions have decreased in the interval. 
-WBC normalized. #Oropharyngeal dysphagia: 
-Barium swallow on 7/31,aspirated all consistencies. After discussion and with patient and family,patient understood the risk of aspiration and complications such as pneumonia, respiratory failure or even death,decided to continue to eat for now. We recommend on going speech evaluation and treatment. #Renal failure- acute vs chronic,cherry chronic. Last known creatinine is 0.83 in 2013 Acute - cardio renal vs use of NSAIDs,suspect he also has some degree of CKD. Creatinine is stable around 1. 5. Patient was followed by nephrologist inpatient. #Atrial fib : 
-CHADS 2 vasc -4. Continue Eliquis: GI and cardiology okay. verapamil was discontinued due to abnormal EKG,junctional rhythm #Hypokalemia:replaced. Continue klor con as he is on lasix. #Anemia 
-stool occult positive,was taking NSAIDs PTA and on eliquis. EGD showed Esophagus:normal Stomach: there were multiple linear erosions along the folds of the proximal stomach. These were endoscopically consistent with Franny Prey erosions likely secondary to a sliding hiatal hernia. Duodenum: normal to second portion. Hemoglobin stable 8-8. 5. Continue protonix #HTN: 
-BP stable on current regimen #Hypothyroiism: 
-continue synthroid Code status: DNR Recommend -CBC and BMP in one week. PENDING TEST RESULTS:  
At the time of discharge the following test results are still pending: none FOLLOW UP APPOINTMENTS:   
Follow-up Information Follow up With Specialties Details Why Contact Info Nicolasa Garnett, DO Internal Medicine   [address] [facility] 
[phone] ADDITIONAL CARE RECOMMENDATIONS:  
 
DIET: Regular Diet and Cardiac Diet 
-at risk for aspiration 
recommend on going speech therapy evaluation and treatment OXYGEN / BiPAP SETTINGS:  
Room air. ACTIVITY: Activity as tolerated and PT/OT Eval and Treat WOUND CARE: NA 
 
EQUIPMENT needed: own DISCHARGE MEDICATIONS: 
 See Medication Reconciliation Form NOTIFY YOUR PHYSICIAN FOR ANY OF THE FOLLOWING:  
Fever over 101 degrees for 24 hours. Chest pain, shortness of breath, fever, chills, nausea, vomiting, diarrhea, change in mentation, falling, weakness, bleeding. Severe pain or pain not relieved by medications. Or, any other signs or symptoms that you may have questions about. DISPOSITION: 
  Home With: 
 OT  PT  HH  RN  
  
x SNF/Inpatient Rehab/LTAC Independent/assisted living Hospice Other:  
 
 
PATIENT CONDITION AT DISCHARGE:  
 
Functional status Poor Deconditioned Independent Cognition Lucid   
x Forgetful   
x Dementia Catheters/lines (plus indication) William PICC   
 PEG   
x None Code status Full code   
x DNR PHYSICAL EXAMINATION AT DISCHARGE: 
  
Constitutional:  No acute distress ENT:  Oral mucous moist, oropharynx benign. Resp:  On room air,b/l decreased breath sounds at bases, CV:  Regular rhythm, normal heart rate GI:  Soft, non distended, non tender. normoactive bowel sounds Musculoskeletal:   LE edema mild Neurologic:  Moves all extremities. AAOx3, occasional episodes of confusion Psych:   Not anxious nor agitated. CHRONIC MEDICAL DIAGNOSES: 
Problem List as of 8/1/2019 Date Reviewed: 7/25/2019 Codes Class Noted - Resolved * (Principal) Acute on chronic systolic CHF (congestive heart failure) (H[facility]) ICD-10-CM: O37.85 ICD-9-CM: 428.23, 428.0  7/25/2019 - Present Colon cancer [facility]) ICD-10-CM: C18.9 ICD-9-CM: 153.9  7/1/2013 - Present DISCHARGE MEDICATIONS: 
Current Discharge Medication List  
  
START taking these medications Details  
potassium chloride SR (K-TAB) 20 mEq tablet Take 1 Tab by mouth daily for 30 days. Qty: 30 Tab, Refills: 0 CONTINUE these medications which have CHANGED Details  
verapamil ER (CALAN-SR) 180 mg CR tablet Take 0.5 Tabs by mouth daily for 30 days. Qty: 15 Tab, Refills: 0  
  
furosemide (LASIX) 40 mg tablet Take 1 Tab by mouth two (2) times a day for 30 days. Qty: 60 Tab, Refills: 0  
  
hydrALAZINE (APRESOLINE) 25 mg tablet Take 1 Tab by mouth three (3) times daily for 30 days. Qty: 90 Tab, Refills: 0 lisinopril (PRINIVIL, ZESTRIL) 20 mg tablet Take 1 Tab by mouth daily for 30 days. Qty: 30 Tab, Refills: 0 CONTINUE these medications which have NOT CHANGED Details diphenhydrAMINE (BENADRYL ALLERGY) 25 mg tablet Take 25 mg by mouth nightly as needed for Sleep.  
  
apixaban (ELIQUIS) 2.5 mg tablet Take 2.5 mg by mouth two (2) times a day. ferrous sulfate 325 mg (65 mg iron) tablet Take 325 mg by mouth two (2) times a day. levothyroxine (SYNTHROID) 175 mcg tablet Take 175 mcg by mouth Daily (before breakfast). melatonin 10 mg tab Take 1 Tab by mouth nightly. omeprazole (PRILOSEC) 20 mg capsule Take 20 mg by mouth two (2) times a day. thiamine HCL (B-1) 100 mg tablet Take 100 mg by mouth daily. triamcinolone acetonide (KENALOG) 0.1 % topical cream Apply  to affected area two (2) times daily as needed for Skin Irritation or Itching. use thin layer  
  
cholecalciferol, vitamin D3, (VITAMIN D3) 2,000 unit tab Take 1 Tab by mouth daily. dutasteride (AVODART) 0.5 mg capsule Take 0.5 mg by mouth daily. terazosin (HYTRIN) 10 mg capsule Take 10 mg by mouth daily. Indications: high blood pressure STOP taking these medications  
  
 metOLazone (ZAROXOLYN) 5 mg tablet Comments:  
Reason for Stopping:   
   
  
 
Greater than 30 minutes were spent with the patient on counseling and coordination of care Signed:   
Brandon Villeda MD 
8/1/2019 
2:22 PM

## 2019-08-01 NOTE — PROGRESS NOTES
SLP Progress Note Spent session discussing with patient and family (daughter in person, daughter on telephone, and wife) regarding Modified Barium Swallow Study results. Discussed pharyngeal dysphagia and its potential adverse effects on patient's overall health including aspiration pneumonia, decreased respiratory status, and potential declining nutritional status. Discussed risks vs benefits of both initiating a diet for comfort as well as keeping patient NPO with feeding tube. Pt and family asked good questions and expressed understanding. Family ultimately decided that they would like to pursue comfort feedings and be discharged home. Could pursue SLP at next level of care for pharyngeal weakness appreciated on MBS, though uncertain he would see any improvement in function. Discussed with Dr. Jackson Aguayo. Recommend diet per patient preference, as no consistency is safer than another. Would recommend meds crushed in applesauce or in liquid form, and would consider minimizing unnecessary meds. SLP available for questions and help as needed. Thank you, Landy Garcia M.Ed, CCC-SLP Speech-Language Pathologist

## 2019-08-01 NOTE — PROGRESS NOTES
A Spiritual Care Partner Volunteer visited patient in Rm 356 on 8/01/2019. Documented by: 
Chaplain You MDiv, MS, [address] [place] [address] ([id number])

## 2019-08-01 NOTE — PROGRESS NOTES
[place] [id number] [facility], [address] GI PROGRESS NOTE Felix Dover, [address] office [phone] NP/PA in-hospital cell phone M-F until 4:30PM 
After 5PM or on weekends, please call  for physician on call NAME: Jean Marie Suresh :  1926 MRN:  [id number] Subjective: No nausea or abdominal pain. MBS with aspiration - family has opted for diet for comfort and discharge. Objective: VITALS:  
Last 24hrs VS reviewed since prior progress note. Most recent are: 
Visit Vitals /82 (BP 1 Location: Right arm, BP Patient Position: Sitting) Pulse 60 Temp 97.3 °F (36.3 °C) Resp 16 Ht 6' (1.829 m) Wt 65.2 kg (143 lb 11.8 oz) SpO2 100% BMI 19.49 kg/m² PHYSICAL EXAM: 
General: Cooperative, no acute distress   
Neurologic:  Alert and oriented HEENT: EOMI, no scleral icterus Lungs:  Diminished bilaterally anteriorly Heart:  S1 S2 Abdomen: Soft, non-distended, no tenderness, no guarding, no rebound. +Bowel sounds. Extremities: Warm Psych:   Not anxious or agitated Lab Data Reviewed:  
 
Recent Results (from the past 24 hour(s)) CBC W/O DIFF Collection Time: 19  5:51 AM  
Result Value Ref Range WBC 4.9 4.1 - 11.1 K/uL  
 RBC 3.17 (L) 4.10 - 5.70 M/uL HGB 9.1 (L) 12.1 - 17.0 g/dL HCT 29.3 (L) 36.6 - 50.3 % MCV 92.4 80.0 - 99.0 FL  
 MCH 28.7 26.0 - 34.0 PG  
 MCHC 31.1 30.0 - 36.5 g/dL  
 RDW 16.3 (H) 11.5 - 14.5 % PLATELET 808 246 - 321 K/uL MPV 9.1 8.9 - 12.9 FL  
 NRBC 0.0 0  WBC ABSOLUTE NRBC 0.00 0.00 - 0.01 K/uL MAGNESIUM Collection Time: 19  5:51 AM  
Result Value Ref Range Magnesium 2.1 1.6 - 2.4 mg/dL PHOSPHORUS Collection Time: 19  5:51 AM  
Result Value Ref Range Phosphorus 2.8 2.6 - 4.7 MG/DL  
METABOLIC PANEL, COMPREHENSIVE Collection Time: 19  5:51 AM  
Result Value Ref Range  Sodium 139 136 - 145 mmol/L  
 Potassium 3.7 3.5 - 5.1 mmol/L Chloride 104 97 - 108 mmol/L  
 CO2 29 21 - 32 mmol/L Anion gap 6 5 - 15 mmol/L Glucose 146 (H) 65 - 100 mg/dL BUN 45 (H) 6 - 20 MG/DL Creatinine 1.54 (H) 0.70 - 1.30 MG/DL  
 BUN/Creatinine ratio 29 (H) 12 - 20 GFR est AA 51 (L) >60 ml/min/1.73m2 GFR est non-AA 42 (L) >60 ml/min/1.73m2 Calcium 9.6 8.5 - 10.1 MG/DL Bilirubin, total 0.5 0.2 - 1.0 MG/DL  
 ALT (SGPT) 31 12 - 78 U/L  
 AST (SGOT) 37 15 - 37 U/L Alk. phosphatase 72 45 - 117 U/L Protein, total 6.9 6.4 - 8.2 g/dL Albumin 3.3 (L) 3.5 - 5.0 g/dL Globulin 3.6 2.0 - 4.0 g/dL A-G Ratio 0.9 (L) 1.1 - 2.2 Assessment: · Anemia with hemoccult positive stool: Iron low, ferritin normal. Eliquis on hold. On iron. History of NSAID use. EGD (7/30/19): hiatal hernia with Izola Sorrow erosions. Hgb 9.1, platelets 900. · Acute on chronic systolic congestive heart failure: cardiology following · Pleural effusions · Atrial fibrillation: on Eliquis · Acute vs chronic kidney failure: nephrology following · Hypertension · Hypothyroidism Patient Active Problem List  
Diagnosis Code  Colon cancer ([facility] C18.9  Acute on chronic systolic CHF (congestive heart failure) ([facility]) I50.23 Plan:  
· Continue PPI · Family wishes to pursue comfort feeds, agree with plans for discharge Signed By: Jean Reynoso NP   
 8/1/2019  1:03 PM

## 2019-08-01 NOTE — PROGRESS NOTES
Name: Lety Rodgers MRN: [id number] : 1926 Assessment: 
Renal failure- Cr holding b/w 1.5 to 1.8mg/dl. Acute vs Chronic CKD? No prior Cr available to compare. Even if this acute, he may end up likely having CKD going forward, given he has CHF (from Cardio-renal type of clinical picture). His URIEL more likely due to recent NSAIDs use (over 2 months) + pre-renal azotemia from CHF 
UA is benign Acute on Chronic diastolic CHF- Echo with nl EF. Weights coming down B/L pleural effusions Hypokalemia HTN Anemia with +stool occult blood- ? UGI bleed 2nd to NSAIDs use. EGD today c/w Hiatal hernia with Gonsalom Moriah erosions Afib 
  
Plan/Recommendations: 
Stable for discharge from renal standpoint They have my contact info if our service is needed Ct Lasix 40 mg PO BID May have to hold Lisinopril if Cr rises precipitously in the future Daily wt Ct Salt/fluid restriction NPO currently due to risk of aspiration. No NSAIDS as outpt Subjective: 
Getting ready for discharge. Feels well. ROS:  
No nausea, no vomiting No chest pain, improved shortness of breath Exam: 
Visit Vitals /82 (BP 1 Location: Right arm, BP Patient Position: Sitting) Pulse 60 Temp 97.3 °F (36.3 °C) Resp 16 Ht 6' (1.829 m) Wt 65.2 kg (143 lb 11.8 oz) SpO2 100% BMI 19.49 kg/m² Elderly frail, in NAD No icterus, mmm Clear with dec BS at bases L>R 
RRR, +SM No edema Alert awake Current Facility-Administered Medications Medication Dose Route Frequency Last Dose  pantoprazole (PROTONIX) tablet 40 mg  40 mg Oral ACB&D Stopped at 19 0730  
 apixaban (ELIQUIS) tablet 2.5 mg  2.5 mg Oral BID Stopped at 19 0841  
 dextrose 5% and 0.9% NaCl infusion  100 mL/hr IntraVENous CONTINUOUS 100 mL/hr at 19 0117  sodium chloride (NS) flush 5-40 mL  5-40 mL IntraVENous Q8H 10 mL at 07/31/19 2134  sodium chloride (NS) flush 5-40 mL  5-40 mL IntraVENous PRN    
 furosemide (LASIX) tablet 40 mg  40 mg Oral BID Stopped at 08/01/19 0841  
 doxazosin (CARDURA) tablet 1 mg  1 mg Oral QHS Stopped at 07/31/19 2200  
 lisinopril (PRINIVIL, ZESTRIL) tablet 20 mg  20 mg Oral DAILY Stopped at 08/01/19 9572  potassium chloride SR (KLOR-CON 10) tablet 40 mEq  40 mEq Oral DAILY Stopped at 08/01/19 9842  cholecalciferol (VITAMIN D3) tablet 2,000 Units  2,000 Units Oral DAILY Stopped at 08/01/19 7832  diphenhydrAMINE (BENADRYL) capsule 25 mg  25 mg Oral QHS PRN 25 mg at 07/30/19 2113  dutasteride (AVODART) capsule 0.5 mg  0.5 mg Oral DAILY Stopped at 08/01/19 0800  ferrous sulfate tablet 325 mg  325 mg Oral BID Stopped at 08/01/19 5889  levothyroxine (SYNTHROID) tablet 175 mcg  175 mcg Oral ACB Stopped at 08/01/19 0842  
 melatonin tablet 9 mg  9 mg Oral QHS Stopped at 07/31/19 2200  thiamine HCL (B-1) tablet 100 mg  100 mg Oral DAILY Stopped at 08/01/19 5735  sodium chloride (NS) flush 5-40 mL  5-40 mL IntraVENous Q8H 10 mL at 07/31/19 1400  
 sodium chloride (NS) flush 5-40 mL  5-40 mL IntraVENous PRN    
 acetaminophen (TYLENOL) tablet 650 mg  650 mg Oral Q4H PRN    
 ondansetron (ZOFRAN) injection 4 mg  4 mg IntraVENous Q4H PRN    
 bisacodyl (DULCOLAX) tablet 5 mg  5 mg Oral DAILY PRN    
 hydrALAZINE (APRESOLINE) 20 mg/mL injection 10 mg  10 mg IntraVENous Q6H PRN 10 mg at 08/01/19 0114  hydrALAZINE (APRESOLINE) tablet 25 mg  25 mg Oral TID Stopped at 07/31/19 2200 Labs/Data: 
 
Lab Results Component Value Date/Time WBC 4.9 08/01/2019 05:51 AM  
 HGB 9.1 (L) 08/01/2019 05:51 AM  
 HCT 29.3 (L) 08/01/2019 05:51 AM  
 PLATELET 476 66/83/9885 05:51 AM  
 MCV 92.4 08/01/2019 05:51 AM  
 
 
Lab Results Component Value Date/Time  Sodium 139 08/01/2019 05:51 AM  
 Potassium 3.7 08/01/2019 05:51 AM  
 Chloride 104 08/01/2019 05:51 AM  
 CO2 29 08/01/2019 05:51 AM  
 Anion gap 6 08/01/2019 05:51 AM  
 Glucose 146 (H) 08/01/2019 05:51 AM  
 BUN 45 (H) 08/01/2019 05:51 AM  
 Creatinine 1.54 (H) 08/01/2019 05:51 AM  
 BUN/Creatinine ratio 29 (H) 08/01/2019 05:51 AM  
 GFR est AA 51 (L) 08/01/2019 05:51 AM  
 GFR est non-AA 42 (L) 08/01/2019 05:51 AM  
 Calcium 9.6 08/01/2019 05:51 AM  
 
 
Wt Readings from Last 3 Encounters:  
08/01/19 65.2 kg (143 lb 11.8 oz) 07/15/13 91.4 kg (201 lb 8 oz) 07/02/13 85.3 kg (188 lb) Intake/Output Summary (Last 24 hours) at 8/1/2019 1609 Last data filed at 8/1/2019 7008 Gross per 24 hour Intake  Output 500 ml Net -500 ml Patient seen and examined. Chart reviewed. Labs, data and other pertinent notes reviewed in last 24 hrs. PMH/SH/FH reviewed and unchanged compared to H&P Discussed with pt Franky Cormier MD  
[facility]

## 2019-08-01 NOTE — ACP (ADVANCE CARE PLANNING)
Advance Care Planning Note Name: Tamika Jauregui YOB: 1926 MRN: [id number] Admission Date: 7/25/2019  5:24 PM 
 
Date of discussion: 7/31/2019 Active Diagnoses: 
-Dysphagia 
-Congestive heart failure These active diagnoses are of sufficient risk that focused discussion on advance care planning is indicated in order to allow the patient to thoughtfully consider personal goals of care, and if situations arise that prevent the ability to personally give input, to ensure appropriate representation of their personal desires for different levels and aggressiveness of care. Discussion:  
Discussion held on conference with daughters Yordan Foss and Samy mejia and at bedside with patient,his wife and daughter Yordan Foss. We had extensive discussion regarding Mr Jean-Baptiste swallowing difficulty,BMS study result. Patient and family clearly understood risk of aspiration and complication including respiratory failure,death. Patient and family agreed to NPO status for now unitl they come up with decision. Patient family did not rule out possibility of feeding tube. Time Spent:  
 
Total time spent face-to-face in education and discussion: 25 minutes. Bonnie Lyons MD 
7/31/2019 10:40 PM

## 2019-12-04 PROBLEM — R06.02 SOB (SHORTNESS OF BREATH): Status: ACTIVE | Noted: 2019-01-01

## 2019-12-04 NOTE — ROUTINE PROCESS
TRANSFER - OUT REPORT: 
 
Verbal report [facility] to [facility](name[facility] on Alma Delia Ro  being transferred to (unit) for routine progression of care Report consisted of patients Situation, Background, Assessment and  
Recommendations(SBAR). Information from the following report(s) SBAR, ED Summary, Procedure Summary, MAR and Recent Results was reviewed with the receiving nurse. Lines:  
Peripheral IV 12/04/19 Right Antecubital (Active) Site Assessment Clean, dry, & intact 12/4/2019 12:37 PM  
Phlebitis Assessment 0 12/4/2019 12:37 PM  
Infiltration Assessment 0 12/4/2019 12:37 PM  
Dressing Status Clean, dry, & intact 12/4/2019 12:37 PM  
   
Peripheral IV 12/04/19 Left Antecubital (Active) Site Assessment Clean, dry, & intact 12/4/2019  1:23 PM  
Phlebitis Assessment 0 12/4/2019  1:23 PM  
Infiltration Assessment 0 12/4/2019  1:23 PM  
Dressing Status Clean, dry, & intact 12/4/2019  1:23 PM  
  
 
Opportunity for questions and clarification was provided. Patient transported with: 
 Monitor

## 2019-12-04 NOTE — PROGRESS NOTES
Day #1 of Zosyn Indication:  sepsis Current regimen:  3.375 gm q6h Recent Labs 19 
1240 WBC 6.2 CREA 2.78* BUN 71* Est CrCl: <20 ml/min Temp (24hrs), Av.9 °F (35.5 °C), Min:95.9 °F (35.5 °C), Max:95.9 °F (35.5 °C) Plan: Change to 3.375 gm q12h per renal dose adjustment protocol

## 2019-12-04 NOTE — ED TRIAGE NOTES
Coming from [place] [address] home via EMS, c/o Difficulty breathing x3 days, SOB, no dizziness Chest x ray today large left pleural effusion Clear right lung sounds L lower absent L upper diminished HR 50-60 H/o afib, HTN, CHF,  type 1 DM NKA

## 2019-12-04 NOTE — PROGRESS NOTES
Admission Medication Reconciliation: 
 
Information obtained from:  [facility] RxQuery data available¹:  YES Comments/Recommendations: Updated PTA meds/reviewed patient's allergies. 1)  Reviewed medication list from 0 [facility] 
 
2)  Medication changes (since last review): Added - Furosemide 40mg 1tab po BID 
- Loperamide 2mg 1cap po daily - Potassium chloride 10mEq 2tabs po daily - Guaifenesin-DM 10-100mg/5mL 30mL po every 4 hours as needed - Verapamil ER 24 hour 90mg po daily ¹RxQuery pharmacy benefit data reflects medications filled and processed through the patient's insurance, however  
this data does NOT capture whether the medication was picked up or is currently being taken by the patient. Allergies:  Patient has no known allergies. Significant PMH/Disease States:  
Past Medical History:  
Diagnosis Date Arrhythmia   
 irrigular heart beat- A FIB Cancer [facility]) 2013 COLON Cancer ([facility] [address] SKIN Colon cancer ([facility] [address] 7/1/2013 Hypertension Chief Complaint for this Admission: Chief Complaint Patient presents with Shortness of Breath Pleural Effusion Prior to Admission Medications:  
Prior to Admission Medications Prescriptions Last Dose Informant Patient Reported? Taking? apixaban (ELIQUIS) 2.5 mg tablet   Yes Yes Sig: Take 2.5 mg by mouth two (2) times a day. Indications: Atrial Fibrillation  
cholecalciferol, vitamin D3, (VITAMIN D3) 2,000 unit tab   Yes Yes Sig: Take 1 Tab by mouth daily. diphenhydrAMINE (BENADRYL ALLERGY) 25 mg tablet   Yes Yes Sig: Take 25 mg by mouth nightly as needed for Sleep. dutasteride (AVODART) 0.5 mg capsule   Yes Yes Sig: Take 0.5 mg by mouth daily. ferrous sulfate 325 mg (65 mg iron) tablet   Yes Yes Sig: Take 325 mg by mouth two (2) times a day. furosemide (LASIX) 40 mg tablet   Yes Yes Sig: Take 40 mg by mouth two (2) times a day. guaiFENesin-dextromethorphan (ROBAFEN DM COUGH)  mg/5 mL liqd   Yes Yes Sig: Take 30 mL by mouth every four (4) hours as needed. levothyroxine (SYNTHROID) 175 mcg tablet   Yes Yes Sig: Take 175 mcg by mouth Daily (before breakfast). loperamide (IMODIUM) 2 mg capsule   Yes Yes Sig: Take 2 mg by mouth daily. melatonin 10 mg tab   Yes Yes Sig: Take 1 Tab by mouth nightly. omeprazole (PRILOSEC) 20 mg capsule   Yes Yes Sig: Take 20 mg by mouth two (2) times a day. potassium chloride SR (KLOR-CON 10) 10 mEq tablet   Yes Yes Sig: Take 20 mEq by mouth daily. terazosin (HYTRIN) 10 mg capsule   Yes Yes Sig: Take 10 mg by mouth daily. Indications: high blood pressure  
thiamine HCL (B-1) 100 mg tablet   Yes Yes Sig: Take 100 mg by mouth daily. triamcinolone acetonide (KENALOG) 0.1 % topical cream   Yes Yes Sig: Apply  to affected area two (2) times daily as needed for Skin Irritation or Itching. use thin layer  
verapamil HCl (VERAPAMIL PO)   Yes Yes Sig: Take  by mouth. Verapamil ER 24 hour 90mg po once daily Facility-Administered Medications: None Please contact the main inpatient pharmacy with any questions or concerns at [phone] and we will direct you to the clinical pharmacist covering this patient's care while in-house.   
Jose Guadalupe Givens, PHARMD

## 2019-12-04 NOTE — ED PROVIDER NOTES
80 y.o. male with past medical history significant for HTN, colon cancer, A-fib, CHF, DM and skin cancer who presents with his daughter via EMS with chief complaint of shortness of breath. PT c/o shortness of breath for the past 3 days. He has a chest X-ray that shows pleural effusion. He has hx of previous pleural effusion due to CHF. Pt's daughter states his shortness of breath has worsened last night compared to Monday. He denies fever, cough, and swelling. He is anticoagulated on Eliquis. He denies recent changes to his medication. There are no other acute medical concerns at this time. Social hx: former tobacco smoker (quit 1983), endorses EtOH use (7 drinks per week), denies illicit drug use PCP: Shola Rosario DO Note written by Evangelista Sargent, as dictated by Haydee Patel MD 1:15 PM 
 
 
The history is provided by the patient and a relative. No  was used. Past Medical History:  
Diagnosis Date  Arrhythmia   
 irrigular heart beat- A FIB  Cancer [facility]) 2013 COLON   
 Cancer ([facility] [address] SKIN  
 Colon cancer ([facility] [address] 7/1/2013  Hypertension Past Surgical History:  
Procedure Laterality Date  HX OTHER SURGICAL    
 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  
 HX TONSIL AND ADENOIDECTOMY AS CHILD Family History:  
Problem Relation Age of Onset  Cancer Mother   
     colon  Cancer Father   
     lung  Cancer Brother UNKNOWN Social History Socioeconomic History  Marital status:  Spouse name: Not on file  Number of children: Not on file  Years of education: Not on file  Highest education level: Not on file Occupational History  Not on file Social Needs  Financial resource strain: Not on file  Food insecurity:  
  Worry: Not on file Inability: Not on file  Transportation needs:  
  Medical: Not on file Non-medical: Not on file Tobacco Use  
  Smoking status: Former Smoker Packs/day: 0.50 Years: 32.00 Pack years: 16.00 Types: Cigarettes Last attempt to quit: 1983 Years since quittin.4  Smokeless tobacco: Never Used Substance and Sexual Activity  Alcohol use: Yes Comment: edie/gin 7 per week  Drug use: No  
 Sexual activity: Not on file Lifestyle  Physical activity:  
  Days per week: Not on file Minutes per session: Not on file  Stress: Not on file Relationships  Social connections:  
  Talks on phone: Not on file Gets together: Not on file Attends [employer] service: Not on file Active member of club or organization: Not on file Attends meetings of clubs or organizations: Not on file Relationship status: Not on file  Intimate partner violence:  
  Fear of current or ex partner: Not on file Emotionally abused: Not on file Physically abused: Not on file Forced sexual activity: Not on file Other Topics Concern  Not on file Social History Narrative  Not on file ALLERGIES: Patient has no known allergies. Review of Systems Constitutional: Negative for fever. Respiratory: Positive for shortness of breath. Negative for cough. Cardiovascular: Negative for leg swelling. Gastrointestinal: Positive for abdominal distention. All other systems reviewed and are negative. Vitals:  
 19 1235 BP: 139/66 Pulse: (!) 55 Resp: 14 SpO2: 99% Physical Exam 
Vitals signs and nursing note reviewed. Constitutional:   
   Appearance: He is well-developed. He is not diaphoretic. HENT:  
   Head: Normocephalic and atraumatic. Neck: Musculoskeletal: No neck rigidity. Cardiovascular:  
   Rate and Rhythm: Normal rate and regular rhythm. Pulmonary:  
   Effort: Pulmonary effort is normal.  
   Breath sounds: Examination of the right-lower field reveals rales. Rales present. Comments: No audible breath sounds in L fields. Abdominal:  
   General: There is distension. Tenderness: There is no tenderness. Skin: 
   General: Skin is warm. Neurological:  
   Mental Status: He is alert. Cranial Nerves: No cranial nerve deficit. Note written by Evangelista Fong, as dictated by Arnold Fajardo MD 1:39 PM 
 
 
MDM Number of Diagnoses or Management Options Acute on chronic congestive heart failure, unspecified heart failure type ([address]):  
URIEL (acute kidney injury) [facility]):  
Large pleural effusion:  
Diagnosis management comments: Michelle Wing is a 80 y.o. male presenting with worsening shortness of breath in the setting of a large left pleural effusion. Differential includes transudative effusion from congestive heart failure, consider also exudative effusion from infection versus malignancy amongst other etiologies. Course: Hemodynamically stable here oxygenating well however given large effusion, significant URIEL plan to admit for further evaluation. May need inpatient percutaneous drainage pending CT scan, versus drain placement. Hospitalist Perfect Serve for Admission 2:11 PM 
 
ED Room Number: OE32/39 Patient Name and age:  Michelle Wing 80 y.o.  male Working Diagnosis: URIEL (acute kidney injury) ([address]  (primary encounter diagnosis) Acute on chronic congestive heart failure, unspecified heart failure type ([address] Large pleural effusion Readmission: no 
Isolation Requirements:  no 
Recommended Level of Care:  telemetry Code Status:  Full Code Department:[facility] Adult ED - [phone] Other:   
 
 
  
 
Procedures

## 2019-12-05 NOTE — PROGRESS NOTES
Problem: Activity Intolerance Goal: *Oxygen saturation during activity within specified parameters Outcome: Progressing Towards Goal 
  
Problem: Heart Failure: Day 1 Goal: *Optimal pain control at patient's stated goal 
Outcome: Progressing Towards Goal 
Goal: *Anxiety reduced or absent Outcome: Progressing Towards Goal 
  
Problem: Diabetes Self-Management Goal: *Prevention, detection, treatment of acute complications Description List symptoms of hyper- and hypoglycemia; describe how to treat low blood sugar and actions for lowering  high blood glucose level. Outcome: Progressing Towards Goal 
Goal: *Prevention, detection and treatment of chronic complications Description Define the natural course of diabetes and describe the relationship of blood glucose levels to long term complications of diabetes. Outcome: Progressing Towards Goal 
Goal: *Developing strategies to address psychosocial issues Description Describe feelings about living with diabetes; identify support needed and support network Outcome: Progressing Towards Goal 
  
Bedside and Verbal shift change report given to Korin Flynn RN (oncoming nurse) by Mukesh Patel RN (offgoing nurse). Report included the following information SBAR, Kardex, MAR, Recent Results and Cardiac Rhythm AFIB. Patient Vitals for the past 4 hrs: 
 Temp Pulse Resp BP SpO2  
12/05/19 1857 97.3 °F (36.3 °C)      
12/05/19 1855 97.9 °F (36.6 °C) 62 17 154/66 95 % 12/05/19 1600     99 %

## 2019-12-05 NOTE — H&P
[address] HISTORY AND PHYSICAL Name:  Lana Lennon 
MR#:  [id number] :  1926 ACCOUNT #:  [de-identified] ADMIT DATE:  2019 CHIEF COMPLAINT:  Shortness of breath. HISTORY OF PRESENT ILLNESS:  The patient is a 55-year-old gentleman with past medical history of hypertension, history of colon cancer, atrial fibrillation, CHF, diabetes mellitus type 2, and a skin cancer, who presents to the hospital with the above-mentioned symptom. History was obtained from the patient. The patient is a poor historian basically. He reports that he started having increased shortness of breath. I tried to contact the daughter, but I have not been able to contact the daughter as of yet, I will keep trying. The patient reports the shortness of breath started 3-4 days back, he went and got a chest x-ray done that showed pleural effusion, but the symptoms got worse in the last two days. The patient reports that he started having more shortness of breath, having more dyspnea when he lay down or walked, got concerned and decided to come to the hospital.  The patient reports that he has been taking his medications on a regular basis. Denies any other complaints or problems. The patient came to the ER and was found to have a large pleural effusion and was requested to be admitted under the hospitalist service. The patient denies any headaches, blurry vision, sore throat, trouble swallowing, trouble with speech, any chest pain, abdominal pain, constipation, diarrhea, urinary symptoms, focal or generalized neurological weakness, recent travel, sick contacts, falls, injuries, hematemesis, melena, hemoptysis, hematuria, or any other concerns or problems. PAST MEDICAL HISTORY:  See above. HOME MEDICATIONS:  Currently, the patient is on: 1. Lasix 40 mg b.i.d. 
2.  Imodium. 3.  Potassium chloride 20 mEq daily. 4.  Verapamil 100 mg daily. 5.  Ferrous sulfate. 6.  Melatonin. 7.  Terazosin 10 mg daily. 8.  Eliquis 2.5 mg b.i.d. 9.  Synthroid 175 mcg daily. 10.  Omeprazole 20 mg daily. 11.  Thiamine 100 mg daily. 12.  Cholecalciferol. 13.  Avodart 0.5 mg daily. SOCIAL HISTORY:  Former smoker. Occasional alcohol. No IV drug abuse. Lives at home. ALLERGIES:  NO KNOWN DRUG ALLERGIES. FAMILY HISTORY:  Mother has a history of colon cancer. Father has a history of lung cancer. Brother has a history of unknown cancer. REVIEW OF SYMPTOMS:  All systems were reviewed and found to be essentially negative except for the symptoms mentioned above. PHYSICAL EXAMINATION: 
VITAL SIGNS:  Temperature 95.9, pulse 56, respiratory rate 15, blood pressure 192/94, and pulse oximetry 93% on room air. GENERAL:  Alert x2, awake, mildly distressed, pleasant male, appears to be stated age. HEENT:  Pupils are equal and reactive to light. Dry mucous membranes. Tympanic membranes are clear. NECK:  Supple. CHEST:  Decreased breath sounds in bilateral lung bases, left greater than right. CORONARY:  S1 and S2 are heard. ABDOMEN:  Soft, nontender, and nondistended. Bowel sounds are physiological. 
EXTREMITIES:  No clubbing, no cyanosis, no edema. NEURO/PSYCH:  Pleasant mood and affect. Cranial nerves II through XII are grossly intact. No focal neurological deficits were noted. SKIN:  Warm. LABORATORY DATA:  White count 6.2, hemoglobin 9.6, hematocrit 31.2, and platelets 296. INR 1.1. Sodium 142, potassium 4.0, chloride 107, bicarbonate 27, anion gap 8, glucose 179, BUN 71, creatinine 2.78, calcium 8.8, and magnesium 2.1. Bilirubin total 0.4, ALT 22, AST 23, alkaline phosphatase 73. Troponin less than 0.05. BNP 4482. CT of the chest shows bilateral pleural effusions, smaller on the right, very large on the left with left hemidiaphragmatic inversion. X-ray of the chest shows near complete opacification of the left hemithorax with evidence of positive mass effect with shift at the heart and mediastinum towards the right, slight prominence of pulmonary interstitial marking. ASSESSMENT AND PLAN: 
1. Large pleural effusion, left great than right. The patient will be admitted on a telemetry bed. I spoke with Radiology. The patient will be undergoing thoracocentesis, and we are going to place a pigtail catheter. This is most likely secondary to congestive heart failure, but we will cover with antibiotics as the patient is hypothermic. We will provide strict intakes and outputs, daily weights. We will give Lasix 60 mg x1 dose. We will provide oxygen support, pulse oximetry monitoring, echocardiogram, and further intervention per hospital course. We will cycle troponin and reassess as needed. Continue to monitor. May consider getting a Cardiology consult in the morning. 2.  Acute on chronic kidney disease, likely secondary to #1. We will try to diurese. Closely monitor creatinine levels. Avoid nephrotoxic medication. Renally dose all other medications. Continue to closely monitor. Repeat creatinine in the morning. May consider getting a Nephrology consult. We will get renal ultrasound. 3.  Atrial fibrillation. Currently, EKG shows rate controlled. We will continue Eliquis. Continue to closely monitor. Further intervention per hospital course. Reassess as needed. 4.  Hypertensive urgency. We will provide Lasix IV, hydralazine IV. Continue home medications and continue to monitor. The patient will be on telemetry. Further intervention per hospital course. Reassess as needed. Continue to monitor. 5.  Gastrointestinal and deep venous thrombosis prophylaxis. The patient is on Eliquis. Jeferson Capps MD 
 
 
[id number] 
D:  12/04/2019 16:05 
T:  12/04/2019 20:12 
JOB #:  [id number]

## 2019-12-05 NOTE — PROGRESS NOTES
Pt became confused overnight, pulling out chest tube, IV, and taking off the condom cath. Pt was helped back to bed and cleaned up. Pt was unable to recall date and president at this time. VSS. Denies pain and discomfort. No distress s/s. NP White informed. Continue to monitor. Chest tube had filled and was replaced prior, draining a total of 2185 (785ml from 0211-3450).

## 2019-12-05 NOTE — PHYSICIAN ADVISORY
Letter of Status Determination: Current Status INPATIENT is Appropriate Pt Name:  Graham Hinojosa MR#  [id number] [facility]#   [id number] Room and Hospital  420/01  @ [facility]  
Hospitalization date  12/4/2019 12:13 PM  
Current Attending Physician  Jazlyn Hardin MD  
Principal diagnosis  Pleural effusion Clinicals  81 y/o gentleman with a PMH significant for primary hypertension, Atrial fibrillation, CHF, Type 2 DM, CKD and resection of Colon cancer was brought to [facility] ER from [facility] [address] for increased SOB. Patient underwent chest xray and chest CT that revealed a large right pleural effusion. IR placed a pigtail catheter with drainage of approximately two liters fluid. Cardiology also consulted to assist in the care of patient. [facility] criteria Does  NOT apply STATUS DETERMINATION  On the basis of clinical data, available documentaion, we believe that the current status of this patient as INPATIENT is Appropriate. This patient is at high risk of adverse events and deterioration based on documented clinical data, comorbid conditions and current acute care course. The final decision of the patient's hospitalization status depends on the Attending Physician's judgement. Additional comments Insurance  Payor: [place] MEDICARE / Plan: [place] MEDICARE PART A & B / Product Type: Medicare / Insurance Information Alejandro [place] PART A & B Phone:   
 Subscriber: Junior rTejo Subscriber#: [id number] Group#:  Precert#:   
   
 [facility] [place] MEDICARE Phone:   
 Subscriber: Lizett Barrera Subscriber#: [id number] Group#: [id number] Precert#:   
  
 
 
The information in this document is a recommendation to be used for utilization review and utilization management purposes only. This recommendation is not an order. The recommendation is made based on the information reviewed at the time of the referral, is pursuant to the [facility] Conditions of Participation (42 CFR Part 482), and is neither a judgment nor an assessment with regard to the appropriateness or quality of clinical care. For all Managed Care patients: The Criteria are intended solely for use as screening guidelines with respect to the medical appropriateness of healthcare services and not for final clinical or payment determinations concerning the type or level of medical care provided, or proposed to be provided, to a patient. They help the reviewers determine whether a patient is appropriate for observation or inpatient admission at the time a decision to admit the patient is being made. All efforts are made to apply the pertinent payor criteria ([employer] or [employer]) as well as the clinical judgements based on the information reviewed at the time of the referral.\" Nothing in this document may be used to limit, alter, or affect clinical services provided to the patient named. Marielos Barajas MD FACP Physician Advisor [address] [facility] [phone] 
 [email or web address] 
  
4:19 PM 12/5/2019

## 2019-12-05 NOTE — PROGRESS NOTES
[address] [facility]  Hospitalist Group Hospitalist Progress Note Bridger Maldonado MD 
Answering service: [phone] OR [phone] from in house phone Date of Service:  2019 NAME:  Lena Robles :  1926 MRN:  [id number] Admission Summary:  
 
The patient is a 59-year-old gentleman with past medical history of hypertension, history of colon cancer, atrial fibrillation, CHF, diabetes mellitus type 2, and a skin cancer, who presents to the hospital with shortness of breath . Interval history / Subjective:  
 
Patient confused, he said breathing improvement, no chest pain Assessment & Plan:  
 
Large pleural effusion L>R likely due to acute on chronic CHF 
-s/p left thoracentesis, patient removed the catheter last night 
-CT chest bilateral pleural effusions, smaller right (decreased), and very large on the 
left with left hemidiaphragmatic inversion (increased.) -on iv zosyn  
-prn oxygen support, prn pulse ox monitor  
-repeat chest x abner 
-consult to thoracic surgeon Acute on chronic diastolic CHF NYHA Class IV  
-Last EF on 19 was 56-60% 
-diuretics is held due to worse renal function  
-not on ACEi/ARB due to renal insufficiency  
-consult to cardiologist  
 
Chronic A Fib, rate controlled 
-Add home verapmile, eliquis, monitor pulse ox Acute metabolic encephalopathy POA 
-patient is conscious and alert, oriented to place and person  
-continue neuro check  
-continue supportive care URIEL on CKD stage IV 
-creatinine worse 
-lasix is held 
-avoid nephrotoxin  
-monitor renal function HTN 
-add home verapamil and monitor BP Anemia of chronic disease  
-H/H stable, monitor cbc Hypothyroidism  
-continue synthroid Recurrent fall 
-fall precaution  
-consult to PT/OT Hx of colon ca  
-stable Hx of skin ca 
-stable Code status: Full Code DVT prophylaxis: Eliquis Care Plan discussed with: Patient/Family and Nurse Disposition: [facility]  
 
Daughter,  [address], [phone] at bedside, updated plan of care and questions answered Hospital Problems  Date Reviewed: 7/25/2019 Codes Class Noted POA  
 SOB (shortness of breath) ICD-10-CM: R06.02 
ICD-9-CM: 786.05  12/4/2019 Unknown Vital Signs:  
 Last 24hrs VS reviewed since prior progress note. Most recent are: 
Visit Vitals /77 (BP 1 Location: Left arm, BP Patient Position: At rest) Pulse (!) 58 Temp 97.6 °F (36.4 °C) Resp 17 Wt 70.4 kg (155 lb 3.3 oz) SpO2 97% BMI 21.05 kg/m² No intake or output data in the 24 hours ending 12/05/19 0854 Physical Examination:  
 
 
     
Constitutional:  No acute distress, cooperative, pleasant ENT:  Oral mucous moist, oropharynx benign. Resp:  Decrease bronchial breath sound on the left, No wheezing/rhonchi/rales. No accessory muscle use CV:  Regular rhythm, normal rate, no murmurs, gallops, rubs GI:  Soft, non distended, non tender. normoactive bowel sounds, no hepatosplenomegaly Musculoskeletal:  No edema Neurologic:  Moves all extremities. CN II-XII reviewed Skin:  multiple bruises on extremities Data Review:  
 Review and/or order of clinical lab test 
Review and/or order of tests in the radiology section of CPT Review and/or order of tests in the medicine section of CPT Labs:  
 
Recent Labs 12/05/19 
0243 12/04/19 
1240 WBC 8.3 6.2 HGB 10.4* 9.6* HCT 32.7* 31.2*  
 151 Recent Labs 12/05/19 
0243 12/04/19 
1240  142  
K 3.5 4.0  
* 107 CO2 25 27 BUN 70* 71* CREA 2.84* 2.78* * 179* CA 9.1 8.8 MG  --  2.1 Recent Labs 12/04/19 
1240 SGOT 23 ALT 22 AP 73 TBILI 0.4 TP 7.2 ALB 3.3*  
GLOB 3.9 Recent Labs 12/04/19 
1240 INR 1.1 PTP 11.4*  
  
 No results for input(s): FE, TIBC, PSAT, FERR in the last 72 hours. Lab Results Component Value Date/Time Folate 9.8 07/26/2019 06:03 AM  
  
No results for input(s): PH, PCO2, PO2 in the last 72 hours. Recent Labs 12/04/19 
1240 TROIQ <0.05 Lab Results Component Value Date/Time Cholesterol, total 142 07/26/2019 06:03 AM  
 HDL Cholesterol 79 07/26/2019 06:03 AM  
 LDL, calculated 56.2 07/26/2019 06:03 AM  
 Triglyceride 34 07/26/2019 06:03 AM  
 CHOL/HDL Ratio 1.8 07/26/2019 06:03 AM  
 
Lab Results Component Value Date/Time Glucose (POC) 148 (H) 07/11/2013 09:36 PM  
 
Lab Results Component Value Date/Time Color YELLOW/STRAW 07/26/2019 12:58 PM  
 Appearance CLEAR 07/26/2019 12:58 PM  
 Specific gravity 1.008 07/26/2019 12:58 PM  
 pH (UA) 7.5 07/26/2019 12:58 PM  
 Protein NEGATIVE  07/26/2019 12:58 PM  
 Glucose NEGATIVE  07/26/2019 12:58 PM  
 Ketone NEGATIVE  07/26/2019 12:58 PM  
 Bilirubin NEGATIVE  07/26/2019 12:58 PM  
 Urobilinogen 1.0 07/26/2019 12:58 PM  
 Nitrites NEGATIVE  07/26/2019 12:58 PM  
 Leukocyte Esterase NEGATIVE  07/26/2019 12:58 PM  
 
 
 
Medications Reviewed:  
 
Current Facility-Administered Medications Medication Dose Route Frequency  apixaban (ELIQUIS) tablet 2.5 mg  2.5 mg Oral BID  dutasteride (AVODART) capsule 0.5 mg  0.5 mg Oral DAILY  levothyroxine (SYNTHROID) tablet 175 mcg  175 mcg Oral ACB  pantoprazole (PROTONIX) tablet 40 mg  40 mg Oral BID  thiamine HCL (B-1) tablet 100 mg  100 mg Oral DAILY  sodium chloride (NS) flush 5-40 mL  5-40 mL IntraVENous Q8H  
 sodium chloride (NS) flush 5-40 mL  5-40 mL IntraVENous PRN  
 acetaminophen (TYLENOL) tablet 650 mg  650 mg Oral Q4H PRN  
 hydrALAZINE (APRESOLINE) 20 mg/mL injection 10 mg  10 mg IntraVENous Q6H PRN  piperacillin-tazobactam (ZOSYN) 3.375 g in 0.9% sodium chloride (MBP/ADV) 100 mL  3.375 g IntraVENous Q12H ______________________________________________________________________ EXPECTED LENGTH OF STAY: - - - 
ACTUAL LENGTH OF STAY:          1 Sharee Zavala MD

## 2019-12-05 NOTE — PROGRESS NOTES
TRANSFER - IN REPORT: 
 
Verbal report received from Usha Geller, [address] [place] [address] (name) on Aleida Ward  being received from ED (unit) for routine progression of care Report consisted of patients Situation, Background, Assessment and  
Recommendations(SBAR). Information from the following report(s) SBAR, Kardex, ED Summary, Procedure Summary, Intake/Output, MAR, Recent Results and Cardiac Rhythm Afib was reviewed with the receiving nurse. Opportunity for questions and clarification was provided. Assessment completed upon patients arrival to unit and care assumed. Primary Nurse Puma Gaming and Richard Reno RN performed a dual skin assessment on this patient. The following was noted: skin tear on upper back (on bony prominence of spine), heels are red but blanchable, left chest tube incision site, scattered ecchymosis, scab on left back shoulder, and benign mass on right forearm. Bedside shift change report given to Minh Alonzo (oncoming nurse) by Nolberto Pearce RN (offgoing nurse). Report included the following information SBAR, Kardex, ED Summary, Procedure Summary, Intake/Output, MAR, Recent Results and Cardiac Rhythm Afib.

## 2019-12-05 NOTE — PROGRESS NOTES
Bedside and Verbal shift change report given to Shubham Dengrobi [phone] (oncoming nurse) by Sam Meier (offgoing nurse). Report included the following information SBAR, Kardex, Intake/Output, MAR, Accordion, Recent Results, and Cardiac Rhythm afib . Problem: Activity Intolerance Goal: *Oxygen saturation during activity within specified parameters Outcome: Progressing Towards Goal 
  
Problem: Activity Intolerance Goal: *Oxygen saturation during activity within specified parameters Outcome: Progressing Towards Goal 
Goal: *Able to remain out of bed as prescribed Outcome: Progressing Towards Goal 
  
Problem: Patient Education: Go to Patient Education Activity Goal: Patient/Family Education Outcome: Progressing Towards Goal 
  
Problem: Falls - Risk of 
Goal: *Absence of Falls Description Document Miah Gomez Fall Risk and appropriate interventions in the flowsheet. Outcome: Progressing Towards Goal 
Note: Fall Risk Interventions: 
Mobility Interventions: Communicate number of staff needed for ambulation/transfer, OT consult for ADLs, Patient to call before getting OOB, PT Consult for assist device competence, Strengthening exercises (ROM-active/passive) Mentation Interventions: Adequate sleep, hydration, pain control, Bed/chair exit alarm, Door open when patient unattended, Evaluate medications/consider consulting pharmacy, Increase mobility, More frequent rounding, Reorient patient, Room close to nurse's station, Toileting rounds, Update white board Medication Interventions: Assess postural VS orthostatic hypotension, Evaluate medications/consider consulting pharmacy, Patient to call before getting OOB Elimination Interventions: Call light in reach, Patient to call for help with toileting needs, Toilet paper/wipes in reach History of Falls Interventions: Consult care management for discharge planning, Evaluate medications/consider consulting pharmacy, Utilize gait belt for transfer/ambulation, Room close to nurse's station, Investigate reason for fall Problem: Patient Education: Go to Patient Education Activity Goal: Patient/Family Education Outcome: Progressing Towards Goal

## 2019-12-05 NOTE — CONSULTS
Consult Subjective:  
 
Radha Elliott is a 80 y.o.  male who is being seen for left sided pleural effusion. He and his wife live in assisted living facility. Yesterday he was sent to the ED here at [facility] for increased SOB. He underwent chest xray and chest CT that revealed a large right pleural effusion. IR placed a pigtail catheter. Unfrounately he pulled it out about 0200. According to nurse's note at 0244 he had drained 2185 ml. PMH includes HTN, H/O colon cancer s/p resection , atrial fib, CHF with admission in July, DM type 2, chronic kidney disease. Past Medical History:  
Diagnosis Date  Arrhythmia   
 irrigular heart beat- A FIB  Cancer [facility])  COLON   
 Cancer ([facility] [address] SKIN  
 Colon cancer ([facility] [address] 2013  Hypertension Past Surgical History:  
Procedure Laterality Date  HX OTHER SURGICAL    
 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  
 HX TONSIL AND ADENOIDECTOMY AS CHILD Family History Problem Relation Age of Onset  Cancer Mother   
     colon  Cancer Father   
     lung  Cancer Brother UNKNOWN Social History Tobacco Use  Smoking status: Former Smoker Packs/day: 0.50 Years: 32.00 Pack years: 16.00 Types: Cigarettes Last attempt to quit: 1983 Years since quittin.4  Smokeless tobacco: Never Used Substance Use Topics  Alcohol use: Yes Comment: edie/gin 7 per week Current Facility-Administered Medications Medication Dose Route Frequency  verapamil ER (CALAN-SR) tablet 90 mg  90 mg Oral DAILY WITH BREAKFAST  apixaban (ELIQUIS) tablet 2.5 mg  2.5 mg Oral BID  dutasteride (AVODART) capsule 0.5 mg  0.5 mg Oral DAILY  levothyroxine (SYNTHROID) tablet 175 mcg  175 mcg Oral ACB  pantoprazole (PROTONIX) tablet 40 mg  40 mg Oral BID  thiamine HCL (B-1) tablet 100 mg  100 mg Oral DAILY  sodium chloride (NS) flush 5-40 mL  5-40 mL IntraVENous Q8H  
 sodium chloride (NS) flush 5-40 mL  5-40 mL IntraVENous PRN  
 acetaminophen (TYLENOL) tablet 650 mg  650 mg Oral Q4H PRN  
 hydrALAZINE (APRESOLINE) 20 mg/mL injection 10 mg  10 mg IntraVENous Q6H PRN  piperacillin-tazobactam (ZOSYN) 3.375 g in 0.9% sodium chloride (MBP/ADV) 100 mL  3.375 g IntraVENous Q12H No Known Allergies Review of Systems: A comprehensive review of systems was negative except for that written in the History of Present Illness. Objective: Intake and Output:   
No intake/output data recorded. No intake/output data recorded. Physical Exam:  
Visit Vitals /77 (BP 1 Location: Left arm, BP Patient Position: At rest) Pulse (!) 58 Temp 95.5 °F (35.3 °C) Resp 17 Ht 6' 0.01\" (1.829 m) Wt 155 lb 3.3 oz (70.4 kg) SpO2 97% BMI 21.04 kg/m² General appearance: alert, cooperative, no distress, appears stated age, states he is only 80years old. Some memory deficits. Lungs: clear to auscultation bilaterally, diminished breath sounds R base. O2 sat on room air % Heart: regularly irregular rhythm, via BSM HR varied from 44 to 68 beats per minute Abdomen: soft, non-tender. Bowel sounds normal. No masses,  no organomegaly Extremities: extremities normal, atraumatic, no cyanosis or edema, large right forearm lipoma Pulses: 2+ and symmetric Skin: Skin color, texture, turgor normal. No rashes or lesions Data Review:  
Recent Results (from the past 24 hour(s)) EKG, 12 LEAD, INITIAL Collection Time: 12/04/19 12:33 PM  
Result Value Ref Range Ventricular Rate 52 BPM  
 Atrial Rate 74 BPM  
 QRS Duration 94 ms Q-T Interval 454 ms QTC Calculation (Bezet) 422 ms Calculated R Axis -39 degrees Calculated T Axis 98 degrees Diagnosis Atrial fibrillation premature ventricular complexes Left axis deviation Inferior infarct (cited on or before 25-JUL-2019) When compared with ECG of 26-JUL-2019 03:38, Nonspecific T wave abnormality now evident in Anterior leads Confirmed by Lui Evangelista M.D., Brayanyury Vivas ([id number]) on 12/4/2019 2:17:43 PM 
  
CBC WITH AUTOMATED DIFF Collection Time: 12/04/19 12:40 PM  
Result Value Ref Range WBC 6.2 4.1 - 11.1 K/uL  
 RBC 3.15 (L) 4.10 - 5.70 M/uL HGB 9.6 (L) 12.1 - 17.0 g/dL HCT 31.2 (L) 36.6 - 50.3 % MCV 99.0 80.0 - 99.0 FL  
 MCH 30.5 26.0 - 34.0 PG  
 MCHC 30.8 30.0 - 36.5 g/dL  
 RDW 17.1 (H) 11.5 - 14.5 % PLATELET 900 121 - 593 K/uL MPV 9.2 8.9 - 12.9 FL  
 NRBC 0.0 0  WBC ABSOLUTE NRBC 0.00 0.00 - 0.01 K/uL NEUTROPHILS 84 (H) 32 - 75 % LYMPHOCYTES 7 (L) 12 - 49 % MONOCYTES 7 5 - 13 % EOSINOPHILS 1 0 - 7 % BASOPHILS 0 0 - 1 % IMMATURE GRANULOCYTES 1 (H) 0.0 - 0.5 % ABS. NEUTROPHILS 5.2 1.8 - 8.0 K/UL  
 ABS. LYMPHOCYTES 0.4 (L) 0.8 - 3.5 K/UL  
 ABS. MONOCYTES 0.4 0.0 - 1.0 K/UL  
 ABS. EOSINOPHILS 0.1 0.0 - 0.4 K/UL  
 ABS. BASOPHILS 0.0 0.0 - 0.1 K/UL  
 ABS. IMM. GRANS. 0.1 (H) 0.00 - 0.04 K/UL  
 DF SMEAR SCANNED    
 RBC COMMENTS ANISOCYTOSIS 
1+ MAGNESIUM Collection Time: 12/04/19 12:40 PM  
Result Value Ref Range Magnesium 2.1 1.6 - 2.4 mg/dL METABOLIC PANEL, COMPREHENSIVE Collection Time: 12/04/19 12:40 PM  
Result Value Ref Range Sodium 142 136 - 145 mmol/L Potassium 4.0 3.5 - 5.1 mmol/L Chloride 107 97 - 108 mmol/L  
 CO2 27 21 - 32 mmol/L Anion gap 8 5 - 15 mmol/L Glucose 179 (H) 65 - 100 mg/dL BUN 71 (H) 6 - 20 MG/DL Creatinine 2.78 (H) 0.70 - 1.30 MG/DL  
 BUN/Creatinine ratio 26 (H) 12 - 20 GFR est AA 26 (L) >60 ml/min/1.73m2 GFR est non-AA 21 (L) >60 ml/min/1.73m2 Calcium 8.8 8.5 - 10.1 MG/DL Bilirubin, total 0.4 0.2 - 1.0 MG/DL  
 ALT (SGPT) 22 12 - 78 U/L  
 AST (SGOT) 23 15 - 37 U/L Alk. phosphatase 73 45 - 117 U/L Protein, total 7.2 6.4 - 8.2 g/dL Albumin 3.3 (L) 3.5 - 5.0 g/dL Globulin 3.9 2.0 - 4.0 g/dL A-G Ratio 0.8 (L) 1.1 - 2.2 NT-PRO BNP Collection Time: 12/04/19 12:40 PM  
Result Value Ref Range NT pro-BNP 4,482 (H) <450 PG/ML  
PROTHROMBIN TIME + INR Collection Time: 12/04/19 12:40 PM  
Result Value Ref Range INR 1.1 0.9 - 1.1 Prothrombin time 11.4 (H) 9.0 - 11.1 sec TROPONIN I Collection Time: 12/04/19 12:40 PM  
Result Value Ref Range Troponin-I, Qt. <0.05 <0.05 ng/mL SAMPLES BEING HELD Collection Time: 12/04/19 12:40 PM  
Result Value Ref Range SAMPLES BEING HELD 1RED COMMENT Add-on orders for these samples will be processed based on acceptable specimen integrity and analyte stability, which may vary by analyte. POC LACTIC ACID Collection Time: 12/04/19  1:20 PM  
Result Value Ref Range Lactic Acid (POC) 1.13 0.40 - 2.00 mmol/L  
CELL COUNT, BODY FLUID Collection Time: 12/04/19  2:45 PM  
Result Value Ref Range BODY FLUID TYPE PLEURAL FLUID FLUID COLOR RED    
 FLUID APPEARANCE TURBID    
 FLUID RBC CT. >100 (H) 0 /cu mm FLUID NUCLEATED CELLS 25,580 /cu mm  
 FLD NEUTROPHILS 2 (A) NRRE % FLD LYMPHS 2 (A) NRRE % FLD MONO/MACROPHAGES 96 (A) NRRE % PROTEIN TOTAL, FLUID Collection Time: 12/04/19  2:45 PM  
Result Value Ref Range Fluid Type: PLEURAL FLUID Protein total, body fld. 4.2 g/dL GLUCOSE, FLUID Collection Time: 12/04/19  2:45 PM  
Result Value Ref Range Fluid Type: PLEURAL FLUID Glucose, body fld. 26 MG/DL  
LDH, BODY FLUID Collection Time: 12/04/19  2:45 PM  
Result Value Ref Range Fluid Type: PLEURAL FLUID    
 LD, body fld. 3,663 U/L  
SAMPLES BEING HELD Collection Time: 12/04/19  4:32 PM  
Result Value Ref Range SAMPLES BEING HELD 1RED COMMENT Add-on orders for these samples will be processed based on acceptable specimen integrity and analyte stability, which may vary by analyte. METABOLIC PANEL, BASIC Collection Time: 12/05/19  2:43 AM  
Result Value Ref Range Sodium 144 136 - 145 mmol/L Potassium 3.5 3.5 - 5.1 mmol/L Chloride 109 (H) 97 - 108 mmol/L  
 CO2 25 21 - 32 mmol/L Anion gap 10 5 - 15 mmol/L Glucose 160 (H) 65 - 100 mg/dL BUN 70 (H) 6 - 20 MG/DL Creatinine 2.84 (H) 0.70 - 1.30 MG/DL  
 BUN/Creatinine ratio 25 (H) 12 - 20 GFR est AA 25 (L) >60 ml/min/1.73m2 GFR est non-AA 21 (L) >60 ml/min/1.73m2 Calcium 9.1 8.5 - 10.1 MG/DL  
CBC WITH AUTOMATED DIFF Collection Time: 12/05/19  2:43 AM  
Result Value Ref Range WBC 8.3 4.1 - 11.1 K/uL  
 RBC 3.43 (L) 4.10 - 5.70 M/uL  
 HGB 10.4 (L) 12.1 - 17.0 g/dL HCT 32.7 (L) 36.6 - 50.3 % MCV 95.3 80.0 - 99.0 FL  
 MCH 30.3 26.0 - 34.0 PG  
 MCHC 31.8 30.0 - 36.5 g/dL  
 RDW 17.1 (H) 11.5 - 14.5 % PLATELET 197 636 - 561 K/uL MPV 9.2 8.9 - 12.9 FL  
 NRBC 0.0 0  WBC ABSOLUTE NRBC 0.00 0.00 - 0.01 K/uL NEUTROPHILS 83 (H) 32 - 75 % LYMPHOCYTES 8 (L) 12 - 49 % MONOCYTES 7 5 - 13 % EOSINOPHILS 1 0 - 7 % BASOPHILS 0 0 - 1 % IMMATURE GRANULOCYTES 1 (H) 0.0 - 0.5 % ABS. NEUTROPHILS 6.9 1.8 - 8.0 K/UL  
 ABS. LYMPHOCYTES 0.6 (L) 0.8 - 3.5 K/UL  
 ABS. MONOCYTES 0.6 0.0 - 1.0 K/UL  
 ABS. EOSINOPHILS 0.1 0.0 - 0.4 K/UL  
 ABS. BASOPHILS 0.0 0.0 - 0.1 K/UL  
 ABS. IMM. GRANS. 0.1 (H) 0.00 - 0.04 K/UL  
 DF AUTOMATED HEMOGLOBIN A1C WITH EAG Collection Time: 12/05/19  2:43 AM  
Result Value Ref Range Hemoglobin A1c 6.5 (H) 4.0 - 5.6 % Est. average glucose 140 mg/dL Chest CT 12/04/19: 
INDICATION: Worsening shortness of breath. 
  
COMPARISON: CT 7/25/2019. Earlier chest x-ray. 
  
CONTRAST: None. 
  
TECHNIQUE:  5 mm axial images were obtained through the chest. Coronal and 
sagittal reconstructions were generated.   CT dose reduction was achieved through 
use of a standardized protocol tailored for this examination and automatic 
exposure control for dose modulation. 
  
The absence of intravenous contrast reduces the sensitivity for evaluation of 
the mediastinum and upper abdominal organs. 
  
FINDINGS: 
  
THYROID: No nodule. MEDIASTINUM: Unchanged small right paratracheal, aorticopulmonary and subcarinal 
lymph nodes. NATACHA: Left hilar contours completely obscured. THORACIC AORTA: Diffuse atherosclerotic calcifications. Ectasia of the ascending 
aorta are again shown measuring up to 4.8 cm diameter. MAIN PULMONARY ARTERY: Normal in caliber. TRACHEA/BRONCHI: Patent. ESOPHAGUS: No wall thickening or dilatation. HEART: Mild cardiac contour enlargement again shown. Diminished attenuation of 
the cardiac blood pool consistent with anemia. PLEURA AND LUNGS: Very large left pleural effusion which is increased in the 
interval. The left hemidiaphragm is inverted in the interval. Thickening of the 
medial pleural margin adjacent to residual aerated upper lung, part of which may 
represent pulmonary atelectasis. Atelectasis of the left lower lobe. Small right 
pleural effusion which is decreased in the interval. 
INCIDENTALLY IMAGED UPPER ABDOMEN: No focal abnormality. BONES: Vertebral compression fractures at 2 contiguous segments in the lower 
thoracic spine again shown, the more superior moderate in the more inferior 
mild-moderate, appearing unchanged. 
  
IMPRESSION IMPRESSION: 
Bilateral pleural effusions, smaller right (decreased), and very large on the 
left with left hemidiaphragmatic inversion (increased.) Chest x-ray 12/05/19 Report pending Assessment:  
 
Active Problems: 
  SOB (shortness of breath) (12/4/2019) Plan:  
Recommend consulting his cardiologist Dr Beckford Cluster Will review CXR Further recommendations per DR Bakari Fallon Signed By: Monroe Ortiz NP December 5, 2019

## 2019-12-05 NOTE — NURSE NAVIGATOR
Chart reviewed by Heart Failure Nurse Navigator. Heart Failure database completed. EF:  56/60% ACEi/ARB/ARNi: currently contraindicated d/t renal insufficiency BB: currently not indicated Aldosterone Antagonist: currently not indicated Obstructive Sleep Apnea Screening: STOP-BANG score: 
 Referred to Sleep Medicine: CRT currently not indicated. NYHA Functional Class IV on admission Heart Failure Teach Back in Patient Education. Heart Failure Avoiding Triggers on Discharge Instructions. Cardiologist: Dr. Hiwot Bennett. Post discharge follow up phone call to be made within 48-72 hours of discharge.

## 2019-12-06 NOTE — PROGRESS NOTES
Reason for Admission:  Shortness of breath RRAT Score:    20-Moderate Do you (patient/family) have any concerns for transition/discharge? None reported. Plan for utilizing home health:   TBD: patient may return to [facility] with home health vs SNF. Current Advanced Directive/Advance Care Plan:  Patient does not have an AMD on file and is a Full code. Transition of Care Plan:  CM met patient and wife to inform of CM role and to assess needs. Patient resides in [place] at [facility]. His wife is currently in healthcare there. Patient reportedly uses a walker and cane at baseline. Patient may need to return to healthcare at discharge. Susana Arzola at [facility] states that this would be fine and to follow up with the nursing supervisor at [phone]. Patient unsure if he will need medical transport or if family will be available to transport him. Patient has two children listed as emergency contacts including his wife all who live locally. CM to monitor.   
 
Garret Boss MS

## 2019-12-06 NOTE — PROGRESS NOTES
Cardiology Progress Note 
2019     Admit Date: 2019 Admit Diagnosis: SOB (shortness of breath) [R06.02]  CC: none currently Assessment:  
Active Problems: 
  SOB (shortness of breath) (2019) Plan:  
 
Echo pending Started low dose diuretics, limited dose given plan for repeat thoracentesis Daily BMP Subjective:   
 
Alma Delia Ro has no cardiac c/o this AM 
 
Objective:  
 Physical Exam: 
Overall VSSAF;   
Visit Vitals /79 (BP 1 Location: Left arm, BP Patient Position: At rest) Pulse 67 Temp 98.3 °F (36.8 °C) Resp 16 Ht 6' 0.01\" (1.829 m) Wt 71.4 kg (157 lb 6.5 oz) SpO2 97% BMI 21.34 kg/m² Temp (24hrs), Av.1 °F (36.2 °C), Min:95.4 °F (35.2 °C), Max:98.3 °F (36.8 °C) Patient Vitals for the past 8 hrs: 
 Pulse 19 0716 67  
19 0419 65 Patient Vitals for the past 8 hrs: 
 Resp  
19 0716 16  
19 0419 15 Patient Vitals for the past 8 hrs: 
 BP  
19 0716 161/79  
19 0548 147/73  
19 0419 (!) 173/96  
  
12 1901 -  0700 In: 56 [P.O.:442] Out: - General Appearance: Well developed, well nourished, no acute distress. Ears/Nose/Mouth/Throat:   Normal MM; anicteric. JVP: WNL Resp:   Decreased at the bases Cardiovascular:  Irreg Abdomen:   Soft, non-tender, bowel sounds are present. Extremities: No edema bilaterally. Skin: 
Neuro: Warm and dry. Alert Data Review:    
Telemetry independently reviewed :   AFIB Labs:  
Recent Results (from the past 24 hour(s)) METABOLIC PANEL, COMPREHENSIVE Collection Time: 19  5:06 AM  
Result Value Ref Range Sodium 145 136 - 145 mmol/L Potassium 3.6 3.5 - 5.1 mmol/L Chloride 112 (H) 97 - 108 mmol/L  
 CO2 26 21 - 32 mmol/L Anion gap 7 5 - 15 mmol/L Glucose 113 (H) 65 - 100 mg/dL BUN 63 (H) 6 - 20 MG/DL  Creatinine 2.75 (H) 0.70 - 1.30 MG/DL  
 BUN/Creatinine ratio 23 (H) 12 - 20    
 GFR est AA 26 (L) >60 ml/min/1.73m2 GFR est non-AA 22 (L) >60 ml/min/1.73m2 Calcium 9.0 8.5 - 10.1 MG/DL Bilirubin, total 0.6 0.2 - 1.0 MG/DL  
 ALT (SGPT) 19 12 - 78 U/L  
 AST (SGOT) 26 15 - 37 U/L Alk. phosphatase 62 45 - 117 U/L Protein, total 6.8 6.4 - 8.2 g/dL Albumin 3.0 (L) 3.5 - 5.0 g/dL Globulin 3.8 2.0 - 4.0 g/dL A-G Ratio 0.8 (L) 1.1 - 2.2    
CBC W/O DIFF Collection Time: 12/06/19  5:06 AM  
Result Value Ref Range WBC 6.8 4.1 - 11.1 K/uL  
 RBC 3.18 (L) 4.10 - 5.70 M/uL HGB 9.7 (L) 12.1 - 17.0 g/dL HCT 31.4 (L) 36.6 - 50.3 % MCV 98.7 80.0 - 99.0 FL  
 MCH 30.5 26.0 - 34.0 PG  
 MCHC 30.9 30.0 - 36.5 g/dL  
 RDW 17.3 (H) 11.5 - 14.5 % PLATELET 516 699 - 837 K/uL MPV 9.3 8.9 - 12.9 FL  
 NRBC 0.0 0  WBC ABSOLUTE NRBC 0.00 0.00 - 0.01 K/uL MAGNESIUM Collection Time: 12/06/19  5:06 AM  
Result Value Ref Range Magnesium 2.2 1.6 - 2.4 mg/dL PHOSPHORUS Collection Time: 12/06/19  5:06 AM  
Result Value Ref Range Phosphorus 3.9 2.6 - 4.7 MG/DL Current medications reviewed Jairo Sorensen MD

## 2019-12-06 NOTE — PROGRESS NOTES
Patient identified for rounding by Awais Barrera, charge nurse on [facility]. Attempted to speak with patient but testing was being done. Later returned and patient was sleeping. Daughter and wife present in room and this writer so this writer introduced self. Returned once more and patient was awake, alert, and disoriented. Speech was mumbling and difficult to understand. Patient denied needs at this time.

## 2019-12-06 NOTE — PROGRESS NOTES
A Spiritual Care Partner Volunteer visited patient in Rm 420 on 12/06/2019. Documented by: 
Chaplain Mcgill MDiv, MS, [place] [phone] PRAY ([phone])

## 2019-12-06 NOTE — PROGRESS NOTES
[address] [facility] Adult  Hospitalist Group Hospitalist Progress Note Jannet Faustin MD 
Answering service: [phone] OR [phone] from in house phone Date of Service:  2019 NAME:  Leydi Quinones :  1926 MRN:  [id number] Admission Summary:  
 
The patient is a 80-year-old gentleman with past medical history of hypertension, history of colon cancer, atrial fibrillation, CHF, diabetes mellitus type 2, and a skin cancer, who presents to the hospital with shortness of breath . Interval history / Subjective:  
 
Patient said breathing better, no chest pain Assessment & Plan:  
 
Large pleural effusion L>R likely due to acute on chronic CHF 
-s/p left thoracentesis , patient removed the catheter last night 
-s/p left thoracentesis , follow up pleural fluid analysis 
-repeat chest x ray pending 
-CT chest bilateral pleural effusions, smaller right (decreased), and very large on the 
left with left hemidiaphragmatic inversion (increased.) -on iv zosyn  
-prn oxygen support, prn pulse ox monitor - thoracic surgeon on board Acute on chronic diastolic CHF NYHA Class IV  
-Last EF on 19 was 56-60% 
-on lasix and hydralazine  
-not on ACEi/ARB due to renal insufficiency  
-echo pending 
-cardiologist on board Chronic A Fib, rate controlled -on verapmile, eliquis is held for thoracentesis, continue cardiac monitoring Acute metabolic encephalopathy POA 
-improving, patient is conscious and alert, oriented to place and person  
-continue supportive care URIEL on CKD stage IV 
-creatinine trending up  
-avoid nephrotoxin  
-monitor renal function HTN 
-BP normal, on verapamil, hydralazine and monitor BP Anemia of chronic disease  
-H/H stable, monitor cbc Hypothyroidism  
-stable, continue synthroid Recurrent fall 
-fall precaution  
-consult to PT/OT Hx of colon ca  
-s/p surgery 5 years ago 
-stable Hx of basal cell skin ca 
-stable Code status: Full Code DVT prophylaxis: Eliquis Care Plan discussed with: Patient/Family and Nurse Disposition: [facility] Discussed with patient and his daughter at bedside, questions answered DaughterAnthony, [phone] at bedside, updated plan of care and questions answered 12/5 Hospital Problems  Date Reviewed: 7/25/2019 Codes Class Noted POA  
 SOB (shortness of breath) ICD-10-CM: R06.02 
ICD-9-CM: 786.05  12/4/2019 Unknown Vital Signs:  
 Last 24hrs VS reviewed since prior progress note. Most recent are: 
Visit Vitals /44 (BP 1 Location: Left arm, BP Patient Position: At rest) Pulse 68 Temp 95.9 °F (35.5 °C) Resp 16 Ht 6' 0.01\" (1.829 m) Wt 71.4 kg (157 lb 6.5 oz) SpO2 97% BMI 21.34 kg/m² Intake/Output Summary (Last 24 hours) at 12/6/2019 1225 Last data filed at 12/6/2019 1125 Gross per 24 hour Intake 150 ml Output 200 ml Net -50 ml Physical Examination:  
 
 
     
Constitutional:  No acute distress, cooperative, pleasant ENT:  Oral mucous moist, oropharynx benign. Resp:  Decrease bronchial breath sound on the left, No wheezing/rhonchi/rales. No accessory muscle use CV:  Regular rhythm, normal rate, no murmurs, gallops, rubs GI:  Soft, non distended, non tender. normoactive bowel sounds, no hepatosplenomegaly Musculoskeletal:  No edema Neurologic:  Conscious and alert, oriented to place and person,  moves all extremities. CN II-XII reviewed Skin:  multiple bruises on extremities Data Review:  
 Review and/or order of clinical lab test 
Review and/or order of tests in the radiology section of CPT Review and/or order of tests in the medicine section of CPT Labs:  
 
Recent Labs 12/06/19 
1051 12/05/19 
8916 WBC 6.8 8.3 HGB 9.7* 10.4* HCT 31.4* 32.7*  
 175 Recent Labs 12/06/19 
0506 12/05/19 
0243 12/04/19 
1240  144 142  
K 3.6 3.5 4.0  
* 109* 107 CO2 26 25 27 BUN 63* 70* 71* CREA 2.75* 2.84* 2.78* * 160* 179* CA 9.0 9.1 8.8 MG 2.2  --  2.1 PHOS 3.9  --   --   
 
Recent Labs 12/06/19 
0506 12/04/19 
1240 SGOT 26 23 ALT 19 22 AP 62 73 TBILI 0.6 0.4 TP 6.8 7.2 ALB 3.0* 3.3*  
GLOB 3.8 3.9 Recent Labs 12/04/19 
1240 INR 1.1 PTP 11.4* No results for input(s): FE, TIBC, PSAT, FERR in the last 72 hours. Lab Results Component Value Date/Time Folate 9.8 07/26/2019 06:03 AM  
  
No results for input(s): PH, PCO2, PO2 in the last 72 hours. Recent Labs 12/04/19 
1240 TROIQ <0.05 Lab Results Component Value Date/Time Cholesterol, total 142 07/26/2019 06:03 AM  
 HDL Cholesterol 79 07/26/2019 06:03 AM  
 LDL, calculated 56.2 07/26/2019 06:03 AM  
 Triglyceride 34 07/26/2019 06:03 AM  
 CHOL/HDL Ratio 1.8 07/26/2019 06:03 AM  
 
Lab Results Component Value Date/Time Glucose (POC) 148 (H) 07/11/2013 09:36 PM  
 
Lab Results Component Value Date/Time Color YELLOW/STRAW 07/26/2019 12:58 PM  
 Appearance CLEAR 07/26/2019 12:58 PM  
 Specific gravity 1.008 07/26/2019 12:58 PM  
 pH (UA) 7.5 07/26/2019 12:58 PM  
 Protein NEGATIVE  07/26/2019 12:58 PM  
 Glucose NEGATIVE  07/26/2019 12:58 PM  
 Ketone NEGATIVE  07/26/2019 12:58 PM  
 Bilirubin NEGATIVE  07/26/2019 12:58 PM  
 Urobilinogen 1.0 07/26/2019 12:58 PM  
 Nitrites NEGATIVE  07/26/2019 12:58 PM  
 Leukocyte Esterase NEGATIVE  07/26/2019 12:58 PM  
 
 
 
Medications Reviewed:  
 
Current Facility-Administered Medications Medication Dose Route Frequency  hydrALAZINE (APRESOLINE) tablet 25 mg  25 mg Oral TID  furosemide (LASIX) injection 40 mg  40 mg IntraVENous DAILY  verapamil ER (CALAN-SR) tablet 90 mg  90 mg Oral DAILY WITH BREAKFAST  dutasteride (AVODART) capsule 0.5 mg  0.5 mg Oral DAILY  levothyroxine (SYNTHROID) tablet 175 mcg  175 mcg Oral ACB  pantoprazole (PROTONIX) tablet 40 mg  40 mg Oral BID  thiamine HCL (B-1) tablet 100 mg  100 mg Oral DAILY  sodium chloride (NS) flush 5-40 mL  5-40 mL IntraVENous Q8H  
 sodium chloride (NS) flush 5-40 mL  5-40 mL IntraVENous PRN  
 acetaminophen (TYLENOL) tablet 650 mg  650 mg Oral Q4H PRN  
 hydrALAZINE (APRESOLINE) 20 mg/mL injection 10 mg  10 mg IntraVENous Q6H PRN  piperacillin-tazobactam (ZOSYN) 3.375 g in 0.9% sodium chloride (MBP/ADV) 100 mL  3.375 g IntraVENous Q12H  
 
______________________________________________________________________ EXPECTED LENGTH OF STAY: - - - 
ACTUAL LENGTH OF STAY:          2 Bridger Maldonado MD

## 2019-12-06 NOTE — PROGRESS NOTES
Problem: Self Care Deficits Care Plan (Adult) Goal: *Acute Goals and Plan of Care (Insert Text) Description FUNCTIONAL STATUS PRIOR TO ADMISSION: Patient was modified independent using a rolling walker/cane intermittently for functional mobility. HOME SUPPORT: The patient lived at [facility] however was performing self-care tasks independently. Patient's wife also lives at [facility] on a different floor. Patient received assistance for all IADL tasks. Occupational Therapy Goals Initiated 12/6/2019 1. Patient will perform grooming with supervision/set-up within 7 day(s). 2.  Patient will perform lower body dressing with supervision/set-up within 7 day(s). 3.  Patient will perform bathing with supervision/set-up within 7 day(s). 4.  Patient will perform toilet transfers with supervision/set-up within 7 day(s). 5.  Patient will perform all aspects of toileting with supervision/set-up within 7 day(s). 6.  Patient will participate in upper extremity therapeutic exercise/activities with supervision/set-up for 5 minutes within 7 day(s). 7.  Patient will utilize energy conservation techniques during functional activities with verbal cues within 7 day(s). Outcome: Progressing Towards Goal 
 OCCUPATIONAL THERAPY EVALUATION Patient: Paul Rmoan (15 y.o. male) Date: 12/6/2019 Primary Diagnosis: SOB (shortness of breath) [R06.02] Precautions: Fall ASSESSMENT Based on the objective data described below, the patient presents with generalized weakness, decreased endurance, and decreased activity tolerance s/p thoracentesis. PTA, patient lived at [address] and was largely MOD I for ADL and IADL tasks using a walker/cane intermittently for functional mobility. Patient requiring largely CGA for ADL tasks in session today. Recommend discharge to healthcare unit at [facility] at discharge vs back to [facility] apartment with [facility] services and 24/7 supervision for safety. Current Level of Function Impacting Discharge (ADLs/self-care): CGA LB ADLs Functional Outcome Measure: The patient scored 40/100 on the Barthel Index outcome measure which is indicative of 60% ADL impairment. Other factors to consider for discharge: good family support Patient will benefit from skilled therapy intervention to address the above noted impairments. PLAN : 
Recommendations and Planned Interventions: self care training, functional mobility training, therapeutic exercise, balance training, therapeutic activities, endurance activities, patient education, home safety training, and family training/education Frequency/Duration: Patient will be followed by occupational therapy 5 times a week to address goals. Recommendation for discharge: (in order for the patient to meet his/her long term goals) Therapy up to 5 days/week in SNF setting (healthcare unit at [facility] versus 24/7 supervision and [address] [place] [address]) This discharge recommendation: 
Has been made in collaboration with the attending provider and/or case management IF patient discharges home will need the following DME: to be determined (TBD) SUBJECTIVE:  
Patient stated I don't know what to tell you (after stating patient is mobile).  OBJECTIVE DATA SUMMARY:  
HISTORY:  
Past Medical History:  
Diagnosis Date Arrhythmia   
 irrigular heart beat- A FIB Cancer [facility]) 2013 COLON Cancer ([facility] [address] SKIN Colon cancer ([facility] [address] 7/1/2013 Hypertension Past Surgical History:  
Procedure Laterality Date HX OTHER SURGICAL    
 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  
 HX TONSIL AND ADENOIDECTOMY AS CHILD Expanded or extensive additional review of patient history:  
 
Home Situation Home Environment: Assisted living([facility]) Care Facility Name: [facility] One/Two Story Residence: One story Living Alone: No 
Support Systems: Assisted living, Family member(s) Patient Expects to be Discharged to[de-identified] Assisted living Current DME Used/Available at Home: Nicholette Peeling, rollator Tub or Shower Type: Shower EXAMINATION OF PERFORMANCE DEFICITS: 
Cognitive/Behavioral Status: 
Neurologic State: Alert Orientation Level: Oriented to person;Oriented to place;Oriented to time Cognition: Appropriate for age attention/concentration Perception: Appears intact Perseveration: Perseverates during ADLS Safety/Judgement: Decreased awareness of need for safety;Decreased insight into deficits Skin: exposed areas grossly intact Edema: none noted Hearing: Auditory Auditory Impairment: Hard of hearing, bilateral 
 
Vision/Perceptual:   
    
    
    
  
    
    
Corrective Lenses: Reading glasses Range of Motion: BUE 
AROM: Within functional limits PROM: Within functional limits Strength: BUE Strength: Generally decreased, functional 
  
  
  
  
 
Coordination: 
Coordination: Within functional limits Tone & Sensation: BUE Tone: Normal 
Sensation: Intact Balance: 
Sitting: Intact Standing: Intact; With support Functional Mobility and Transfers for ADLs: 
Bed Mobility: 
Supine to Sit: Stand-by assistance Sit to Supine: Stand-by assistance Transfers: 
Sit to Stand: Contact guard assistance Stand to Sit: Contact guard assistance ADL Assessment: 
Feeding: Setup;Supervision(in bed) Oral Facial Hygiene/Grooming: Contact guard assistance(standing at the sink) Bathing: Contact guard assistance(infer A for balance) Upper Body Dressing: Contact guard assistance(infer A for item retrieval) Lower Body Dressing: Contact guard assistance(infer A for bilateral lower legs) Toileting: Contact guard assistance(standing to urinate) ADL Intervention and task modifications: 
  
 
Grooming Washing Hands: Contact guard assistance Brushing Teeth: Contact guard assistance Toileting Toileting Assistance: Contact guard assistance Cognitive Retraining Safety/Judgement: Decreased awareness of need for safety;Decreased insight into deficits Functional Measure: 
Barthel Index: 
 
Bathin Bladder: 5 Bowels: 5 Groomin Dressin Feedin Mobility: 0 Stairs: 0 Toilet Use: 5 Transfer (Bed to Chair and Back): 10 Total: 40/100 The Barthel ADL Index: Guidelines 1. The index should be used as a record of what a patient does, not as a record of what a patient could do. 2. The main aim is to establish degree of independence from any help, physical or verbal, however minor and for whatever reason. 3. The need for supervision renders the patient not independent. 4. A patient's performance should be established using the best available evidence. Asking the patient, friends/relatives and nurses are the usual sources, but direct observation and common sense are also important. However direct testing is not needed. 5. Usually the patient's performance over the preceding 24-48 hours is important, but occasionally longer periods will be relevant. 6. Middle categories imply that the patient supplies over 50 per cent of the effort. 7. Use of aids to be independent is allowed. Alex Orantes., Barthel, D.W. (2589). Functional evaluation: the Barthel Index. [address] [facility] (14)2. APOLLO Hunter, Tran Harkins., Sammuel Hatchet.[facility], [address] [place] [address] (). Measuring the change indisability after inpatient rehabilitation; comparison of the responsiveness of the Barthel Index and Functional [place] Measure. Journal of Neurology, Neurosurgery, and Psychiatry, 66(4), 754-275. Shameka Maharaj, N.J.A, LAYTON Black, & Kevon Buenrostro M.A. (2004.) Assessment of post-stroke quality of life in cost-effectiveness studies: The usefulness of the Barthel Index and the EuroQoL-5D. [facility], 13, [phone] Occupational Therapy Evaluation Charge Determination History Examination Decision-Making LOW Complexity : Brief history review  MEDIUM Complexity : 3-5 performance deficits relating to physical, cognitive , or psychosocial skils that result in activity limitations and / or participation restrictions MEDIUM Complexity : Patient may present with comorbidities that affect occupational performnce. Miniml to moderate modification of tasks or assistance (eg, physical or verbal ) with assesment(s) is necessary to enable patient to complete evaluation Based on the above components, the patient evaluation is determined to be of the following complexity level: LOW Activity Tolerance:  
Fair and requires rest breaks Please refer to the flowsheet for vital signs taken during this treatment. After treatment patient left in no apparent distress:   
Supine in bed, Call bell within reach, and Caregiver / family present COMMUNICATION/EDUCATION:  
The patients plan of care was discussed with: Physical Therapist and Registered Nurse. Home safety education was provided and the patient/caregiver indicated understanding., Patient/family have participated as able in goal setting and plan of care. , and Patient/family agree to work toward stated goals and plan of care. This patients plan of care is appropriate for delegation to [facility]. Thank you for this referral. 
Christiano Rodriguez Time Calculation: 24 mins

## 2019-12-06 NOTE — PROGRESS NOTES
Mr. Nj Hernandez was admitted with a large left pleural effusion. No acute events overnight. Afebrile HD stable On exam he is resting comfortably I did not awaken him Hgb 9.7 Diagnoses  1- Exudative left pleural effusion Mr. Nj Hernandez has an exudative left effusion of uncertain etiology. Our recommendations are for a repeat Thoracentesis to [facility] draining left hemithorax. We did not get to see the original fluid. Patient is on Eliquis and there is a question about a hemothorax, but he had ~2L drained and his Hgb has not dropped over the past 6 months. Send fluid for cytology, repeat Chest CT once hemithorax is drained. No surgical intervention planned at this time.

## 2019-12-06 NOTE — PROGRESS NOTES
Chart reviewed and spoke to RN regarding events of last night (patient confused and self-removed chest tube and IV). Per RN, plan to do a re-do thoracentesis today. Requested to hold OT until this afternoon. Will follow up for OT evaluation as able. Thank you.

## 2019-12-06 NOTE — PROGRESS NOTES
Problem: Mobility Impaired (Adult and Pediatric) Goal: *Acute Goals and Plan of Care (Insert Text) Description FUNCTIONAL STATUS PRIOR TO ADMISSION: Patient was modified independent using a rollator for functional mobility. HOME SUPPORT PRIOR TO ADMISSION: The patient lived at [address] [place] [address] with his wife. Physical Therapy Goals Initiated 12/6/2019 1. Patient will move from supine to sit and sit to supine , scoot up and down and roll side to side in bed with supervision/set-up within 7 day(s). 2.  Patient will transfer from bed to chair and chair to bed with supervision/set-up using the least restrictive device within 7 day(s). 3.  Patient will perform sit to stand with supervision/set-up within 7 day(s). 4.  Patient will ambulate with supervision/set-up for 100 feet with the least restrictive device within 7 day(s). Outcome: Progressing Towards Goal 
PHYSICAL THERAPY EVALUATION Patient: Dalton Rutherford (7 y.o. male) Date: 12/6/2019 Primary Diagnosis: SOB (shortness of breath) [R06.02] Precautions:   Fall ASSESSMENT Based on the objective data described below, the patient presents with very impulsive behavior with decreased safety awareness. He is up in the bathroom with nursing on arrival.  He uses a rollator at baseline and is pushing the RW way out in front of him to ambulate. Then he pushed it away stating he didn't like it and held the furniture and walls. He declined ambulation in the hallway and returned to the bedside chair but didn't like that so went back to bed. Quite fidgety. He and the family anticipate him returning to AL and being seen by PT. Current Level of Function Impacting Discharge (mobility/balance): Requires assist for safety. Functional Outcome Measure: The patient scored 40 on the Barthel outcome measure which is indicative of 60% debility. .   
 
Other factors to consider for discharge: Will require supervision for safety. Patient will benefit from skilled therapy intervention to address the above noted impairments. PLAN : 
Recommendations and Planned Interventions: gait training and therapeutic exercises Frequency/Duration: Patient will be followed by physical therapy:  5 times a week to address goals. Recommendation for discharge: (in order for the patient to meet his/her long term goals) To be determined: PT at [address] [place] [address]. This discharge recommendation: 
Has not yet been discussed the attending provider and/or case management IF patient discharges home will need the following DME: none SUBJECTIVE:  
Patient stated I want to get back to bed.  OBJECTIVE DATA SUMMARY:  
HISTORY:   
Past Medical History:  
Diagnosis Date Arrhythmia   
 irrigular heart beat- A FIB Cancer [facility]) 2013 COLON Cancer ([facility] SKIN Colon cancer ([facility] 7/1/2013 Hypertension Past Surgical History:  
Procedure Laterality Date HX OTHER SURGICAL    
 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  
 HX TONSIL AND ADENOIDECTOMY AS CHILD Personal factors and/or comorbidities impacting plan of care: Impulsive. Home Situation Home Environment: Assisted living([facility]) Care Facility Name: [address] [place] [address] One/Two Story Residence: One story Living Alone: No 
Support Systems: Assisted living, Family member(s) Patient Expects to be Discharged to[de-identified] Assisted living Current DME Used/Available at Home: Roberto Jasmina, rollator Tub or Shower Type: Shower EXAMINATION/PRESENTATION/DECISION MAKING:  
Critical Behavior: 
Neurologic State: Alert Orientation Level: Oriented to person, Oriented to place, Oriented to time Cognition: Appropriate for age attention/concentration Safety/Judgement: Decreased awareness of need for safety, Decreased insight into deficits Hearing: Auditory Auditory Impairment: Hard of hearing, bilateral 
Skin:  per nursing. Edema: per nursing. Range Of Motion: AROM: Within functional limits PROM: Within functional limits Strength:   
Strength: Generally decreased, functional 
  
  
  
  
  
  
Tone & Sensation:  
Tone: Normal 
  
  
  
  
Sensation: Intact Coordination: 
Coordination: Within functional limits Vision:  
Corrective Lenses: Reading glasses Functional Mobility: 
Bed Mobility: 
  
Supine to Sit: Stand-by assistance Sit to Supine: Stand-by assistance Transfers: 
Sit to Stand: Contact guard assistance Stand to Sit: Contact guard assistance Balance:  
Sitting: Intact Standing: Intact; With support Ambulation/Gait Training: 
Distance (ft): 20 Feet (ft) Assistive Device: Gait belt;Walker, rolling Ambulation - Level of Assistance: Contact guard assistance Gait Abnormalities: Decreased step clearance; Other(flexed posture) Right Side Weight Bearing: Full Left Side Weight Bearing: Full Base of Support: Widened Speed/Ambreen: Pace decreased (<100 feet/min); Shuffled Step Length: Right shortened;Left shortened Functional Measure: 
Barthel Index: 
 
Bathin Bladder: 5 Bowels: 5 Groomin Dressin Feedin Mobility: 0 Stairs: 0 Toilet Use: 5 Transfer (Bed to Chair and Back): 10 Total: 40/100 The Barthel ADL Index: Guidelines 1. The index should be used as a record of what a patient does, not as a record of what a patient could do. 2. The main aim is to establish degree of independence from any help, physical or verbal, however minor and for whatever reason. 3. The need for supervision renders the patient not independent. 4. A patient's performance should be established using the best available evidence. Asking the patient, friends/relatives and nurses are the usual sources, but direct observation and common sense are also important. However direct testing is not needed.  
5. Usually the patient's performance over the preceding 24-48 hours is important, but occasionally longer periods will be relevant. 6. Middle categories imply that the patient supplies over 50 per cent of the effort. 7. Use of aids to be independent is allowed. Tejinder Mcmahan., Barthel, D.W. ([id number]). Functional evaluation: the Barthel Index. [facility] (14)2. APOLLO Mcdonald, [facility]., [facility], [address] [place] [address] (1999). Measuring the change indisability after inpatient rehabilitation; comparison of the responsiveness of the Barthel Index and Functional [place] Measure. Journal of Neurology, Neurosurgery, and Psychiatry, 66(4), 392-598. Everardo Estrella, N.J.A, LAYTON Black, & Cyrus Salvador MCIRILO. (2004.) Assessment of post-stroke quality of life in cost-effectiveness studies: The usefulness of the Barthel Index and the EuroQoL-5D. [facility], 75, 505-21 Physical Therapy Evaluation Charge Determination History Examination Presentation Decision-Making LOW Complexity : Zero comorbidities / personal factors that will impact the outcome / POC LOW Complexity : 1-2 Standardized tests and measures addressing body structure, function, activity limitation and / or participation in recreation  LOW Complexity : Stable, uncomplicated  LOW Complexity : FOTO score of  Based on the above components, the patient evaluation is determined to be of the following complexity level: LOW Activity Tolerance:  
Good Please refer to the flowsheet for vital signs taken during this treatment. After treatment patient left in no apparent distress:  
Supine in bed, Call bell within reach, and Caregiver / family present COMMUNICATION/EDUCATION:  
The patients plan of care was discussed with: Occupational Therapist, Registered Nurse, and Physician. Patient is unable to participate in goal setting and plan of care. Thank you for this referral. 
Ulysses Ford, PT Time Calculation: 23 mins

## 2019-12-06 NOTE — CONSULTS
CARDIOLOGY CONSULT Subjective: 
 
Date of  Admission: 12/4/2019 12:13 PM  
 
Delayed note from 12/5 Admission type:Emergency Victoria Quezada is a 80 y.o. male admitted for SOB (shortness of breath) [R06.02]. He has a known hx of diastolic CHF> Admitted for worsening SOB and large pleural effusion. Had thoracentesis performed with initial good drainage of fluid but he pulled the tube out. Initially also was diuresed but Cr bumped slightly. He has had some improvement in his breathing and was having adequate oxygen saturations. Patient Active Problem List  
 Diagnosis Date Noted  SOB (shortness of breath) 12/04/2019  Acute on chronic systolic CHF (congestive heart failure) ([facility] [address] 07/25/2019  [facility]2013 Saurabh Sosa DO Past Medical History:  
Diagnosis Date  Arrhythmia   
 irrigular heart beat- A FIB  Cancer [facility]) 2013 COLON   
 Cancer ([facility] SKIN  
 Colon cancer ([facility] [address] 7/1/2013  Hypertension Past Surgical History:  
Procedure Laterality Date  HX OTHER SURGICAL    
 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  
 HX TONSIL AND ADENOIDECTOMY AS CHILD No Known Allergies Family History Problem Relation Age of Onset  Cancer Mother   
     colon  Cancer Father   
     lung  Cancer Brother UNKNOWN Current Facility-Administered Medications Medication Dose Route Frequency  hydrALAZINE (APRESOLINE) tablet 25 mg  25 mg Oral TID  furosemide (LASIX) injection 40 mg  40 mg IntraVENous DAILY  verapamil ER (CALAN-SR) tablet 90 mg  90 mg Oral DAILY WITH BREAKFAST  dutasteride (AVODART) capsule 0.5 mg  0.5 mg Oral DAILY  levothyroxine (SYNTHROID) tablet 175 mcg  175 mcg Oral ACB  pantoprazole (PROTONIX) tablet 40 mg  40 mg Oral BID  thiamine HCL (B-1) tablet 100 mg  100 mg Oral DAILY  sodium chloride (NS) flush 5-40 mL  5-40 mL IntraVENous Q8H  
  sodium chloride (NS) flush 5-40 mL  5-40 mL IntraVENous PRN  
 acetaminophen (TYLENOL) tablet 650 mg  650 mg Oral Q4H PRN  
 hydrALAZINE (APRESOLINE) 20 mg/mL injection 10 mg  10 mg IntraVENous Q6H PRN  piperacillin-tazobactam (ZOSYN) 3.375 g in 0.9% sodium chloride (MBP/ADV) 100 mL  3.375 g IntraVENous Q12H Review of Symptoms: 
Gen - no F/C/S Eyes - no vision changes ENT - no sore throat, rhinorrhea, otalgia CV - no CP, no palpitations, no orthopnea, no PND, no RENY Resp no cough, no SOB/NATARAJAN 
GI - no AP, no n/v/d/c 
 - no dysuria, no hematuria MSK - no abnormal joint pains Skin - no rashes Neuro - no HA, no numbness, no weakness, no slurred speech Psych - no change in mood Physical Exam 
 
Visit Vitals /79 (BP 1 Location: Left arm, BP Patient Position: At rest) Pulse 67 Temp 98.3 °F (36.8 °C) Resp 16 Ht 6' 0.01\" (1.829 m) Wt 71.4 kg (157 lb 6.5 oz) SpO2 97% BMI 21.34 kg/m² NAD Skin warm and dry Nl conjunctiva Oropharynx without exudate. Neck supple Lungs with decreased BS at the bases L>R Normal S1/ S2 with occasional ectopy Abdomen soft and non tender Pulses 2+ radials No RENY Neuro:  Grossly intact Alert Cardiographics Telemetry: AF 
ECG: atrial fibrillation Echocardiogram: pending Labs:  
Recent Results (from the past 24 hour(s)) METABOLIC PANEL, COMPREHENSIVE Collection Time: 12/06/19  5:06 AM  
Result Value Ref Range Sodium 145 136 - 145 mmol/L Potassium 3.6 3.5 - 5.1 mmol/L Chloride 112 (H) 97 - 108 mmol/L  
 CO2 26 21 - 32 mmol/L Anion gap 7 5 - 15 mmol/L Glucose 113 (H) 65 - 100 mg/dL BUN 63 (H) 6 - 20 MG/DL Creatinine 2.75 (H) 0.70 - 1.30 MG/DL  
 BUN/Creatinine ratio 23 (H) 12 - 20 GFR est AA 26 (L) >60 ml/min/1.73m2 GFR est non-AA 22 (L) >60 ml/min/1.73m2 Calcium 9.0 8.5 - 10.1 MG/DL  Bilirubin, total 0.6 0.2 - 1.0 MG/DL  
 ALT (SGPT) 19 12 - 78 U/L  
 AST (SGOT) 26 15 - 37 U/L  
 Alk. phosphatase 62 45 - 117 U/L Protein, total 6.8 6.4 - 8.2 g/dL Albumin 3.0 (L) 3.5 - 5.0 g/dL Globulin 3.8 2.0 - 4.0 g/dL A-G Ratio 0.8 (L) 1.1 - 2.2    
CBC W/O DIFF Collection Time: 12/06/19  5:06 AM  
Result Value Ref Range WBC 6.8 4.1 - 11.1 K/uL  
 RBC 3.18 (L) 4.10 - 5.70 M/uL HGB 9.7 (L) 12.1 - 17.0 g/dL HCT 31.4 (L) 36.6 - 50.3 % MCV 98.7 80.0 - 99.0 FL  
 MCH 30.5 26.0 - 34.0 PG  
 MCHC 30.9 30.0 - 36.5 g/dL  
 RDW 17.3 (H) 11.5 - 14.5 % PLATELET 398 080 - 946 K/uL MPV 9.3 8.9 - 12.9 FL  
 NRBC 0.0 0  WBC ABSOLUTE NRBC 0.00 0.00 - 0.01 K/uL MAGNESIUM Collection Time: 12/06/19  5:06 AM  
Result Value Ref Range Magnesium 2.2 1.6 - 2.4 mg/dL PHOSPHORUS Collection Time: 12/06/19  5:06 AM  
Result Value Ref Range Phosphorus 3.9 2.6 - 4.7 MG/DL Assessment and Plan: This is a 80 yom with AF, diastolic CHF here with pleural effusion and dyspnea Held diuretics given diuresis and fluid out from thoracentesis Echo ordered Added hydralazine Daily BMP Cont other cardiac meds Thank you for the consult, please call with questions Assessment:

## 2019-12-07 NOTE — PROGRESS NOTES
Problem: Falls - Risk of 
Goal: *Absence of Falls Description Document Siomara Tuttle Fall Risk and appropriate interventions in the flowsheet. Outcome: Progressing Towards Goal 
Note: Fall Risk Interventions: 
Mobility Interventions: Assess mobility with egress test, Communicate number of staff needed for ambulation/transfer, OT consult for ADLs, Patient to call before getting OOB, PT Consult for mobility concerns, PT Consult for assist device competence, Utilize walker, cane, or other assistive device, Utilize gait belt for transfers/ambulation, Strengthening exercises (ROM-active/passive) Mentation Interventions: Adequate sleep, hydration, pain control, Door open when patient unattended, Evaluate medications/consider consulting pharmacy, Eyeglasses and hearing aids, Gait belt with transfers/ambulation, Family/sitter at bedside, Familiar objects from home, Increase mobility, More frequent rounding, Reorient patient, Room close to nurse's station, Self-releasing belt, Toileting rounds, Update white board Medication Interventions: Assess postural VS orthostatic hypotension, Evaluate medications/consider consulting pharmacy, Patient to call before getting OOB, Teach patient to arise slowly, Utilize gait belt for transfers/ambulation Elimination Interventions: Call light in reach, Elevated toilet seat, Patient to call for help with toileting needs, Stay With Me (per policy), Toileting schedule/hourly rounds, Urinal in reach, Toilet paper/wipes in reach History of Falls Interventions: Consult care management for discharge planning, Evaluate medications/consider consulting pharmacy, Investigate reason for fall, Room close to nurse's station, Door open when patient unattended, Utilize gait belt for transfer/ambulation Problem: Heart Failure: Day 1 Goal: Medications Outcome: Progressing Towards Goal 
Note:  
Pt received IV lasix today to help pull off extra fluids, BP tolerated IV meds. Pt has no complaints of SOB and no edeme noticed. Will continue to monitor patient. Problem: Pressure Injury - Risk of 
Goal: *Prevention of pressure injury Description Document Yasmany Scale and appropriate interventions in the flowsheet. Outcome: Progressing Towards Goal 
Note: Pressure Injury Interventions: 
Sensory Interventions: Assess need for specialty bed, Assess changes in LOC, Avoid rigorous massage over bony prominences, Check visual cues for pain, Discuss PT/OT consult with provider, Float heels, Keep linens dry and wrinkle-free, Maintain/enhance activity level, Minimize linen layers, Monitor skin under medical devices, Pressure redistribution bed/mattress (bed type), Turn and reposition approx. every two hours (pillows and wedges if needed), Use 30-degree side-lying position Activity Interventions: Assess need for specialty bed, Increase time out of bed, Pressure redistribution bed/mattress(bed type), PT/OT evaluation Mobility Interventions: Assess need for specialty bed, Float heels, HOB 30 degrees or less, Pressure redistribution bed/mattress (bed type), Turn and reposition approx. every two hours(pillow and wedges), PT/OT evaluation Nutrition Interventions: Document food/fluid/supplement intake, Discuss nutritional consult with provider, Offer support with meals,snacks and hydration Friction and Shear Interventions: Apply protective barrier, creams and emollients, Foam dressings/transparent film/skin sealants, Lift sheet, Lift team/patient mobility team, Minimize layers, Sit at 90-degree angle, Transferring/repositioning devices, Transfer aides:transfer board/Bill lift/ceiling lift Bedside shift change report given to Faisal Enrique (oncoming nurse) by Nya Landaverde (offgoing nurse).  Report included the following information SBAR, Kardex, ED Summary, Procedure Summary, Intake/Output, MAR, Recent Results, Med Rec Status, Cardiac Rhythm A fib, Alarm Parameters , and Quality Measures.

## 2019-12-07 NOTE — PROGRESS NOTES
Problem: Falls - Risk of 
Goal: *Absence of Falls Description Document Jesika Blanchard Fall Risk and appropriate interventions in the flowsheet. Outcome: Progressing Towards Goal 
Note: Fall Risk Interventions: 
Mobility Interventions: Assess mobility with egress test, Bed/chair exit alarm, Communicate number of staff needed for ambulation/transfer, Patient to call before getting OOB, PT Consult for assist device competence, Strengthening exercises (ROM-active/passive), Utilize walker, cane, or other assistive device, Utilize gait belt for transfers/ambulation, PT Consult for mobility concerns Mentation Interventions: Adequate sleep, hydration, pain control, Bed/chair exit alarm, Door open when patient unattended, Evaluate medications/consider consulting pharmacy, Familiar objects from home, Family/sitter at bedside, Gait belt with transfers/ambulation, Eyeglasses and hearing aids, Increase mobility, Room close to nurse's station, Update white board, Self-releasing belt, Toileting rounds, Reorient patient, More frequent rounding Medication Interventions: Assess postural VS orthostatic hypotension, Bed/chair exit alarm, Evaluate medications/consider consulting pharmacy, Patient to call before getting OOB, Teach patient to arise slowly, Utilize gait belt for transfers/ambulation Elimination Interventions: Bed/chair exit alarm, Call light in reach, Elevated toilet seat, Stay With Me (per policy), Toilet paper/wipes in reach, Toileting schedule/hourly rounds, Urinal in reach, Patient to call for help with toileting needs History of Falls Interventions: Bed/chair exit alarm, Door open when patient unattended, Evaluate medications/consider consulting pharmacy, Investigate reason for fall, Consult care management for discharge planning Problem: Pressure Injury - Risk of 
Goal: *Prevention of pressure injury Description Document Yasmayn Scale and appropriate interventions in the flowsheet. Outcome: Progressing Towards Goal 
Note: Pressure Injury Interventions: 
Sensory Interventions: Assess changes in LOC, Assess need for specialty bed, Check visual cues for pain, Discuss PT/OT consult with provider, Float heels, Keep linens dry and wrinkle-free, Maintain/enhance activity level, Minimize linen layers, Monitor skin under medical devices, Pad between skin to skin, Turn and reposition approx. every two hours (pillows and wedges if needed) Activity Interventions: Assess need for specialty bed, PT/OT evaluation, Pressure redistribution bed/mattress(bed type), Increase time out of bed Mobility Interventions: Assess need for specialty bed, Float heels, HOB 30 degrees or less, Pressure redistribution bed/mattress (bed type), PT/OT evaluation, Turn and reposition approx. every two hours(pillow and wedges) Nutrition Interventions: Document food/fluid/supplement intake, Discuss nutritional consult with provider, Offer support with meals,snacks and hydration Friction and Shear Interventions: Foam dressings/transparent film/skin sealants, Lift sheet, Minimize layers, Sit at 90-degree angle, Transfer aides:transfer board/Bill lift/ceiling lift, Transferring/repositioning devices 2000: Bedside shift change report given to [address] [place] [address] (oncoming nurse) by Power Jones RN (offgoing nurse). Report included the following information SBAR, Kardex, ED Summary, Procedure Summary, Intake/Output, MAR, Recent Results, Med Rec Status, Cardiac Rhythm A fib, Alarm Parameters  and Quality Measures.

## 2019-12-07 NOTE — PROGRESS NOTES
Bedside and Verbal shift change report given to [address]erm[address] (oncoming nurse) by Barrett Hinton (offgoing nurse). Report included the following information SBAR, Kardex, ED Summary, Intake/Output, MAR, Accordion, Recent Results, and Cardiac Rhythm NST . Last 3 Recorded Weights in this Encounter 12/06/19 6484 12/07/19 0448 12/07/19 0502 Weight: 71.4 kg (157 lb 6.5 oz) 60.6 kg (133 lb 9.6 oz) 65 kg (143 lb 6.4 oz) Most recent weight was done via standing scale versus the two previous bed weights. Problem: Falls - Risk of 
Goal: *Absence of Falls Description Document Rebbecca Persons Fall Risk and appropriate interventions in the flowsheet. Outcome: Progressing Towards Goal 
Note: Fall Risk Interventions: 
Mobility Interventions: Assess mobility with egress test, Communicate number of staff needed for ambulation/transfer, OT consult for ADLs, Patient to call before getting OOB, PT Consult for mobility concerns, PT Consult for assist device competence, Strengthening exercises (ROM-active/passive), Utilize walker, cane, or other assistive device Mentation Interventions: Adequate sleep, hydration, pain control, Increase mobility, Family/sitter at bedside, Evaluate medications/consider consulting pharmacy, Door open when patient unattended, Room close to nurse's station, More frequent rounding, Toileting rounds, Update white board Medication Interventions: Assess postural VS orthostatic hypotension, Evaluate medications/consider consulting pharmacy, Patient to call before getting OOB, Teach patient to arise slowly Elimination Interventions: Call light in reach, Stay With Me (per policy), Patient to call for help with toileting needs, Urinal in reach, Toileting schedule/hourly rounds History of Falls Interventions: Consult care management for discharge planning, Evaluate medications/consider consulting pharmacy, Room close to nurse's station, Utilize gait belt for transfer/ambulation Problem: Heart Failure: Day 1 Goal: Off Pathway (Use only if patient is Off Pathway) Outcome: Progressing Towards Goal 
Goal: Psychosocial 
Outcome: Progressing Towards Goal 
  
Problem: Pressure Injury - Risk of 
Goal: *Prevention of pressure injury Description Document Yasmany Scale and appropriate interventions in the flowsheet. Outcome: Progressing Towards Goal 
Note: Pressure Injury Interventions: 
Sensory Interventions: Assess changes in LOC, Discuss PT/OT consult with provider, Minimize linen layers, Keep linens dry and wrinkle-free, Pressure redistribution bed/mattress (bed type) Activity Interventions: Increase time out of bed, Pressure redistribution bed/mattress(bed type), PT/OT evaluation Mobility Interventions: Pressure redistribution bed/mattress (bed type), PT/OT evaluation Nutrition Interventions: Document food/fluid/supplement intake, Discuss nutritional consult with provider, Offer support with meals,snacks and hydration Friction and Shear Interventions: Minimize layers

## 2019-12-07 NOTE — PROGRESS NOTES
Cardiology Progress Note                                        Admit Date: 12/4/2019 Assessment/Plan:  
 
SOB; much improved after 1650 cc of pleural fluid removed CHF; acute on chronic diastolic; EF is 55 to 66%; improving Bradycardia; underlying ? Afib; asymptomatic; continue current regimen Mahogany Mcintosh is a 80 y.o. male with PROBLEM LIST: 
Patient Active Problem List  
 Diagnosis Date Noted  SOB (shortness of breath) 12/04/2019  Acute on chronic systolic CHF (congestive heart failure) ([facility] 07/25/2019  Colon cancer ([facility].) 07/01/2013 Subjective:  
 
Mahogany Mcintosh reports none. Visit Vitals /52 (BP 1 Location: Left arm, BP Patient Position: Head of bed elevated (Comment degrees)) Pulse (!) 47 Temp 97.2 °F (36.2 °C) Resp 15 Ht 6' 0.01\" (1.829 m) Wt 143 lb 6.4 oz (65 kg) SpO2 99% BMI 19.44 kg/m² Intake/Output Summary (Last 24 hours) at 12/7/2019 1151 Last data filed at 12/7/2019 9332 Gross per 24 hour Intake 300 ml Output  Net 300 ml Objective:  
  
Physical Exam: 
HEENT: Perrla, EOMI Neck: No JVD,  No thyroidmegaly Resp: Lt basilar rales CV: irregular s1s2 No murmur no s3 Abd:Soft, Nontender Ext: No edema Neuro: Alert and oriented; Nonfocal 
Skin: Warm, Dry, Intact Pulses: 2+ DP/PT/Rad Telemetry: AFIB Current Facility-Administered Medications Medication Dose Route Frequency  diphenhydrAMINE (BENADRYL) capsule 25 mg  25 mg Oral QHS PRN  
 apixaban (ELIQUIS) tablet 2.5 mg  2.5 mg Oral Q12H  hydrALAZINE (APRESOLINE) tablet 25 mg  25 mg Oral TID  verapamil ER (CALAN-SR) tablet 90 mg  90 mg Oral DAILY WITH BREAKFAST  dutasteride (AVODART) capsule 0.5 mg  0.5 mg Oral DAILY  levothyroxine (SYNTHROID) tablet 175 mcg  175 mcg Oral ACB  pantoprazole (PROTONIX) tablet 40 mg  40 mg Oral BID  thiamine HCL (B-1) tablet 100 mg  100 mg Oral DAILY  sodium chloride (NS) flush 5-40 mL  5-40 mL IntraVENous Q8H  
  sodium chloride (NS) flush 5-40 mL  5-40 mL IntraVENous PRN  
 acetaminophen (TYLENOL) tablet 650 mg  650 mg Oral Q4H PRN  
 hydrALAZINE (APRESOLINE) 20 mg/mL injection 10 mg  10 mg IntraVENous Q6H PRN  piperacillin-tazobactam (ZOSYN) 3.375 g in 0.9% sodium chloride (MBP/ADV) 100 mL  3.375 g IntraVENous Q12H Data Review:  
Labs:   
Recent Results (from the past 24 hour(s)) METABOLIC PANEL, BASIC Collection Time: 12/07/19  4:21 AM  
Result Value Ref Range Sodium 145 136 - 145 mmol/L Potassium 2.9 (L) 3.5 - 5.1 mmol/L Chloride 112 (H) 97 - 108 mmol/L  
 CO2 26 21 - 32 mmol/L Anion gap 7 5 - 15 mmol/L Glucose 123 (H) 65 - 100 mg/dL BUN 58 (H) 6 - 20 MG/DL Creatinine 2.58 (H) 0.70 - 1.30 MG/DL  
 BUN/Creatinine ratio 22 (H) 12 - 20 GFR est AA 28 (L) >60 ml/min/1.73m2 GFR est non-AA 23 (L) >60 ml/min/1.73m2 Calcium 8.5 8.5 - 10.1 MG/DL  
CBC W/O DIFF Collection Time: 12/07/19  4:21 AM  
Result Value Ref Range WBC 11.5 (H) 4.1 - 11.1 K/uL  
 RBC 3.00 (L) 4.10 - 5.70 M/uL HGB 9.2 (L) 12.1 - 17.0 g/dL HCT 29.3 (L) 36.6 - 50.3 % MCV 97.7 80.0 - 99.0 FL  
 MCH 30.7 26.0 - 34.0 PG  
 MCHC 31.4 30.0 - 36.5 g/dL  
 RDW 17.5 (H) 11.5 - 14.5 % PLATELET 840 927 - 649 K/uL MPV 9.1 8.9 - 12.9 FL  
 NRBC 0.0 0  WBC ABSOLUTE NRBC 0.00 0.00 - 0.01 K/uL

## 2019-12-07 NOTE — PROGRESS NOTES
[facility] [facility] Adult  Hospitalist Group Hospitalist Progress Note Ludivina Ro MD 
Answering service: [phone] or [phone] from in house phone Date of Service:  2019 NAME:  Dalton Rutherford :  1926 MRN:  [id number] Admission Summary:  
 
The patient is a 24-year-old gentleman with past medical history of hypertension, history of colon cancer, atrial fibrillation, CHF, diabetes mellitus type 2, and a skin cancer, who presents to the hospital with shortness of breath . Interval history / Subjective: He said his breathing improving, no left side chest pain Assessment & Plan:  
 
Large pleural effusion L>R likely due to acute on chronic CHF 
-s/p left thoracentesis , patient removed the catheter last night 
-s/p left thoracentesis , pleural fluid culture no growth 
-repeat chest x ray show improvement 
-repeated CT chest  left lower lobe infiltrate. Residual small bilateral effusions. -on iv zosyn  
-prn oxygen support, prn pulse ox monitor - thoracic surgeon on board Acute on chronic diastolic CHF NYHA Class IV  
-Last EF on 19 was 56-60% 
-hold lasix for increasing creatinine and continue hydralazine  
-not on ACEi/ARB due to renal insufficiency  
-echo LV EF 56-60% no regional wall motion abnormality, left ventricular dysfunction, Mild MR  
-cardiologist on board Chronic A Fib, rate controlled 
-bradycardia, hold on verapmile, start eliquis , continue cardiac monitoring Acute metabolic encephalopathy POA 
-improving, patient is conscious and alert, oriented to place and person  
-continue supportive care URIEL on CKD stage IV 
-creatinine trending up, hold lasix 
-avoid nephrotoxin  
-monitor renal function HTN 
-BP normal, hold verapamil, continue hydralazine and monitor BP Anemia of chronic disease  
-H/H stable, monitor cbc Hypothyroidism  
-stable, continue synthroid Recurrent fall 
-fall precaution  
-consult to PT/OT Hx of colon ca  
-s/p surgery 5 years ago 
-stable Hx of basal cell skin ca 
-stable Code status: Full Code DVT prophylaxis: Eliquis Care Plan discussed with: Patient/Family and Nurse Disposition: [facility] Discussed with patient and his daughter at bedside, questions answered 12/6 Daughter,  Shanda Wheeler MPOA, [phone] at bedside, updated plan of care and questions answered 12/5 Hospital Problems  Date Reviewed: 7/25/2019 Codes Class Noted POA  
 SOB (shortness of breath) ICD-10-CM: R06.02 
ICD-9-CM: 786.05  12/4/2019 Unknown Vital Signs:  
 Last 24hrs VS reviewed since prior progress note. Most recent are: 
Visit Vitals /72 (BP 1 Location: Left arm, BP Patient Position: At rest) Pulse (!) 55 Temp 97.6 °F (36.4 °C) Resp 17 Ht 6' 0.01\" (1.829 m) Wt 65 kg (143 lb 6.4 oz) SpO2 98% BMI 19.44 kg/m² Intake/Output Summary (Last 24 hours) at 12/7/2019 1019 Last data filed at 12/7/2019 2394 Gross per 24 hour Intake 450 ml Output  Net 450 ml Physical Examination:  
 
 
     
Constitutional:  No acute distress, cooperative, pleasant ENT:  Oral mucous moist, oropharynx benign. Resp:  Decrease bronchial breath sound on the left, No wheezing/rhonchi/rales. No accessory muscle use CV:  Regular rhythm, normal rate, no murmurs, gallops, rubs GI:  Soft, non distended, non tender. normoactive bowel sounds, no hepatosplenomegaly Musculoskeletal:  No edema Neurologic:  Conscious and alert, oriented to place and person,  moves all extremities. CN II-XII reviewed Skin:  multiple bruises on extremities Data Review:  
 Review and/or order of clinical lab test 
Review and/or order of tests in the radiology section of CPT Review and/or order of tests in the medicine section of CPT Labs: Recent Labs 12/07/19 
0421 12/06/19 
4947 WBC 11.5* 6.8 HGB 9.2* 9.7* HCT 29.3* 31.4*  
 154 Recent Labs 12/07/19 
0421 12/06/19 
0263 12/05/19 
0243 12/04/19 
1240  145 144 142  
K 2.9* 3.6 3.5 4.0  
* 112* 109* 107 CO2 26 26 25 27 BUN 58* 63* 70* 71* CREA 2.58* 2.75* 2.84* 2.78* * 113* 160* 179* CA 8.5 9.0 9.1 8.8 MG  --  2.2  --  2.1 PHOS  --  3.9  --   --   
 
Recent Labs 12/06/19 
0506 12/04/19 
1240 SGOT 26 23 ALT 19 22 AP 62 73 TBILI 0.6 0.4 TP 6.8 7.2 ALB 3.0* 3.3*  
GLOB 3.8 3.9 Recent Labs 12/04/19 
1240 INR 1.1 PTP 11.4* No results for input(s): FE, TIBC, PSAT, FERR in the last 72 hours. Lab Results Component Value Date/Time Folate 9.8 07/26/2019 06:03 AM  
  
No results for input(s): PH, PCO2, PO2 in the last 72 hours. Recent Labs 12/04/19 
1240 TROIQ <0.05 Lab Results Component Value Date/Time Cholesterol, total 142 07/26/2019 06:03 AM  
 HDL Cholesterol 79 07/26/2019 06:03 AM  
 LDL, calculated 56.2 07/26/2019 06:03 AM  
 Triglyceride 34 07/26/2019 06:03 AM  
 CHOL/HDL Ratio 1.8 07/26/2019 06:03 AM  
 
Lab Results Component Value Date/Time Glucose (POC) 148 (H) 07/11/2013 09:36 PM  
 
Lab Results Component Value Date/Time Color YELLOW/STRAW 07/26/2019 12:58 PM  
 Appearance CLEAR 07/26/2019 12:58 PM  
 Specific gravity 1.008 07/26/2019 12:58 PM  
 pH (UA) 7.5 07/26/2019 12:58 PM  
 Protein NEGATIVE  07/26/2019 12:58 PM  
 Glucose NEGATIVE  07/26/2019 12:58 PM  
 Ketone NEGATIVE  07/26/2019 12:58 PM  
 Bilirubin NEGATIVE  07/26/2019 12:58 PM  
 Urobilinogen 1.0 07/26/2019 12:58 PM  
 Nitrites NEGATIVE  07/26/2019 12:58 PM  
 Leukocyte Esterase NEGATIVE  07/26/2019 12:58 PM  
 
 
 
Medications Reviewed:  
 
Current Facility-Administered Medications Medication Dose Route Frequency  potassium chloride 10 mEq in 100 ml IVPB  10 mEq IntraVENous Q1H  
  hydrALAZINE (APRESOLINE) tablet 25 mg  25 mg Oral TID  furosemide (LASIX) injection 40 mg  40 mg IntraVENous DAILY  verapamil ER (CALAN-SR) tablet 90 mg  90 mg Oral DAILY WITH BREAKFAST  dutasteride (AVODART) capsule 0.5 mg  0.5 mg Oral DAILY  levothyroxine (SYNTHROID) tablet 175 mcg  175 mcg Oral ACB  pantoprazole (PROTONIX) tablet 40 mg  40 mg Oral BID  thiamine HCL (B-1) tablet 100 mg  100 mg Oral DAILY  sodium chloride (NS) flush 5-40 mL  5-40 mL IntraVENous Q8H  
 sodium chloride (NS) flush 5-40 mL  5-40 mL IntraVENous PRN  
 acetaminophen (TYLENOL) tablet 650 mg  650 mg Oral Q4H PRN  
 hydrALAZINE (APRESOLINE) 20 mg/mL injection 10 mg  10 mg IntraVENous Q6H PRN  piperacillin-tazobactam (ZOSYN) 3.375 g in 0.9% sodium chloride (MBP/ADV) 100 mL  3.375 g IntraVENous Q12H  
 
______________________________________________________________________ EXPECTED LENGTH OF STAY: - - - 
ACTUAL LENGTH OF STAY:          3 Baron Dimitrios MD

## 2019-12-07 NOTE — PROGRESS NOTES
Mr. Bryan Gomez was admitted with a large left pleural effusion. He had a repeat thoracentesis yesterday which drained an additional 1.6L. No acute events overnight. Does not report any respiratory symptoms 
  
Afebrile HD stable 
  
On exam he is resting comfortably Alert and oriented Lungs CTA b/l I have personally reveiwed his most recent CXR which shows resolution of effusion. 
  
Diagnoses  1- Exudative left pleural effusion 
  
Mr. Bryan Gomez has an exudative left effusion of uncertain etiology. The effusion has been fully drained. We recommend a repeat Chest CT to evaluate for any intrathoracic process that may have been obscured by the effusion. Otherwise, from a Thoracic standpoint he does not require any surgical intervention and with complete drainage should be able to go home and be managed as an outpatient.

## 2019-12-08 NOTE — PROGRESS NOTES
Transitions of Care: 
Per nursing they questioned if patient could return to his residence. Patient has been on hydralazine for b/p control. I did call Emory Espinoza at [facility] the nursing supervisior with an update on patient.   Care management will be in communication with facility in am.

## 2019-12-08 NOTE — PROGRESS NOTES
2953: Paged hospitalist regarding patients BP unchanging. Orders received to give prn hydralazine dose and if unchanged to allow daytime provider to make further decisions due to patient's fluid status . 0025: Paged hospitalist regarding patient's BP maintaining in the 180s. Orders received for clonidine. Bedside and Verbal shift change report given to [id number] Saundra Bermudez (oncoming nurse) by Vaishali José (offgoing nurse). Report included the following information SBAR, Kardex, Intake/Output, MAR, Accordion, Recent Results, and Cardiac Rhythm NSB . Last 3 Recorded Weights in this Encounter 12/07/19 0448 12/07/19 0502 12/08/19 6307 Weight: 60.6 kg (133 lb 9.6 oz) 65 kg (143 lb 6.4 oz) 66.8 kg (147 lb 4.3 oz) Patient was weighed in bed with 1 pillow, blanket, and sheet. Not via standing scale as done in previous weight (65 kg). Problem: Activity Intolerance Goal: *Oxygen saturation during activity within specified parameters Outcome: Progressing Towards Goal 
  
Problem: Activity Intolerance Goal: *Oxygen saturation during activity within specified parameters Outcome: Progressing Towards Goal 
Goal: *Able to remain out of bed as prescribed Outcome: Progressing Towards Goal 
  
Problem: Falls - Risk of 
Goal: *Absence of Falls Description Document Nirav Redmond Fall Risk and appropriate interventions in the flowsheet. Outcome: Progressing Towards Goal 
Note: Fall Risk Interventions: 
Mobility Interventions: Communicate number of staff needed for ambulation/transfer, OT consult for ADLs, Patient to call before getting OOB, PT Consult for assist device competence, Strengthening exercises (ROM-active/passive), Utilize walker, cane, or other assistive device Mentation Interventions: Adequate sleep, hydration, pain control, Evaluate medications/consider consulting pharmacy, Family/sitter at bedside, Increase mobility, More frequent rounding, Reorient patient, Toileting rounds, Update white board Medication Interventions: Assess postural VS orthostatic hypotension, Evaluate medications/consider consulting pharmacy, Patient to call before getting OOB, Teach patient to arise slowly, Utilize gait belt for transfers/ambulation Elimination Interventions: Call light in reach, Patient to call for help with toileting needs, Stay With Me (per policy), Toilet paper/wipes in reach, Toileting schedule/hourly rounds, Urinal in reach History of Falls Interventions: Consult care management for discharge planning, Evaluate medications/consider consulting pharmacy, Utilize gait belt for transfer/ambulation Problem: Heart Failure: Day 1 Goal: Activity/Safety Outcome: Progressing Towards Goal 
Goal: Treatments/Interventions/Procedures Outcome: Progressing Towards Goal 
Note:  
Daily weight obtained

## 2019-12-08 NOTE — PROGRESS NOTES
2355:Paged and spoke to hospitalist due to pt's SBP steadily increasing to 180-190s after scheduled hydralazine and PRN hydralazine.

## 2019-12-08 NOTE — PROGRESS NOTES
Cardiology Progress Note                                        Admit Date: 12/4/2019 Assessment/Plan:  
 
CHF; acute on chronic systolic HF; improved; he could go home Pleural effusion; s/p therapeutic tap with much resolution of this condition ARF/CKD; improving HTN; will resume Tommy Vinson is a 80 y.o. male with PROBLEM LIST: 
Patient Active Problem List  
 Diagnosis Date Noted  SOB (shortness of breath) 12/04/2019  Acute on chronic systolic CHF (congestive heart failure) ([facility].) 07/25/2019  Colon cancer ([facility] 75.) 07/01/2013 Subjective:  
 
Salima Vinson reports none. Visit Vitals /79 (BP 1 Location: Left arm, BP Patient Position: Sitting) Pulse (!) 59 Temp 97.3 °F (36.3 °C) Resp 16 Ht 6' 0.01\" (1.829 m) Wt 147 lb 4.3 oz (66.8 kg) SpO2 97% BMI 19.97 kg/m² Intake/Output Summary (Last 24 hours) at 12/8/2019 2853 Last data filed at 12/8/2019 4941 Gross per 24 hour Intake 470 ml Output 300 ml Net 170 ml Objective:  
  
Physical Exam: 
HEENT: Perrla, EOMI Neck: No JVD,  No thyroidmegaly Resp: Lt basilar rales CV: RRR s1s2 No murmur no s3 Abd:Soft, Nontender Ext: No edema Neuro: Alert and oriented; Nonfocal 
Skin: Warm, Dry, Intact Pulses: 2+ DP/PT/Rad Telemetry: normal sinus rhythm Current Facility-Administered Medications Medication Dose Route Frequency  hydrALAZINE (APRESOLINE) tablet 50 mg  50 mg Oral TID  diphenhydrAMINE (BENADRYL) capsule 25 mg  25 mg Oral QHS PRN  
 apixaban (ELIQUIS) tablet 2.5 mg  2.5 mg Oral Q12H  verapamil ER (CALAN-SR) tablet 90 mg  90 mg Oral DAILY WITH BREAKFAST  dutasteride (AVODART) capsule 0.5 mg  0.5 mg Oral DAILY  levothyroxine (SYNTHROID) tablet 175 mcg  175 mcg Oral ACB  pantoprazole (PROTONIX) tablet 40 mg  40 mg Oral BID  thiamine HCL (B-1) tablet 100 mg  100 mg Oral DAILY  sodium chloride (NS) flush 5-40 mL  5-40 mL IntraVENous Q8H  
  sodium chloride (NS) flush 5-40 mL  5-40 mL IntraVENous PRN  
 acetaminophen (TYLENOL) tablet 650 mg  650 mg Oral Q4H PRN  
 hydrALAZINE (APRESOLINE) 20 mg/mL injection 10 mg  10 mg IntraVENous Q6H PRN  piperacillin-tazobactam (ZOSYN) 3.375 g in 0.9% sodium chloride (MBP/ADV) 100 mL  3.375 g IntraVENous Q12H Data Review:  
Labs:   
Recent Results (from the past 24 hour(s)) METABOLIC PANEL, BASIC Collection Time: 12/08/19  6:22 AM  
Result Value Ref Range Sodium 144 136 - 145 mmol/L Potassium 3.5 3.5 - 5.1 mmol/L Chloride 114 (H) 97 - 108 mmol/L  
 CO2 25 21 - 32 mmol/L Anion gap 5 5 - 15 mmol/L Glucose 119 (H) 65 - 100 mg/dL BUN 49 (H) 6 - 20 MG/DL Creatinine 2.42 (H) 0.70 - 1.30 MG/DL  
 BUN/Creatinine ratio 20 12 - 20 GFR est AA 30 (L) >60 ml/min/1.73m2 GFR est non-AA 25 (L) >60 ml/min/1.73m2 Calcium 8.7 8.5 - 10.1 MG/DL  
CBC W/O DIFF Collection Time: 12/08/19  6:22 AM  
Result Value Ref Range WBC 8.3 4.1 - 11.1 K/uL  
 RBC 3.20 (L) 4.10 - 5.70 M/uL HGB 9.7 (L) 12.1 - 17.0 g/dL HCT 31.3 (L) 36.6 - 50.3 % MCV 97.8 80.0 - 99.0 FL  
 MCH 30.3 26.0 - 34.0 PG  
 MCHC 31.0 30.0 - 36.5 g/dL  
 RDW 17.5 (H) 11.5 - 14.5 % PLATELET 947 196 - 393 K/uL MPV 8.8 (L) 8.9 - 12.9 FL  
 NRBC 0.0 0  WBC ABSOLUTE NRBC 0.00 0.00 - 0.01 K/uL

## 2019-12-08 NOTE — PROGRESS NOTES
[facility] Adult  Hospitalist Group Hospitalist Progress Note Izzy Clark MD 
Answering service: [phone] or [phone] from in house phone Date of Service:  2019 NAME:  Abril Ordaz :  1926 MRN:  [id number] Admission Summary:  
 
The patient is a 51-year-old gentleman with past medical history of hypertension, history of colon cancer, atrial fibrillation, CHF, diabetes mellitus type 2, and a skin cancer, who presents to the hospital with shortness of breath . Interval history / Subjective: He said his breathing improving, no left side chest pain Per his sitter he was restless last night 10 pm to 3 am and then slept well, he is calm now Assessment & Plan:  
 
Large pleural effusion L>R likely due to acute on chronic CHF 
-s/p left thoracentesis , patient removed the catheter last night 
-s/p left thoracentesis , pleural fluid culture no growth 
-repeat chest x ray show improvement 
-repeated CT chest  left lower lobe infiltrate. Residual small bilateral effusions. -on iv zosyn  
-prn oxygen support, prn pulse ox monitor  
-thoracic surgeon on board Acute on chronic diastolic CHF NYHA Class IV  
-Last EF on 19 was 56-60% 
-hold lasix for increasing creatinine and increase hydralazine  
-not on ACEi/ARB due to renal insufficiency  
-echo LV EF 56-60% no regional wall motion abnormality, left ventricular dysfunction, Mild MR  
-cardiologist on board Chronic A Fib, rate controlled 
-bradycardia, on verapmile, restarted eliquis , continue cardiac monitoring Acute metabolic encephalopathy POA 
-improving, patient is conscious and alert, oriented to place and person  
-off sitter 
-continue supportive care URIEL on CKD stage IV 
-creatinine improving, hold lasix 
-avoid nephrotoxin  
-monitor renal function HTN 
 -BP not at goal, continue verapamil, increase hydralazine and add home terazosin equivalent doxazosin, monitor BP Anemia of chronic disease  
-H/H stable, monitor cbc Hypothyroidism  
-stable, continue synthroid Recurrent fall 
-fall precaution  
-consult to PT/OT Hx of colon ca  
-s/p surgery 5 years ago 
-stable Hx of basal cell skin ca 
-stable Code status: Full Code DVT prophylaxis: Eliquis Care Plan discussed with: Patient/Family and Nurse Disposition: [address] Discussed with patient and his daughter at bedside, questions answered 12/7 I called and discussed with his Shelbi CHAO, [phone], updated plan of care and questions answered 12/8 Hospital Problems  Date Reviewed: 7/25/2019 Codes Class Noted POA  
 SOB (shortness of breath) ICD-10-CM: R06.02 
ICD-9-CM: 786.05  12/4/2019 Unknown Vital Signs:  
 Last 24hrs VS reviewed since prior progress note. Most recent are: 
Visit Vitals /79 (BP 1 Location: Left arm, BP Patient Position: Sitting) Pulse (!) 59 Temp 97.3 °F (36.3 °C) Resp 16 Ht 6' 0.01\" (1.829 m) Wt 66.8 kg (147 lb 4.3 oz) SpO2 97% BMI 19.97 kg/m² Intake/Output Summary (Last 24 hours) at 12/8/2019 4059 Last data filed at 12/8/2019 2737 Gross per 24 hour Intake 710 ml Output 300 ml Net 410 ml Physical Examination:  
 
 
     
Constitutional:  No acute distress, cooperative, pleasant ENT:  Oral mucous moist, oropharynx benign. Resp:  Decrease bronchial breath sound on the left, No wheezing/rhonchi/rales. No accessory muscle use CV:  Regular rhythm, normal rate, no murmurs, gallops, rubs GI:  Soft, non distended, non tender. normoactive bowel sounds, no hepatosplenomegaly Musculoskeletal:  No edema Neurologic:  Conscious and alert, oriented to place and person,  moves all extremities. CN II-XII reviewed Skin:  multiple bruises on extremities Data Review:  
 Review and/or order of clinical lab test 
Review and/or order of tests in the radiology section of CPT Review and/or order of tests in the medicine section of CPT Labs:  
 
Recent Labs 12/08/19 
0622 12/07/19 0421 WBC 8.3 11.5* HGB 9.7* 9.2* HCT 31.3* 29.3*  
 160 Recent Labs 12/08/19 
6772 12/07/19 
0421 12/06/19 
8258  145 145  
K 3.5 2.9* 3.6 * 112* 112* CO2 25 26 26 BUN 49* 58* 63* CREA 2.42* 2.58* 2.75* * 123* 113* CA 8.7 8.5 9.0 MG  --   --  2.2 PHOS  --   --  3.9 Recent Labs 12/06/19 
2775 SGOT 26 ALT 19 AP 62 TBILI 0.6 TP 6.8 ALB 3.0*  
GLOB 3.8 No results for input(s): INR, PTP, APTT, INREXT, INREXT in the last 72 hours. No results for input(s): FE, TIBC, PSAT, FERR in the last 72 hours. Lab Results Component Value Date/Time Folate 9.8 07/26/2019 06:03 AM  
  
No results for input(s): PH, PCO2, PO2 in the last 72 hours. No results for input(s): CPK, CKNDX, TROIQ in the last 72 hours. No lab exists for component: CPKMB Lab Results Component Value Date/Time Cholesterol, total 142 07/26/2019 06:03 AM  
 HDL Cholesterol 79 07/26/2019 06:03 AM  
 LDL, calculated 56.2 07/26/2019 06:03 AM  
 Triglyceride 34 07/26/2019 06:03 AM  
 CHOL/HDL Ratio 1.8 07/26/2019 06:03 AM  
 
Lab Results Component Value Date/Time Glucose (POC) 148 (H) 07/11/2013 09:36 PM  
 
Lab Results Component Value Date/Time Color YELLOW/STRAW 07/26/2019 12:58 PM  
 Appearance CLEAR 07/26/2019 12:58 PM  
 Specific gravity 1.008 07/26/2019 12:58 PM  
 pH (UA) 7.5 07/26/2019 12:58 PM  
 Protein NEGATIVE  07/26/2019 12:58 PM  
 Glucose NEGATIVE  07/26/2019 12:58 PM  
 Ketone NEGATIVE  07/26/2019 12:58 PM  
 Bilirubin NEGATIVE  07/26/2019 12:58 PM  
 Urobilinogen 1.0 07/26/2019 12:58 PM  
 Nitrites NEGATIVE  07/26/2019 12:58 PM  
 Leukocyte Esterase NEGATIVE  07/26/2019 12:58 PM  
 
 
 
Medications Reviewed: Current Facility-Administered Medications Medication Dose Route Frequency  diphenhydrAMINE (BENADRYL) capsule 25 mg  25 mg Oral QHS PRN  
 apixaban (ELIQUIS) tablet 2.5 mg  2.5 mg Oral Q12H  hydrALAZINE (APRESOLINE) tablet 25 mg  25 mg Oral TID  verapamil ER (CALAN-SR) tablet 90 mg  90 mg Oral DAILY WITH BREAKFAST  dutasteride (AVODART) capsule 0.5 mg  0.5 mg Oral DAILY  levothyroxine (SYNTHROID) tablet 175 mcg  175 mcg Oral ACB  pantoprazole (PROTONIX) tablet 40 mg  40 mg Oral BID  thiamine HCL (B-1) tablet 100 mg  100 mg Oral DAILY  sodium chloride (NS) flush 5-40 mL  5-40 mL IntraVENous Q8H  
 sodium chloride (NS) flush 5-40 mL  5-40 mL IntraVENous PRN  
 acetaminophen (TYLENOL) tablet 650 mg  650 mg Oral Q4H PRN  
 hydrALAZINE (APRESOLINE) 20 mg/mL injection 10 mg  10 mg IntraVENous Q6H PRN  piperacillin-tazobactam (ZOSYN) 3.375 g in 0.9% sodium chloride (MBP/ADV) 100 mL  3.375 g IntraVENous Q12H  
 
______________________________________________________________________ EXPECTED LENGTH OF STAY: - - - 
ACTUAL LENGTH OF STAY:          4 Sam Logan MD

## 2019-12-09 NOTE — DISCHARGE INSTRUCTIONS
Discharge SNF/Rehab Instructions/LTAC       PATIENT ID: Argentina Bernal  MRN: [id number]   YOB: 1926    DATE OF ADMISSION: 12/4/2019 12:13 PM    DATE OF DISCHARGE: 12/9/2019    PRIMARY CARE PROVIDER: Jossy Barney DO       ATTENDING PHYSICIAN: Carmen Ramírez MD  DISCHARGING PROVIDER: Sharee Zavala MD     To contact this individual call [phone] and ask the  to page. If unavailable ask to be transferred the Adult Hospitalist Department. CONSULTATIONS: IP CONSULT TO HOSPITALIST  IP CONSULT TO THORACIC SURGERY  IP CONSULT TO CARDIOLOGY    PROCEDURES/SURGERIES: * No surgery found *    ADMITTING [address] [place] [address] COURSE:     The patient is a 80-year-old gentleman with past medical history of hypertension, history of colon cancer, atrial fibrillation, CHF, diabetes mellitus type 2, and a skin cancer, who presents to the hospital with shortness of breath . Large pleural effusion L>R likely due to acute on chronic CHF  -s/p left thoracentesis 12/4, patient removed the catheter last night  -s/p left thoracentesis 12/6, pleural fluid culture no growth  -repeat chest x ray show improvement  -repeated CT chest 12/7 left lower lobe infiltrate. Residual small bilateral effusions.   -on iv zosyn, afebrile and no leukocytosis , will switch to po antibiotics with floraQ  -prn oxygen support, prn pulse ox monitor   -SpO2 96-99% on RA  -thoracic surgeon on board  Acute on chronic diastolic CHF NYHA Class IV   -Last EF on 7/26/19 was 56-60%  -Restart home lasix 40 mg po bid and hydralazine 50 mg tid   -not on ACEi/ARB due to renal insufficiency   -echo LV EF 56-60% no regional wall motion abnormality, left ventricular dysfunction, Mild MR   -cardiologist on board  Chronic A Fib, rate controlled  -bradycardia improved, continue verapmile and eliquis  Acute metabolic encephalopathy POA  -improved, patient is conscious and alert, oriented to place and person   -had on confusion, off sitter  -continue supportive care  URIEL on CKD stage IV  -creatinine improving, hold lasix  -avoid nephrotoxin   -monitor renal function   HTN  -BP not at goal, continue verapamil, increased hydralazine, resume home terazosin and monitor BP  Anemia of chronic disease   -H/H stable, monitor cbc   Hypothyroidism   -stable, continue synthroid   Recurrent fall  -fall precaution   -consult to PT/OT  Hx of colon ca   -s/p surgery 5 years ago  -stable  Hx of basal cell skin ca  -stable     Code status: Full Code   DVT prophylaxis: Eliquis       DISCHARGE DIAGNOSES / PLAN:      Large pleural effusion L>R likely due to acute on chronic CHF  -s/p left thoracentesis 12/4, patient removed the catheter last night  -s/p left thoracentesis 12/6, pleural fluid culture no growth  -continue lasix 40 mg bid  -Augmentin 500-125 mg bid for 5 more days with liliana Q  -outpatient follow up with thoracic surgeon   Acute on chronic diastolic CHF NYHA Class III on discharge  -continue lasix 40 mg po bid and hydralazine 50 mg tid   -not on ACEi/ARB due to renal insufficiency   -not on BB unknown reason   -outpatient follow up with cardiologist  Chronic A Fib, rate controlled  -continue verapmile and eliquis  Acute metabolic encephalopathy POA  -improved  -continue supportive care  URIEL on CKD stage IV  -creatinine improving,  -avoid nephrotoxin   HTN  -continue verapamil, increased hydralazine, resume home terazosin and monitor BP  Anemia of chronic disease   -H/H stable, monitor cbc   Hypothyroidism   -stable, continue synthroid   Recurrent fall  -fall precaution   -consult to PT/OT  Hx of colon ca   -s/p surgery 5 years ago  -stable  Hx of basal cell skin ca  -stable    PENDING TEST RESULTS:   At the time of discharge the following test results are still pending: None     FOLLOW UP APPOINTMENTS:    Follow-up Information     Follow up With Specialties Details Why Contact Info   1.  Juliocesar Olson,  Internal Medicine In one week  [facility] [address]/[facility] [address] [address]  [phone]       2. Call and make appointment to see your Cardiologist in 1 week  3. Follow up with thoracic surgeon, Qiana Covarrubias MD, in 2 weeks      ADDITIONAL CARE RECOMMENDATIONS: None    DIET: Cardiac Diet    TUBE FEEDING INSTRUCTIONS: None    OXYGEN / BiPAP SETTINGS: None    ACTIVITY: Activity as tolerated    WOUND CARE: None    EQUIPMENT needed: None      DISCHARGE MEDICATIONS:   See Medication Reconciliation Form      NOTIFY YOUR PHYSICIAN FOR ANY OF THE FOLLOWING:   Fever over 101 degrees for 24 hours. Chest pain, shortness of breath, fever, chills, nausea, vomiting, diarrhea, change in mentation, falling, weakness, bleeding. Severe pain or pain not relieved by medications. Or, any other signs or symptoms that you may have questions about.     DISPOSITION:    Home With:   OT  PT  HH  RN       SNF/Inpatient Rehab/LTAC   x Independent/assisted living    Hospice    Other:       PATIENT CONDITION AT DISCHARGE:     Functional status    Poor     Deconditioned    x Independent      Cognition   x  Lucid     Forgetful     Dementia      Catheters/lines (plus indication)    William     PICC     PEG    x None      Code status   x  Full code     DNR      PHYSICAL EXAMINATION AT DISCHARGE:   Refer to Progress Note      CHRONIC MEDICAL DIAGNOSES:  Problem List as of 12/9/2019 Date Reviewed: 7/25/2019          Codes Class Noted - Resolved    SOB (shortness of breath) ICD-10-CM: R06.02  ICD-9-CM: 786.05  12/4/2019 - Present        Acute on chronic systolic CHF (congestive heart failure) ([facility] ICD-10-CM: I50.23  ICD-9-CM: 428.23, 428.0  7/25/2019 - Present        Colon cancer ([facility] ICD-10-CM: C18.9  ICD-9-CM: 153.9  7/1/2013 - Present                CDMP Checked:   Yes x     PROBLEM LIST Updated:  Yes x         Signed:   Ankush Benito MD  12/9/2019  12:55 PM    HEART FAILURE INSTRUCTIONS    Please weigh patient daily and report weight gain of 3# in one day or 5# in one week to  Iris at [phone]  Please give patient low-salt (1500-2000mg/day) cardiac diet. Thank you!   Sebas CAGE (Healthy Understanding of Goals) Transitional Care Team at [facility]  [phone]

## 2019-12-09 NOTE — PROGRESS NOTES
Problem: Activity Intolerance Goal: *Oxygen saturation during activity within specified parameters Outcome: Progressing Towards Goal 
Goal: *Able to remain out of bed as prescribed Outcome: Progressing Towards Goal 
  
Problem: Patient Education: Go to Patient Education Activity Goal: Patient/Family Education Outcome: Progressing Towards Goal 
  
Problem: Activity Intolerance Goal: *Oxygen saturation during activity within specified parameters Outcome: Progressing Towards Goal 
Goal: *Able to remain out of bed as prescribed Outcome: Progressing Towards Goal 
  
Problem: Patient Education: Go to Patient Education Activity Goal: Patient/Family Education Outcome: Progressing Towards Goal 
  
Problem: Falls - Risk of 
Goal: *Absence of Falls Description Document Enzo Barlow Fall Risk and appropriate interventions in the flowsheet. Outcome: Progressing Towards Goal 
Note: Fall Risk Interventions: 
Mobility Interventions: Bed/chair exit alarm, Communicate number of staff needed for ambulation/transfer, Assess mobility with egress test, Patient to call before getting OOB Mentation Interventions: Adequate sleep, hydration, pain control, Bed/chair exit alarm, Evaluate medications/consider consulting pharmacy, Reorient patient, More frequent rounding, Increase mobility Medication Interventions: Bed/chair exit alarm, Evaluate medications/consider consulting pharmacy, Patient to call before getting OOB, Teach patient to arise slowly Elimination Interventions: Bed/chair exit alarm, Call light in reach, Patient to call for help with toileting needs History of Falls Interventions: Bed/chair exit alarm, Evaluate medications/consider consulting pharmacy Problem: Heart Failure: Day 1 Goal: Medications Outcome: Progressing Towards Goal 
Goal: *Oxygen saturation within defined limits Outcome: Progressing Towards Goal 
  
Problem: Diabetes Self-Management Goal: *Disease process and treatment process Description Define diabetes and identify own type of diabetes; list 3 options for treating diabetes. Outcome: Progressing Towards Goal 
Goal: *Incorporating nutritional management into lifestyle Description Describe effect of type, amount and timing of food on blood glucose; list 3 methods for planning meals. Outcome: Progressing Towards Goal 
  
Problem: Patient Education: Go to Patient Education Activity Goal: Patient/Family Education Outcome: Progressing Towards Goal

## 2019-12-09 NOTE — PROGRESS NOTES
Occupational Therapy Completed chart review and discussed patient with PT. Patient drowsy and did not open his eyes during attempt for PT. Will hold for OT and continue to follow up. Based on chart review recommend dc to aspect of his community where they are able to provide 24 hour supervision and assistance with ADL tasks. Thank you, Xavier Phillips, OT

## 2019-12-09 NOTE — PROGRESS NOTES
Pharmacist Discharge Medication Reconciliation Discharging Provider: Kenyon Macario Significant PMH:  
Past Medical History:  
Diagnosis Date Arrhythmia   
 irrigular heart beat- A FIB Cancer [facility]) 2013 COLON Cancer ([facility] SKIN Colon cancer ([facility] 7/1/2013 Hypertension Chief Complaint for this Admission: Chief Complaint Patient presents with Shortness of Breath Pleural Effusion Allergies: Patient has no known allergies. Discharge Medications:  
Current Discharge Medication List  
  
 
START taking these medications Details  
hydrALAZINE (APRESOLINE) 50 mg tablet Take 1 Tab by mouth three (3) times daily. Qty: 90 Tab, Refills: 0  
  
amoxicillin-clavulanate (AUGMENTIN) 500-125 mg per tablet Take 1 Tab by mouth two (2) times a day for 5 days. Qty: 10 Tab, Refills: 0  
  
L.acidoph & parac-S.therm-Bifido (CHUY Q2/RISAQUAD-2) 16 billion cell cap cap Take 1 Cap by mouth daily for 10 days. Qty: 10 Cap, Refills: 0 CONTINUE these medications which have NOT CHANGED Details  
furosemide (LASIX) 40 mg tablet Take 40 mg by mouth two (2) times a day. loperamide (IMODIUM) 2 mg capsule Take 2 mg by mouth daily. potassium chloride SR (KLOR-CON 10) 10 mEq tablet Take 20 mEq by mouth daily. guaiFENesin-dextromethorphan (ROBAFEN DM COUGH)  mg/5 mL liqd Take 30 mL by mouth every four (4) hours as needed. verapamil HCl (VERAPAMIL PO) Take  by mouth. Verapamil ER 24 hour 90mg po once daily diphenhydrAMINE (BENADRYL ALLERGY) 25 mg tablet Take 25 mg by mouth nightly as needed for Sleep.  
  
apixaban (ELIQUIS) 2.5 mg tablet Take 2.5 mg by mouth two (2) times a day. Indications: Atrial Fibrillation  
  
ferrous sulfate 325 mg (65 mg iron) tablet Take 325 mg by mouth two (2) times a day. levothyroxine (SYNTHROID) 175 mcg tablet Take 175 mcg by mouth Daily (before breakfast). melatonin 10 mg tab Take 1 Tab by mouth nightly. omeprazole (PRILOSEC) 20 mg capsule Take 20 mg by mouth two (2) times a day. thiamine HCL (B-1) 100 mg tablet Take 100 mg by mouth daily. triamcinolone acetonide (KENALOG) 0.1 % topical cream Apply  to affected area two (2) times daily as needed for Skin Irritation or Itching. use thin layer  
  
cholecalciferol, vitamin D3, (VITAMIN D3) 2,000 unit tab Take 1 Tab by mouth daily. dutasteride (AVODART) 0.5 mg capsule Take 0.5 mg by mouth daily. terazosin (HYTRIN) 10 mg capsule Take 10 mg by mouth daily. Indications: high blood pressure The patient's chart, MAR and AVS were reviewed by Denise Clark, DONNELLD.

## 2019-12-09 NOTE — PROGRESS NOTES
[address] [facility] Adult  Hospitalist Group Hospitalist Progress Note Mayra Adair MD 
Answering service: [phone] OR [phone] from in house phone Date of Service:  2019 NAME:  Michelle Wing :  1926 MRN:  [id number] Admission Summary:  
 
The patient is a 51-year-old gentleman with past medical history of hypertension, history of colon cancer, atrial fibrillation, CHF, diabetes mellitus type 2, and a skin cancer, who presents to the hospital with shortness of breath . Interval history / Subjective: He said his breathing improed, no left side chest pain Per his sitter he was restless last night 10 pm to 3 am and then slept well, he is calm now Assessment & Plan:  
 
Large pleural effusion L>R likely due to acute on chronic CHF 
-s/p left thoracentesis , patient removed the catheter last night 
-s/p left thoracentesis , pleural fluid culture no growth 
-repeat chest x ray show improvement 
-repeated CT chest  left lower lobe infiltrate. Residual small bilateral effusions. -on iv zosyn, afebrile, leukocytosis resolved, will switch to po abx   
-prn oxygen support, prn pulse ox monitor  
-thoracic surgeon on board Acute on chronic diastolic CHF NYHA Class IV  
-Last EF on 19 was 56-60% 
-lasix is held for increasing creatinine, no improving, resume lasix, increased hydralazine  
-not on ACEi/ARB due to renal insufficiency  
-echo LV EF 56-60% no regional wall motion abnormality, left ventricular dysfunction, Mild MR  
-cardiologist on board Chronic A Fib, rate controlled 
-continue on verapmile, restarted eliquis , continue cardiac monitoring Acute metabolic encephalopathy POA 
-improving, patient is conscious and alert, oriented to place and person  
-has on and off confusion 
-off sitter 
-continue supportive care URIEL on CKD stage IV 
 -creatinine improving, 
-avoid nephrotoxin  
-monitor renal function HTN 
-BP improving, continue verapamil, increased hydralazine and added home terazosin equivalent doxazosin, monitor BP Anemia of chronic disease  
-H/H stable, monitor cbc Hypothyroidism  
-stable, continue synthroid Recurrent fall 
-fall precaution  
-consult to PT/OT Hx of colon ca  
-s/p surgery 5 years ago 
-stable Hx of basal cell skin ca 
-stable Code status: Full Code DVT prophylaxis: Eliquis Care Plan discussed with: Patient/Family and Nurse Disposition: [address] Discussed with patient and his daughter at bedside, questions answered 12/7 I called and discussed with his Yudy Pal MPOA, [phone], updated plan of care and discharge plan, questions answered 12/9 Hospital Problems  Date Reviewed: 7/25/2019 Codes Class Noted POA  
 SOB (shortness of breath) ICD-10-CM: R06.02 
ICD-9-CM: 786.05  12/4/2019 Unknown Vital Signs:  
 Last 24hrs VS reviewed since prior progress note. Most recent are: 
Visit Vitals /60 (BP 1 Location: Left arm, BP Patient Position: At rest) Pulse (!) 52 Temp 97.1 °F (36.2 °C) Resp 18 Ht 6' 0.01\" (1.829 m) Wt 66.6 kg (146 lb 13.2 oz) SpO2 92% BMI 19.91 kg/m² Intake/Output Summary (Last 24 hours) at 12/9/2019 1135 Last data filed at 12/9/2019 1781 Gross per 24 hour Intake 490 ml Output 280 ml Net 210 ml Physical Examination:  
 
 
     
Constitutional:  No acute distress, cooperative, pleasant ENT:  Oral mucous moist, oropharynx benign. Resp:  Decrease bronchial breath sound on the left, No wheezing/rhonchi/rales. No accessory muscle use CV:  Regular rhythm, normal rate, no murmurs, gallops, rubs GI:  Soft, non distended, non tender. normoactive bowel sounds, no hepatosplenomegaly Musculoskeletal:  No edema Neurologic:  Conscious and alert, oriented to place, time and person,  moves all extremities. CN II-XII reviewed Skin:  multiple bruises on extremities Data Review:  
 Review and/or order of clinical lab test 
Review and/or order of tests in the radiology section of CPT Review and/or order of tests in the medicine section of CPT Labs:  
 
Recent Labs 12/09/19 
0522 12/08/19 
7766 WBC 9.1 8.3 HGB 8.8* 9.7* HCT 27.7* 31.3*  
 160 Recent Labs 12/09/19 
0532 12/08/19 
0622 12/07/19 
0421 * 144 145  
K 3.4* 3.5 2.9*  
* 114* 112* CO2 25 25 26 BUN 42* 49* 58* CREA 2.14* 2.42* 2.58* * 119* 123* CA 8.7 8.7 8.5 No results for input(s): SGOT, GPT, ALT, AP, TBIL, TBILI, TP, ALB, GLOB, GGT, AML, LPSE in the last 72 hours. No lab exists for component: AMYP, HLPSE No results for input(s): INR, PTP, APTT, INREXT, INREXT in the last 72 hours. No results for input(s): FE, TIBC, PSAT, FERR in the last 72 hours. Lab Results Component Value Date/Time Folate 9.8 07/26/2019 06:03 AM  
  
No results for input(s): PH, PCO2, PO2 in the last 72 hours. No results for input(s): CPK, CKNDX, TROIQ in the last 72 hours. No lab exists for component: CPKMB Lab Results Component Value Date/Time Cholesterol, total 142 07/26/2019 06:03 AM  
 HDL Cholesterol 79 07/26/2019 06:03 AM  
 LDL, calculated 56.2 07/26/2019 06:03 AM  
 Triglyceride 34 07/26/2019 06:03 AM  
 CHOL/HDL Ratio 1.8 07/26/2019 06:03 AM  
 
Lab Results Component Value Date/Time Glucose (POC) 148 (H) 07/11/2013 09:36 PM  
 
Lab Results Component Value Date/Time  Color YELLOW/STRAW 07/26/2019 12:58 PM  
 Appearance CLEAR 07/26/2019 12:58 PM  
 Specific gravity 1.008 07/26/2019 12:58 PM  
 pH (UA) 7.5 07/26/2019 12:58 PM  
 Protein NEGATIVE  07/26/2019 12:58 PM  
 Glucose NEGATIVE  07/26/2019 12:58 PM  
 Ketone NEGATIVE  07/26/2019 12:58 PM  
 Bilirubin NEGATIVE  07/26/2019 12:58 PM  
 Urobilinogen 1.0 07/26/2019 12:58 PM  
 Nitrites NEGATIVE  07/26/2019 12:58 PM  
 Leukocyte Esterase NEGATIVE  07/26/2019 12:58 PM  
 
 
 
Medications Reviewed:  
 
Current Facility-Administered Medications Medication Dose Route Frequency  potassium chloride SR (KLOR-CON 10) tablet 40 mEq  40 mEq Oral Q4H  
 hydrALAZINE (APRESOLINE) tablet 50 mg  50 mg Oral TID  doxazosin (CARDURA) tablet 10 mg  10 mg Oral QHS  diphenhydrAMINE (BENADRYL) capsule 25 mg  25 mg Oral QHS PRN  
 apixaban (ELIQUIS) tablet 2.5 mg  2.5 mg Oral Q12H  verapamil ER (CALAN-SR) tablet 90 mg  90 mg Oral DAILY WITH BREAKFAST  dutasteride (AVODART) capsule 0.5 mg  0.5 mg Oral DAILY  levothyroxine (SYNTHROID) tablet 175 mcg  175 mcg Oral ACB  pantoprazole (PROTONIX) tablet 40 mg  40 mg Oral BID  thiamine HCL (B-1) tablet 100 mg  100 mg Oral DAILY  sodium chloride (NS) flush 5-40 mL  5-40 mL IntraVENous Q8H  
 sodium chloride (NS) flush 5-40 mL  5-40 mL IntraVENous PRN  
 acetaminophen (TYLENOL) tablet 650 mg  650 mg Oral Q4H PRN  
 hydrALAZINE (APRESOLINE) 20 mg/mL injection 10 mg  10 mg IntraVENous Q6H PRN  piperacillin-tazobactam (ZOSYN) 3.375 g in 0.9% sodium chloride (MBP/ADV) 100 mL  3.375 g IntraVENous Q12H  
 
______________________________________________________________________ EXPECTED LENGTH OF STAY: - - - 
ACTUAL LENGTH OF STAY:          5 Bridger Maldonado MD

## 2019-12-09 NOTE — PROGRESS NOTES
Problem: Falls - Risk of 
Goal: *Absence of Falls Description Document Candida Ruts Fall Risk and appropriate interventions in the flowsheet. Outcome: Progressing Towards Goal 
Note: Fall Risk Interventions: 
Mobility Interventions: Assess mobility with egress test, Bed/chair exit alarm, Communicate number of staff needed for ambulation/transfer, OT consult for ADLs, PT Consult for mobility concerns, PT Consult for assist device competence, Utilize walker, cane, or other assistive device, Utilize gait belt for transfers/ambulation, Strengthening exercises (ROM-active/passive), Patient to call before getting OOB Mentation Interventions: Adequate sleep, hydration, pain control, Bed/chair exit alarm, Door open when patient unattended, Evaluate medications/consider consulting pharmacy, Eyeglasses and hearing aids, Familiar objects from home, Family/sitter at bedside, Gait belt with transfers/ambulation, Increase mobility, More frequent rounding, Reorient patient, Update white board, Room close to nurse's station, Self-releasing belt, Toileting rounds Medication Interventions: Assess postural VS orthostatic hypotension, Bed/chair exit alarm, Patient to call before getting OOB, Teach patient to arise slowly, Utilize gait belt for transfers/ambulation, Evaluate medications/consider consulting pharmacy Elimination Interventions: Bed/chair exit alarm, Call light in reach, Elevated toilet seat, Patient to call for help with toileting needs, Toilet paper/wipes in reach, Toileting schedule/hourly rounds, Urinal in reach, Stay With Me (per policy) History of Falls Interventions: Bed/chair exit alarm, Consult care management for discharge planning, Evaluate medications/consider consulting pharmacy, Investigate reason for fall, Door open when patient unattended Problem: Pressure Injury - Risk of 
Goal: *Prevention of pressure injury Description Document Yasmany Scale and appropriate interventions in the flowsheet. Outcome: Progressing Towards Goal 
Note: Pressure Injury Interventions: 
Sensory Interventions: Assess changes in LOC, Assess need for specialty bed, Discuss PT/OT consult with provider, Float heels, Check visual cues for pain, Keep linens dry and wrinkle-free, Maintain/enhance activity level, Minimize linen layers, Monitor skin under medical devices, Pad between skin to skin, Sit a 90-degree angle/use footstool if needed, Suspension boots, Turn and reposition approx. every two hours (pillows and wedges if needed), Pressure redistribution bed/mattress (bed type) Activity Interventions: Assess need for specialty bed, Increase time out of bed, Pressure redistribution bed/mattress(bed type), PT/OT evaluation Mobility Interventions: Assess need for specialty bed, Float heels, HOB 30 degrees or less, Pressure redistribution bed/mattress (bed type), PT/OT evaluation, Turn and reposition approx. every two hours(pillow and wedges) Nutrition Interventions: Document food/fluid/supplement intake, Offer support with meals,snacks and hydration, Discuss nutritional consult with provider Friction and Shear Interventions: Apply protective barrier, creams and emollients, Foam dressings/transparent film/skin sealants, HOB 30 degrees or less, Lift sheet, Sit at 90-degree angle, Minimize layers, Transferring/repositioning devices, Transfer aides:transfer board/Bill lift/ceiling lift Bedside shift change report given to Flora Carson (oncoming nurse) by Vinay Hester RN (offgoing nurse). Report included the following information SBAR, Kardex, ED Summary, Procedure Summary, Intake/Output, MAR, Recent Results, Med Rec Status, Cardiac Rhythm Afib, Alarm Parameters , and Quality Measures.

## 2019-12-09 NOTE — PROGRESS NOTES
[facility] Transitional Care Team: Discharge [facility] of hospitals in the [place] Note Date of Assessment: 12/09/19 Time of Assessment:  12:31 PM 
 
Assessment & Plan  
SOPHIA Diagnoses: 
Large pleural effusion L>R likely due to acute on chronic CHF 
-s/p left thoracentesis 12/4, patient removed the catheter during night 
-s/p left thoracentesis 12/6, pleural fluid culture no growth 
-repeat chest x ray shows improvement 
-repeated CT chest 12/7 left lower lobe infiltrate. Residual small bilateral effusions. 
-received IV Zosyn in hospital; discharging on Augmentin BID x 5 days 
-F/U with thoracic next week with CXR Acute on chronic diastolic CHF, NYHA Class IV  
-Echo  12/6: EF 41-90%, LV diastolic dysfunction, aortic valve sclerosis with stenosis, mild MR, mod TR, pulm HTN,  
-discharging on lasix 40 mg po BID and hydralazine 50 mg TID 
-not on ACEi/ARB due to renal insufficiency  
-cardiology consulted 
-advised facility of recommendation on daily weight and parameters for calling Dr. Latisha Koch and to provide patient with low-salt cardiac diet Chronic AFib, rate controlled 
-bradycardia improved, continue verapamil ER 24 hour 90 mg and Eliquis 2.5 mg BID Acute metabolic encephalopathy POA 
-improved, patient is conscious and alert, oriented to place and person  
 
URIEL on CKD, Stage IV 
-creatinine improving Results for Lana Lennon (MRN [id number]) as of 12/9/2019 23:09 Ref. Range 12/5/2019 02:43 12/6/2019 05:06 12/7/2019 04:21 12/8/2019 06:22 12/9/2019 05:32 BUN Latest Ref Range: 6 - 20 MG/DL 70 (H) 63 (H) 58 (H) 49 (H) 42 (H) Creatinine Latest Ref Range: 0.70 - 1.30 MG/DL 2.84 (H) 2.75 (H) 2.58 (H) 2.42 (H) 2.14 (H) Recurrent fall 
-fall precaution  
-PT/OT evaluated Readmission Risks:   moderate to high 1. Frail elder with multiple chronic conditions 2. On Eliquis and hx of fall but also chronic AFib 3. Does not have DDNR on file with [facility]--is he a DDNR at facility? Nurse Navigator: no SPECIALISTS [facility] Transitions Nurse assigned yet 
SOPHIA appointments: Dr. Savannah Simmons 12/17 at 10:30AM; CXR at 9:30AM 
Dr. Ciara العلي 12/23 at 9AM 
 
Code status: Full Recommended Disposition: SNF/LTC--discharging to [facility]. and will then hopes to return to [place] there in a few days Number of Admissions this year:  2, including current Heart Failure Bundle:  yes Advance Care Plan: not on file and did not have a chance today to ask about this at discharge. Medication reconciliation was performed at discharge by PharmD. Discharge Needs: TBD after stay in [place] [address]. Awareness of medical conditions: CINDY today with limited time. NILES NP left daughter Seamus Zambranos with NILES calender/follow up appointments; did not have F/U with cardiology until after patient left but called Bl later with appt date and time. She confirmed receipt of my [employer] message with this information. Left my card with Seamus Ramírez for any F/U questions related to hospital stay. [facility] information not given to patient because he lives in [place] at [place]. Abril Ordaz is a 80 y.o. male inpatient at [facility] admitted with shortness of breath on 12/4/19. Chart reviewed by Alexandru Fink NP and [facility] Understanding of Goals) program introduced to patient/family. The Transitional Care Team bridges the gaps in care and education surrounding discharge from the acute care facility. The objective is to empower the patient and family in taking a proactive role in the task of preventing readmission within the first thirty days after discharge from the acute care setting. The team is also involved in the efforts to reduce readmission to the acute care setting after stabilization and discharge from the acute care environment either to the skilled nursing facilities or community. Past Medical History:  
Diagnosis Date  Arrhythmia   
 irrigular heart beat- A FIB  Cancer [facility]) 2013  COLON   
  [facility] ([facility] SKIN  
 Colon cancer ([facility] 7/1/2013  Hypertension Advance Care Planning 12/4/2019 Patient's Healthcare Decision Maker is: - Confirm Advance Directive None Does the patient have other document types -

## 2019-12-09 NOTE — DISCHARGE SUMMARY
Discharge Summary PATIENT ID: Paul Roman MRN: [id number] YOB: 1926 DATE OF ADMISSION: 12/4/2019 12:13 PM   
DATE OF DISCHARGE: 12/9/2019 PRIMARY CARE PROVIDER: Christy Wisdom DO  
 
ATTENDING PHYSICIAN: Samy Guy MD 
DISCHARGING PROVIDER: Brandy Chavez MD   
To contact this individual call [phone] and ask the  to page. If unavailable ask to be transferred the Adult Hospitalist Department. CONSULTATIONS: IP CONSULT TO HOSPITALIST 
IP CONSULT TO THORACIC SURGERY 
IP CONSULT TO CARDIOLOGY PROCEDURES/SURGERIES: * No surgery found * [id number] [facility] COURSE: The patient is a 80-year-old gentleman with past medical history of hypertension, history of colon cancer, atrial fibrillation, CHF, diabetes mellitus type 2, and a skin cancer, who presents to the hospital with shortness of breath . Large pleural effusion L>R likely due to acute on chronic CHF 
-s/p left thoracentesis 12/4, patient removed the catheter last night 
-s/p left thoracentesis 12/6, pleural fluid culture no growth 
-repeat chest x ray show improvement 
-repeated CT chest 12/7 left lower lobe infiltrate. Residual small bilateral effusions. -on iv zosyn, afebrile and no leukocytosis , will switch to po antibiotics with floraQ 
-prn oxygen support, prn pulse ox monitor  
-SpO2 96-99% on RA 
-thoracic surgeon on board Acute on chronic diastolic CHF NYHA Class IV  
-Last EF on 7/26/19 was 56-60% -Restart home lasix 40 mg po bid and hydralazine 50 mg tid  
-not on ACEi/ARB due to renal insufficiency  
-echo LV EF 56-60% no regional wall motion abnormality, left ventricular dysfunction, Mild MR  
-cardiologist on board Chronic A Fib, rate controlled 
-bradycardia improved, continue verapmile and eliquis Acute metabolic encephalopathy POA 
-improved, patient is conscious and alert, oriented to place and person  
-had on confusion, off sitter -continue supportive care URIEL on CKD stage IV 
-creatinine improving, hold lasix 
-avoid nephrotoxin  
-monitor renal function HTN 
-BP not at goal, continue verapamil, increased hydralazine, resume home terazosin and monitor BP Anemia of chronic disease  
-H/H stable, monitor cbc Hypothyroidism  
-stable, continue synthroid Recurrent fall 
-fall precaution  
-consult to PT/OT Hx of colon ca  
-s/p surgery 5 years ago 
-stable Hx of basal cell skin ca 
-stable 
  
Code status: Full Code DVT prophylaxis: Eliquis DISCHARGE DIAGNOSES / PLAN:   
 
Large pleural effusion L>R likely due to acute on chronic CHF 
-s/p left thoracentesis 12/4, patient removed the catheter last night 
-s/p left thoracentesis 12/6, pleural fluid culture no growth 
-continue lasix 40 mg bid 
-Augmentin 500-125 mg bid for 5 more days with liliana Q 
-outpatient follow up with thoracic surgeon Acute on chronic diastolic CHF NYHA Class III on discharge 
-continue lasix 40 mg po bid and hydralazine 50 mg tid  
-not on ACEi/ARB due to renal insufficiency  
-not on BB unknown reason  
-outpatient follow up with cardiologist 
Chronic A Fib, rate controlled 
-continue verapmile and eliquis Acute metabolic encephalopathy POA 
-improved 
-continue supportive care URIEL on CKD stage IV 
-creatinine improving, 
-avoid nephrotoxin HTN 
-continue verapamil, increased hydralazine, resume home terazosin and monitor BP Anemia of chronic disease  
-H/H stable, monitor cbc Hypothyroidism  
-stable, continue synthroid Recurrent fall 
-fall precaution  
-consult to PT/OT Hx of colon ca  
-s/p surgery 5 years ago 
-stable Hx of basal cell skin ca 
-stable PENDING TEST RESULTS:  
At the time of discharge the following test results are still pending: None FOLLOW UP APPOINTMENTS:   
Follow-up Information Follow up With Specialties Details Why Contact Info 1. Brandon Rubi, DO Internal Medicine In one week  [address] 
[phone] 2. Call and make appointment to see your Cardiologist in 1 week 3. Follow up with thoracic surgeon, Carson Main MD, in 2 weeks DIET: Cardiac Diet ACTIVITY: Activity as tolerated WOUND CARE: none EQUIPMENT needed: None DISCHARGE MEDICATIONS: 
Current Discharge Medication List  
  
START taking these medications Details  
hydrALAZINE (APRESOLINE) 50 mg tablet Take 1 Tab by mouth three (3) times daily. Qty: 90 Tab, Refills: 0  
  
amoxicillin-clavulanate (AUGMENTIN) 500-125 mg per tablet Take 1 Tab by mouth two (2) times a day for 5 days. Qty: 10 Tab, Refills: 0  
  
L.acidoph & parac-S.therm-Bifido (CHUY Q2/RISAQUAD-2) 16 billion cell cap cap Take 1 Cap by mouth daily for 10 days. Qty: 10 Cap, Refills: 0 CONTINUE these medications which have NOT CHANGED Details  
furosemide (LASIX) 40 mg tablet Take 40 mg by mouth two (2) times a day. loperamide (IMODIUM) 2 mg capsule Take 2 mg by mouth daily. potassium chloride SR (KLOR-CON 10) 10 mEq tablet Take 20 mEq by mouth daily. guaiFENesin-dextromethorphan (ROBAFEN DM COUGH)  mg/5 mL liqd Take 30 mL by mouth every four (4) hours as needed. verapamil HCl (VERAPAMIL PO) Take  by mouth. Verapamil ER 24 hour 90mg po once daily diphenhydrAMINE (BENADRYL ALLERGY) 25 mg tablet Take 25 mg by mouth nightly as needed for Sleep.  
  
apixaban (ELIQUIS) 2.5 mg tablet Take 2.5 mg by mouth two (2) times a day. Indications: Atrial Fibrillation  
  
ferrous sulfate 325 mg (65 mg iron) tablet Take 325 mg by mouth two (2) times a day. levothyroxine (SYNTHROID) 175 mcg tablet Take 175 mcg by mouth Daily (before breakfast). melatonin 10 mg tab Take 1 Tab by mouth nightly. omeprazole (PRILOSEC) 20 mg capsule Take 20 mg by mouth two (2) times a day. thiamine HCL (B-1) 100 mg tablet Take 100 mg by mouth daily. triamcinolone acetonide (KENALOG) 0.1 % topical cream Apply  to affected area two (2) times daily as needed for Skin Irritation or Itching. use thin layer  
  
cholecalciferol, vitamin D3, (VITAMIN D3) 2,000 unit tab Take 1 Tab by mouth daily. dutasteride (AVODART) 0.5 mg capsule Take 0.5 mg by mouth daily. terazosin (HYTRIN) 10 mg capsule Take 10 mg by mouth daily. Indications: high blood pressure NOTIFY YOUR PHYSICIAN FOR ANY OF THE FOLLOWING:  
Fever over 101 degrees for 24 hours. Chest pain, shortness of breath, fever, chills, nausea, vomiting, diarrhea, change in mentation, falling, weakness, bleeding. Severe pain or pain not relieved by medications. Or, any other signs or symptoms that you may have questions about. DISPOSITION: 
  Home With: 
 OT  PT  HH  RN  
  
x Long term SNF/Inpatient Rehab Independent/assisted living Hospice Other:  
 
 
PATIENT CONDITION AT DISCHARGE:  
 
Functional status Poor   
x Deconditioned Independent Cognition Omi Sin Forgetful   
x Dementia Catheters/lines (plus indication) William PICC   
 PEG   
x None Code status  
x  Full code DNR   
 
PHYSICAL EXAMINATION AT DISCHARGE: 
Patient Vitals for the past 24 hrs: 
 Temp Pulse Resp BP SpO2  
12/09/19 1300  (!) 59 20 144/59 100 % 12/09/19 1109 97.1 °F (36.2 °C) (!) 52 18 191/60 92 % 12/09/19 0710 97.6 °F (36.4 °C) 65 17 144/46 97 % 12/09/19 0318 97.7 °F (36.5 °C) 62 16 142/55 96 % 12/09/19 0127  64 23 170/63   
12/09/19 0055  65  176/82   
12/08/19 2357  70  163/86   
12/08/19 2254 97.8 °F (36.6 °C) 66 19 194/82 97 % 12/08/19 2234  69  (!) 208/97   
12/08/19 1933 97.4 °F (36.3 °C) (!) 54 16 188/84 97 % 12/08/19 1757  71  182/67   
12/08/19 1657  (!) 55  (!) 213/71   
12/08/19 1500  (!) 55 19 165/69 100 % 12/08/19 1419  (!) 54  183/79  General:          Alert, cooperative, no distress, appears stated age. HEENT:           Atraumatic, anicteric sclerae, pink conjunctivae No oral ulcers, mucosa moist, throat clear, dentition fair Neck:               Supple, symmetrical 
Lungs:             Clear to auscultation bilaterally. No Wheezing or Rhonchi. No rales. Chest wall:      No tenderness  No Accessory muscle use. Heart:              Regular  rhythm,  No  murmur   No edema Abdomen:        Soft, non-tender. Not distended. Bowel sounds normal 
Extremities:     No cyanosis. No clubbing,   
                        Skin turgor normal, Capillary refill normal 
Skin:                Not pale. Not Jaundiced  No rashes Psych:             Not anxious or agitated. Neurologic:      Alert, moves all extremities, orient to place, person and time, answers questions appropriately and responds to commands Recent Results (from the past 24 hour(s)) CBC W/O DIFF Collection Time: 12/09/19  5:22 AM  
Result Value Ref Range WBC 9.1 4.1 - 11.1 K/uL  
 RBC 2.85 (L) 4.10 - 5.70 M/uL HGB 8.8 (L) 12.1 - 17.0 g/dL HCT 27.7 (L) 36.6 - 50.3 % MCV 97.2 80.0 - 99.0 FL  
 MCH 30.9 26.0 - 34.0 PG  
 MCHC 31.8 30.0 - 36.5 g/dL  
 RDW 17.4 (H) 11.5 - 14.5 % PLATELET 656 721 - 911 K/uL MPV 9.2 8.9 - 12.9 FL  
 NRBC 0.0 0  WBC ABSOLUTE NRBC 0.00 0.00 - 0.01 K/uL METABOLIC PANEL, BASIC Collection Time: 12/09/19  5:32 AM  
Result Value Ref Range Sodium 146 (H) 136 - 145 mmol/L Potassium 3.4 (L) 3.5 - 5.1 mmol/L Chloride 115 (H) 97 - 108 mmol/L  
 CO2 25 21 - 32 mmol/L Anion gap 6 5 - 15 mmol/L Glucose 140 (H) 65 - 100 mg/dL BUN 42 (H) 6 - 20 MG/DL Creatinine 2.14 (H) 0.70 - 1.30 MG/DL  
 BUN/Creatinine ratio 20 12 - 20 GFR est AA 35 (L) >60 ml/min/1.73m2 GFR est non-AA 29 (L) >60 ml/min/1.73m2 Calcium 8.7 8.5 - 10.1 MG/DL  
 
CHRONIC MEDICAL DIAGNOSES: 
Problem List as of 12/9/2019 Date Reviewed: 7/25/2019 Codes Class Noted - Resolved SOB (shortness of breath) ICD-10-CM: R06.02 
ICD-9-CM: 786.05  12/4/2019 - Present Acute on chronic systolic CHF (congestive heart failure) (HCC) ICD-10-CM: S47.96 ICD-9-CM: 428.23, 428.0  7/25/2019 - Present Colon cancer [facility]) ICD-10-CM: C18.9 ICD-9-CM: 153.9  7/1/2013 - Present Greater than 45 minutes were spent with the patient on counseling and coordination of care Signed:  
Zakiya Simmons MD 
12/9/2019 11:59 AM

## 2019-12-09 NOTE — PROGRESS NOTES
SOPHIA 
 
1. Patient to transition to [address] [place] [address] when medically stable. CM received phone call from patient's daughter requesting for arrangements to be made for patient to be discharged to [address] [place] [address] today. CM advised daughter that there is no current discharge order in. Patient also has a sitter. CM followed up with  [facility] who confirms they have a bed available in healthcare for patient but if he is on IV medication, he will need a picc. [facility] would not be able to provide a sitter for patient but his family is welcome to hire someone for this. CM sent message to attending requesting follow up. CM to monitor. Pauly Holt MS 
 
11:45 am 
CM has confirmed hospital discharge with attending. Patient has been accepted to healthcare at [facility]. Nurse to call report to [phone]. CM to fax medicals to [phone]. Family will provide transportation. The Plan for Transition of Care is related to the following treatment goals: Patient will be returning to [address] [place] [address]. The Patient and/or patient representative was provided with a choice of provider and agrees  
with the discharge plan. [x] Yes [] No 
 
Freedom of choice list was provided with basic dialogue that supports the patient's individualized plan of care/goals, treatment preferences and shares the quality data associated with the providers. [] Yes [x] No-Patient is a resident.  
 
Pauly Holt, MS

## 2019-12-09 NOTE — PROGRESS NOTES
Chart checked, pt cleared by nursing. Attempted to work with pt. He was too drowsy, did not open his eyes when asked to do so. He just stated \"later\". His nurse and sitter were present. PT will defer, follow, and see as able and appropriate. Am concerned about return to assisted living based on chart review alone. Pt resides in a [facility] care community, perhaps a stay in health care is more appropriate.  Susanne Zambrano, PT

## 2019-12-09 NOTE — PROGRESS NOTES
0900: Attempted to get patient to take medications. Pt sleeping, will not wake up but will reply verbally. Pt states \"not right now, later\". Will reattempt for pt to take meds later. 1120: Pt woken up again to take medications. Pt agreed this time. /60, Dr Connor Guerrero aware and will continue to monitor. 1300: Pt SBP in the 140s now. I have reviewed discharge instructions with the patient and daughter, including follow up, prescriptions, and heart failure education. The patient and daughter verbalized understanding. I called report to [facility] and [address]. IV removed, wheeled down. Family transporting patient to facility.

## 2019-12-09 NOTE — WOUND CARE
Wound Care Note:  
 
New consult placed by nurse request for skin tears Chart shows: 
Admitted for SOB Past Medical History:  
Diagnosis Date  Arrhythmia   
 irrigular heart beat- A FIB  Cancer [facility]) 2013 COLON   
 Cancer ([facility] SKIN  
 Colon cancer ([facility] 7/1/2013  Hypertension WBC = 9.1 on 12/9/19 Admitted from [address] [place] [address] Assessment:  
Patient is alert and talking, continent with some assistance needed in repositioning. Bed: Total Care Diet: Cardiac regular Patient reports no pain. Bilateral heels and buttocks skin intact and without erythema. Sacrum with light hyperpigmentation and light erythema that is blanchable. 1. POA patient with 3 small skin tears on back and right upper arm and left lower arm with roll gauze in place. Patient has been discharged and he is getting dressed; he does not want them assessed. Recommendations: No wound care needed Skin Care & Pressure Prevention: 
Minimize layers of linen/pads under patient to optimize support surface. Turn/reposition approximately every 2 hours and offload heels. Manage incontinence / promote continence Nourishing Skin Cream to dry skin Discussed above plan with patient & Janis Zaldivar RN Transition of Care: Wound cared will sign off. RENITA Zurita, RN, [facility], [facility]. 
office [phone] 
pager [phone] or call  to page

## 2019-12-17 NOTE — PROGRESS NOTES
Asked to see Mr Karlos Aguilar re: Left pleural effusion Referred by Hospitalist Group Mr Karlos Aguilar is a pleasant 79 y/o gentleman with a past medical history significant for Afib, left pleural effusion and colon cancer. He was recently admitted with a left pleural effusion which was drained percutaneously. He was discharged before cytology results were posted. Since discharge he reports doing very well, returning to his daily routine. His daughter does state, however, that the past 48 hours he has been tired and mildly dyspneic. No definitive weight loss. No Known Allergies PMHx: Afib, colon cancer PSHx: tonsillectomy SocHx: remote tobacco use Family History Problem Relation Age of Onset  Colon Cancer Mother  Lung Cancer Father  Cancer Brother UNKNOWN Outpatient Medications Marked as Taking for the 12/17/19 encounter (Office Visit) with Minoo Cunha MD  
Medication Sig Dispense Refill  hydrALAZINE (APRESOLINE) 50 mg tablet Take 1 Tab by mouth three (3) times daily. 90 Tab 0  
 L.acidoph & parac-S.therm-Bifido (CHUY Q2/RISAQUAD-2) 16 billion cell cap cap Take 1 Cap by mouth daily for 10 days. 10 Cap 0  
 furosemide (LASIX) 40 mg tablet Take 40 mg by mouth two (2) times a day.  loperamide (IMODIUM) 2 mg capsule Take 2 mg by mouth daily.  potassium chloride SR (KLOR-CON 10) 10 mEq tablet Take 20 mEq by mouth daily.  guaiFENesin-dextromethorphan (ROBAFEN DM COUGH)  mg/5 mL liqd Take 30 mL by mouth every four (4) hours as needed.  verapamil HCl (VERAPAMIL PO) Take  by mouth. Verapamil ER 24 hour 90mg po once daily  diphenhydrAMINE (BENADRYL ALLERGY) 25 mg tablet Take 25 mg by mouth nightly as needed for Sleep.  apixaban (ELIQUIS) 2.5 mg tablet Take 2.5 mg by mouth two (2) times a day. Indications: Atrial Fibrillation  ferrous sulfate 325 mg (65 mg iron) tablet Take 325 mg by mouth two (2) times a day.  levothyroxine (SYNTHROID) 175 mcg tablet Take 175 mcg by mouth Daily (before breakfast).  melatonin 10 mg tab Take 1 Tab by mouth nightly.  omeprazole (PRILOSEC) 20 mg capsule Take 20 mg by mouth two (2) times a day.  thiamine HCL (B-1) 100 mg tablet Take 100 mg by mouth daily.  triamcinolone acetonide (KENALOG) 0.1 % topical cream Apply  to affected area two (2) times daily as needed for Skin Irritation or Itching. use thin layer  cholecalciferol, vitamin D3, (VITAMIN D3) 2,000 unit tab Take 1 Tab by mouth daily.  dutasteride (AVODART) 0.5 mg capsule Take 0.5 mg by mouth daily.  terazosin (HYTRIN) 10 mg capsule Take 10 mg by mouth daily. Indications: high blood pressure ROS: 
 
Constitutional- easily fatigued, weight fluctuates HEENT- denies dysphagia Neuro- denies syncope Optho- no recent changes in vision Resp- denies hemoptysis, + dyspnea CV- denies angina or palpitations GI- denies abd pain - no complaints ID- denies recent fevers Vasc- denies claudication Blood pressure 133/65, pulse (!) 50, resp. rate 18, height 6' (1.829 m), weight 145 lb (65.8 kg), SpO2 96 %. On exam he is seated upright Alert and oriented Appears his stated age Not tachypneic Normal speech, normal affect No cervical or supraclavicular lymphadenopathy Lungs diminished breath sounds left base No chest wall tenderness 
irreg rate rhythm, no murmurs Radial pulses palp b/l 
abd sntnd, no organomegaly LE non edematous Skin with multiple ecchymoses scattered throughout 
 
============================== Pleural fluid LDH > 3000 WBC 9.1 Hgb 8.8 Plts 155 
============================== 
 
I have personally reviewed his CXR today. There is a small left effusion and basilar opacity 
 
============================== 
 
PFTs- none 
 
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 
 
============================ 
 
 Diagnoses  1:  B Cell Lymphoma  2: Malignant pleural effusion   
 
============================= 
 
Mr Mikal Head has a newly diagnosed B Cell Lymphoma manifesting as a symptomatic malignant pleural effusion. I believe his effusion is reaccumulating. I spoke at length with he and his wife and daughter today about the diagnosis of lymphoma. I also spoke with Dr. Carrie Patel who is going to see him in consult. The next steps are a series of blood work and a PET scan. We will then work toward and excisional lymph node biopsy. He may also need a pleurodesis or Pleur-X catheter when his malignant effusion recurrs. I would prefer to do the lymph node biopsy and Pleur-X in the same setting to limit his exposure to Anesthetics. Orders placed for PET and blood work. Referral to Ricardo Ayala MD.  
 
 
Thank you for allowing us to care for Mr Iris Schwartz I spent 60 minutes with Iris Schwartz and their family, greater than 50% of which was spent in counseling and education reviewing their films, discussing surgical options and formulating a future care plan.

## 2019-12-17 NOTE — PROGRESS NOTES
Advised patient's daughter, Shiva Chu, of appointment with Dr. Adri Resendiz, oncologist, on Tuesday, January 7th @ 2:00 pm.  Arrive @ 2:00 pm.    Address and phone number given. Lb verbalized understanding of all.

## 2019-12-17 NOTE — PROGRESS NOTES
Hospital Follow up. 1. Have you been to the ER, urgent care clinic since your last visit? Hospitalized since your last visit? Yes, 12/4/19 @ [facility] for SOB. 2. Have you seen or consulted any other health care providers outside of the [address] [place] [address] since your last visit? Include any pap smears or colon screening.  No

## 2019-12-19 NOTE — TELEPHONE ENCOUNTER
Patient's daughter, Ingris Suero, called to let Dr. Sarah Contreras know that she heard from the Assisted Living nurse that the patient's breathing is \"labored. \"  His O2 sats asre 97% and he is walking around. I advised Lb that Dr. Sarah Contreras wants the patient to have his PET scan done first if possible, but if he is having trouble breathing, he should come to the ER here at [facility]. Lb verbalized understanding of all. I advised her that the PET scanner is at [facility] today and tomorrow. Lb verbalized understanding and stated that they would do whatever is needed to make it happen. I contacted Coordination of Care and asked them to please contact Lb to set it up.

## 2019-12-23 PROBLEM — R06.00 DYSPNEA: Status: ACTIVE | Noted: 2019-01-01

## 2019-12-23 PROBLEM — J90 PLEURAL EFFUSION: Status: ACTIVE | Noted: 2019-01-01

## 2019-12-23 NOTE — ED TRIAGE NOTES
Arrives via EMS from [facility] for difficulty breathing x 2 days. 99% on 4lpm NC. Also c/o last voiding +24 hrs ago. Hx a.fib & leukemia with fluid on lungs. Sees Dr. Talia Riley and had thoracentesis 2 weeks ago.

## 2019-12-23 NOTE — ANESTHESIA POSTPROCEDURE EVALUATION
Post-Anesthesia Evaluation and Assessment Patient: Radha Elliott MRN: [id number]  SSN: [id number] YOB: 1926  Age: 80 y.o. Sex: male I have evaluated the patient and they are stable and ready for discharge from the PACU. Cardiovascular Function/Vital Signs Visit Vitals /63 Pulse 70 Temp 36.5 °C (97.7 °F) Resp 12 Ht 6' 2\" (1.88 m) Wt 72.6 kg (160 lb) SpO2 100% BMI 20.54 kg/m² Patient is status post MAC anesthesia for Procedure(s): CHEST TUBES INSERTION. Nausea/Vomiting: None Postoperative hydration reviewed and adequate. Pain: 
Pain Scale 1: Numeric (0 - 10) (12/23/19 1339) Pain Intensity 1: 0 (12/23/19 1339) Managed Neurological Status: At baseline Mental Status, Level of Consciousness: Alert and  oriented to person, place, and time Pulmonary Status:  
O2 Device: Nasal cannula (12/23/19 1432) Adequate oxygenation and airway patent Complications related to anesthesia: None Post-anesthesia assessment completed. No concerns Signed By: Rossy Levy MD   
 December 23, 2019 Procedure(s): CHEST TUBES INSERTION. MAC 
 
<BSHSIANPOST> No vitals data found for the desired time range.

## 2019-12-23 NOTE — ED PROVIDER NOTES
80 y.o. male with past medical history significant for A-fib, colon cancer, skin cancer, CHF, and HTN who presents via EMS with his daughter, with chief complaint of shortness of breath. Pt c/o increased shortness of breath for the past two days with associated fatigue for the past 5 days, and urinary retention for the past 24 hours. Pt was recently admitted for a thoracentesis on 12/04 due to fluid build up from CHF. His thoracic surgeon Dr. Keenan Lai plans to place a tube for future drainage, but advised pt to the ED today for his current sxs, which as similar to previous episodes of fluid build up in his lungs. He endorses accompanying cough. He denies appetite changes, fever, chills, diaphoresis, and pain. 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 Pt is anticoagulated on Eliquis. There are no other acute medical concerns at this time. Social hx: former tobacco smoker (quit 1983), endorses EtOH use, denies illicit drug use. PCP: Rambo Watts DO Note written by Laurita Lundborg, Scribe, as dictated by Giuliana Narvaez MD 9:54 AM 
 
 
The history is provided by the patient and a relative. No  was used. Past Medical History:  
Diagnosis Date  Arrhythmia   
 irrigular heart beat- A FIB  Cancer [facility]) 2013 COLON   
 Cancer ([facility] [address] SKIN  
 Colon cancer ([facility] [address] 7/1/2013  Heart failure ([facility] [address]  Hypertension Past Surgical History:  
Procedure Laterality Date  HX OTHER SURGICAL    
 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  
 HX TONSIL AND ADENOIDECTOMY AS CHILD Family History:  
Problem Relation Age of Onset  Colon Cancer Mother  Lung Cancer Father  Cancer Brother UNKNOWN Social History Socioeconomic History  Marital status:  Spouse name: Not on file  Number of children: Not on file  Years of education: Not on file  Highest education level: Not on file Occupational History  Not on file Social Needs  Financial resource strain: Not on file  Food insecurity:  
  Worry: Not on file Inability: Not on file  Transportation needs:  
  Medical: Not on file Non-medical: Not on file Tobacco Use  Smoking status: Former Smoker Packs/day: 0.50 Years: 32.00 Pack years: 16.00 Types: Cigarettes Last attempt to quit: 1983 Years since quittin.5  Smokeless tobacco: Never Used Substance and Sexual Activity  Alcohol use: Yes Comment: edie/gin 7 per week  Drug use: No  
 Sexual activity: Not on file Lifestyle  Physical activity:  
  Days per week: Not on file Minutes per session: Not on file  Stress: Not on file Relationships  Social connections:  
  Talks on phone: Not on file Gets together: Not on file Attends Taoist service: Not on file Active member of club or organization: Not on file Attends meetings of clubs or organizations: Not on file Relationship status: Not on file  Intimate partner violence:  
  Fear of current or ex partner: Not on file Emotionally abused: Not on file Physically abused: Not on file Forced sexual activity: Not on file Other Topics Concern  Not on file Social History Narrative  Not on file ALLERGIES: Patient has no known allergies. Review of Systems Constitutional: Positive for fatigue. Negative for activity change, appetite change, chills and fever. HENT: Negative for ear pain, facial swelling, sore throat and trouble swallowing. Eyes: Negative for pain, discharge and visual disturbance. Respiratory: Positive for cough and shortness of breath. Negative for chest tightness and wheezing. Cardiovascular: Negative for chest pain and palpitations. Gastrointestinal: Negative for abdominal pain, blood in stool, nausea and vomiting. Genitourinary: Negative for difficulty urinating, flank pain and hematuria. Musculoskeletal: Negative for arthralgias, joint swelling, myalgias and neck pain. Skin: Negative for color change and rash. Neurological: Negative for dizziness, weakness, numbness and headaches. Hematological: Negative for adenopathy. Does not bruise/bleed easily. Psychiatric/Behavioral: Negative for behavioral problems, confusion and sleep disturbance. All other systems reviewed and are negative. Vitals:  
 12/23/19 8467 12/23/19 0940 BP: 138/74 Pulse: (!) 52 Resp: 25 SpO2: 93% 96% Physical Exam 
Vitals signs and nursing note reviewed. Constitutional:   
   General: He is not in acute distress. Appearance: He is well-developed. Interventions: Nasal cannula in place. HENT:  
   Head: Normocephalic and atraumatic. Nose: Nose normal.  
Eyes:  
   General: No scleral icterus. Conjunctiva/sclera: Conjunctivae normal.  
   Pupils: Pupils are equal, round, and reactive to light. Neck: Musculoskeletal: Normal range of motion and neck supple. Thyroid: No thyromegaly. Vascular: No carotid bruit or JVD. Trachea: No tracheal deviation. Cardiovascular:  
   Rate and Rhythm: Normal rate and regular rhythm. Heart sounds: Normal heart sounds. No murmur. No friction rub. No gallop. Pulmonary:  
   Effort: Pulmonary effort is normal.  
   Breath sounds: Decreased breath sounds (markedly, L side) present. Comments: E to A speech changes. Chest:  
   Chest wall: No tenderness. Abdominal:  
   General: Bowel sounds are normal. There is no distension. Palpations: Abdomen is soft. There is no mass. Tenderness: There is no tenderness. There is no guarding or rebound. Musculoskeletal: Normal range of motion. General: No tenderness. Lymphadenopathy:  
   Cervical: No cervical adenopathy. Skin: 
   General: Skin is warm and dry. Findings: No erythema or rash. Neurological:  
   Mental Status: He is alert and oriented to person, place, and time. Cranial Nerves: No cranial nerve deficit. Coordination: Coordination normal.  
   Deep Tendon Reflexes: Reflexes are normal and symmetric. Psychiatric:     
   Behavior: Behavior normal.     
   Thought Content: Thought content normal.     
   Judgment: Judgment normal.  
 
 Note written by Evangelista Miller, as dictated by Fito Alejandro MD 10:03 AM 
 
 
MDM Number of Diagnoses or Management Options Pleural effusion: established and worsening Amount and/or Complexity of Data Reviewed Clinical lab tests: ordered and reviewed Tests in the radiology section of CPT®: ordered and reviewed Decide to obtain previous medical records or to obtain history from someone other than the patient: yes Review and summarize past medical records: yes Discuss the patient with other providers: yes Independent visualization of images, tracings, or specimens: yes Risk of Complications, Morbidity, and/or Mortality Presenting problems: high Diagnostic procedures: high Management options: high Patient Progress Patient progress: stable Procedures ED EKG interpretation: 09:39 Rhythm: atrial fib; and irregular. Rate (approx.): 59; Axis: left axis deviation; ST/T wave: non-specific changes; Poor R wave progression, no other ectopy, no ischemic changes. Note written by Evangelista Miller, as dictated by Fito Alejandro MD 9:43 AM 
 
12:05PM  
 and his ACP are in ED to see the patient. Dr. Misty Torres would like the patient admitted to the medical service for further evaluation. He is planning on draining the chest tomorrow. He has seen the patient in the ED. Camronist Thiago Text for Admission 12:22 PM 
 
ED Room Number: NX87/15 Patient Name and age:  Maddie Smiley 80 y.o.  male Working Diagnosis: 1. Pleural effusion Readmission: no 
Isolation Requirements:  no 
Recommended Level of Care:  Telemetry Code Status:  Full Other:   
Department:[facility] Adult ED - [phone]

## 2019-12-23 NOTE — PERIOP NOTES
Pt arrived to the bronchoscopy lab via wheelchair. Transferred from chair to stretcher with minimal assist. Noted to have some generalized weakness. LAC #20 IV intact, flushes without difficulty with positive blood return. Pre-op questions answered by daughter, Isaias Glynn, who remained in the department for the duration of the procedure. Pt received initially bradycardic and tachypneic. Left pleur-x inserted by Dr. Misty Torres. Pt tolerated well. Post procedure vital signs remain stable. Bed placement contacted for bed assignment. Pt to be admitted to room 433B. Attempted to call the unit to provide report however no one was able to receive report on the patient. Daughter. Bogdan Rees, is currently at bedside. Advised that during the previous admission the pt required a sitter secondary to pulling IVs out. Will advise primary care nurse. Pt ok to transfer to the floor. Pending transfer now.

## 2019-12-23 NOTE — ROUTINE PROCESS
SBAR report given to Virginie Ibrahim, primary care nurse on 4W receiving pt. Pt transported via respiratory stretcher with two daughters, Med Armstrong and Miguel Roman, accompanying.

## 2019-12-23 NOTE — PROGRESS NOTES
Admission Medication Reconciliation: 
 
Information obtained from:  Medication list and staff at [facility] Comments/Recommendations: Reviewed PTA medications and patient's allergies. Medication changes (since last review): Added 
- none Adjusted 
- loperamide daily to PRN 
- diphenhydramine 25mg to 12.5mg Removed 
- hydralazine ¹[employer] pharmacy benefit data reflects medications filled and processed through the patient's insurance, however  
this data does NOT capture whether the medication was picked up or is currently being taken by the patient. Allergies:  Patient has no known allergies. Significant PMH/Disease States:  
Past Medical History:  
Diagnosis Date Arrhythmia   
 irrigular heart beat- A FIB Cancer [facility]) 2013 COLON Cancer ([facility] [address] SKIN Colon cancer ([facility] [address] 7/1/2013 Heart failure ([facility] [address] Hypertension Chief Complaint for this Admission: Chief Complaint Patient presents with Shortness of Breath Prior to Admission Medications:  
Prior to Admission Medications Prescriptions Last Dose Informant Patient Reported? Taking? apixaban (ELIQUIS) 2.5 mg tablet 12/23/2019  Yes Yes Sig: Take 2.5 mg by mouth two (2) times a day. Indications: Atrial Fibrillation  
cholecalciferol, vitamin D3, (VITAMIN D3) 2,000 unit tab 12/23/2019  Yes Yes Sig: Take 1 Tab by mouth daily. diphenhydrAMINE (BENADRYL ALLERGY) 25 mg tablet   Yes Yes Sig: Take 12.5 mg by mouth nightly as needed for Sleep. dutasteride (AVODART) 0.5 mg capsule 12/23/2019  Yes Yes Sig: Take 0.5 mg by mouth daily. ferrous sulfate 325 mg (65 mg iron) tablet 12/23/2019  Yes Yes Sig: Take 325 mg by mouth two (2) times a day. furosemide (LASIX) 40 mg tablet 12/23/2019  Yes Yes Sig: Take 40 mg by mouth two (2) times a day. guaiFENesin-dextromethorphan (ROBAFEN DM COUGH)  mg/5 mL liqd   Yes Yes Sig: Take 30 mL by mouth every four (4) hours as needed. levothyroxine (SYNTHROID) 175 mcg tablet 12/23/2019  Yes Yes Sig: Take 175 mcg by mouth Daily (before breakfast). loperamide (IMODIUM) 2 mg capsule 12/23/2019  Yes Yes Sig: Take 2 mg by mouth daily as needed. melatonin 10 mg tab 12/22/2019 at Unknown time  Yes Yes Sig: Take 1 Tab by mouth nightly. omeprazole (PRILOSEC) 20 mg capsule 12/23/2019  Yes Yes Sig: Take 20 mg by mouth two (2) times a day. potassium chloride SR (KLOR-CON 10) 10 mEq tablet 12/23/2019  Yes Yes Sig: Take 20 mEq by mouth daily. terazosin (HYTRIN) 10 mg capsule 12/23/2019  Yes Yes Sig: Take 10 mg by mouth daily. Indications: high blood pressure  
thiamine HCL (B-1) 100 mg tablet 12/23/2019  Yes Yes Sig: Take 100 mg by mouth daily. triamcinolone acetonide (KENALOG) 0.1 % topical cream   Yes Yes Sig: Apply  to affected area two (2) times daily as needed for Skin Irritation or Itching. use thin layer  
verapamil ER (CALAN-SR) 180 mg CR tablet 12/23/2019  Yes Yes Sig: Take 90 mg by mouth daily. Facility-Administered Medications: None

## 2019-12-23 NOTE — BRIEF OP NOTE
BRIEF OPERATIVE NOTE Date of Procedure: 12/23/2019 Preoperative Diagnosis: malignant pleural effusion, B Cell lymphoma Postoperative Diagnosis: same Procedure(s): 
Placement of Left pleur-X catheter Surgeon(s) and Role: Jf Alexander MD - Primary Surgical Assistant: none Surgical Staff: 
Endoscopy RN-1: Dinah Heredia RN Respiratory Therapist: Dre Tapia RT No case tracking events are documented in the log. Anesthesia: MAC Estimated Blood Loss: 10ml Specimens: * No specimens in log * Findings: 4L serous effusion drained Complications: bradycardia Implants: * No implants in log * Condition: guarded Disposition: to pacu

## 2019-12-23 NOTE — H&P
HISTORY AND PHYSICAL Ambreen Fernandez MD 
 
 
 
PCP: Marlena Coronado, DO Please note that this dictation was completed with Materialise, the computer voice recognition software. Quite often unanticipated grammatical, syntax, homophones, and other interpretive errors are inadvertently transcribed by the computer software. Please disregard these errors. Please excuse any errors that have escaped final proofreading. C/C: Shortness of breath HPI: 
This is a 80-year-old gentleman with significant medical history of heart failure, hypertension, history of colon cancer and recurrent pleural effusion who was sent from [address] [place] [address] for worsening of shortness of breath. Patient has had recurrent pleural effusion and had thoracentesis in the past.  Most recently, he was admitted between 12/4-12/9 and had left sided thoracentesis. Pleural fluid analysis was consistent with exudative and cytology Was consistent with B-cell lymphoma. He was scheduled to have PET scan which did not happen yet. He has an upcoming appointment to see a medical oncologist. 
Patient says he has cough which is unchanged, denied fever or chills. No nausea or vomiting. Denied chest pain. Initial evaluation in the emergency room: Blood pressure 138/74, heart rate 52, respiratory 25, SPO2 93% room air and now on 2 L of oxygen via nasal cannula. CBC showed normal white cell count, hemoglobin 9.5 and platelet 028 BMP: Sodium 146, BUN/creatinine 55/2. 35. TSH 4.08. Portable chest x-ray showed increased left pleural effusion with associated consolidation. Thoracic surgery evaluated patient and is planning for thoracentesis this afternoon. PMH/PSH: 
Past Medical History:  
Diagnosis Date  Arrhythmia   
 irrigular heart beat- A FIB  Cancer [facility]) 2013 COLON   
 Cancer ([facility] [address] SKIN  
 Colon cancer ([facility] [address] 7/1/2013  Heart failure ([facility] [address]  Hypertension Past Surgical History: Procedure Laterality Date  HX OTHER SURGICAL    
 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  
 HX TONSIL AND ADENOIDECTOMY AS CHILD Home meds:  
Prior to Admission medications Medication Sig Start Date End Date Taking? Authorizing Provider  
verapamil ER (CALAN-SR) 180 mg CR tablet Take 90 mg by mouth daily. Yes Provider, Historical  
hydrALAZINE (APRESOLINE) 50 mg tablet Take 1 Tab by mouth three (3) times daily. 12/9/19   [place], MD  
furosemide (LASIX) 40 mg tablet Take 40 mg by mouth two (2) times a day. Provider, Historical  
loperamide (IMODIUM) 2 mg capsule Take 2 mg by mouth daily as needed. Provider, Historical  
potassium chloride SR (KLOR-CON 10) 10 mEq tablet Take 20 mEq by mouth daily. Provider, Historical  
guaiFENesin-dextromethorphan (ROBAFEN DM COUGH)  mg/5 mL liqd Take 30 mL by mouth every four (4) hours as needed. Provider, Historical  
diphenhydrAMINE (BENADRYL ALLERGY) 25 mg tablet Take 12.5 mg by mouth nightly as needed for Sleep. Provider, Historical  
apixaban (ELIQUIS) 2.5 mg tablet Take 2.5 mg by mouth two (2) times a day. Indications: Atrial Fibrillation    Provider, Historical  
ferrous sulfate 325 mg (65 mg iron) tablet Take 325 mg by mouth two (2) times a day. Provider, Historical  
levothyroxine (SYNTHROID) 175 mcg tablet Take 175 mcg by mouth Daily (before breakfast). Provider, Historical  
melatonin 10 mg tab Take 1 Tab by mouth nightly. Provider, Historical  
omeprazole (PRILOSEC) 20 mg capsule Take 20 mg by mouth two (2) times a day. Provider, Historical  
thiamine HCL (B-1) 100 mg tablet Take 100 mg by mouth daily. Provider, Historical  
triamcinolone acetonide (KENALOG) 0.1 % topical cream Apply  to affected area two (2) times daily as needed for Skin Irritation or Itching.  use thin layer    Provider, Historical  
cholecalciferol, vitamin D3, (VITAMIN D3) 2,000 unit tab Take 1 Tab by mouth daily. Provider, Historical  
dutasteride (AVODART) 0.5 mg capsule Take 0.5 mg by mouth daily. Provider, Historical  
terazosin (HYTRIN) 10 mg capsule Take 10 mg by mouth daily. Indications: high blood pressure    Provider, Historical  
 
 
Allergies: 
No Known Allergies FH: 
Family History Problem Relation Age of Onset  Colon Cancer Mother  Lung Cancer Father  Cancer Brother UNKNOWN  
 
 
SH: 
Social History Tobacco Use  Smoking status: Former Smoker Packs/day: 0.50 Years: 32.00 Pack years: 16.00 Types: Cigarettes Last attempt to quit: 1983 Years since quittin.5  Smokeless tobacco: Never Used Substance Use Topics  Alcohol use: Yes Comment: edie/gin 7 per week ROS: A comprehensive review of systems was negative except for that written in the HPI. PHYSICAL EXAM: 
Visit Vitals /78 Pulse (!) 42 Resp 17 SpO2 98% General:            Chronically sick looking. HEENT:           Atraumatic, anicteric sclerae, pink conjunctivae No oral ulcers, mucosa moist, throat clear, dentition fair Neck:               Supple, symmetrical,  thyroid: non tender Lungs:              Mild tachypnea. Decreased air entry on bilateral lung lower lung fields, left more than the right. There is no midline shift. Chest wall:      No tenderness  No Accessory muscle use. Heart:                Irregularly irregular, bradycardic. No  murmur   No edema Abdomen:        Soft, non-tender. Not distended. Bowel sounds normal 
Extremities:     No cyanosis. No clubbing,   
                        Skin turgor normal, Capillary refill normal, Radial dial pulse 2+ Skin:                Not pale. Not Jaundiced  No rashes Psych:             Not anxious or agitated. Neurologic:      Mental status: Alert and oriented times place person time and situation Cranial nerves II through XII are intact Motor examination symmetrical 
  Gait was not tested. Labs/Imaging: 
Recent Results (from the past 24 hour(s)) CBC WITH AUTOMATED DIFF Collection Time: 12/23/19  9:45 AM  
Result Value Ref Range WBC 5.2 4.1 - 11.1 K/uL  
 RBC 3.07 (L) 4.10 - 5.70 M/uL HGB 9.5 (L) 12.1 - 17.0 g/dL HCT 31.3 (L) 36.6 - 50.3 % .0 (H) 80.0 - 99.0 FL  
 MCH 30.9 26.0 - 34.0 PG  
 MCHC 30.4 30.0 - 36.5 g/dL  
 RDW 18.6 (H) 11.5 - 14.5 % PLATELET 520 (L) 877 - 400 K/uL MPV 9.1 8.9 - 12.9 FL  
 NRBC 0.0 0  WBC ABSOLUTE NRBC 0.00 0.00 - 0.01 K/uL NEUTROPHILS 84 (H) 32 - 75 % LYMPHOCYTES 8 (L) 12 - 49 % MONOCYTES 5 5 - 13 % EOSINOPHILS 3 0 - 7 % BASOPHILS 0 0 - 1 % IMMATURE GRANULOCYTES 0 0.0 - 0.5 % ABS. NEUTROPHILS 4.3 1.8 - 8.0 K/UL  
 ABS. LYMPHOCYTES 0.4 (L) 0.8 - 3.5 K/UL  
 ABS. MONOCYTES 0.3 0.0 - 1.0 K/UL  
 ABS. EOSINOPHILS 0.2 0.0 - 0.4 K/UL  
 ABS. BASOPHILS 0.0 0.0 - 0.1 K/UL  
 ABS. IMM. GRANS. 0.0 0.00 - 0.04 K/UL  
 DF SMEAR SCANNED    
 PLATELET COMMENTS Large Platelets RBC COMMENTS ANISOCYTOSIS 1+ 
    
 RBC COMMENTS MACROCYTOSIS 1+ 
    
 RBC COMMENTS HYPOCHROMIA 1+ METABOLIC PANEL, COMPREHENSIVE Collection Time: 12/23/19  9:45 AM  
Result Value Ref Range Sodium 146 (H) 136 - 145 mmol/L Potassium 4.3 3.5 - 5.1 mmol/L Chloride 112 (H) 97 - 108 mmol/L  
 CO2 28 21 - 32 mmol/L Anion gap 6 5 - 15 mmol/L Glucose 120 (H) 65 - 100 mg/dL BUN 55 (H) 6 - 20 MG/DL Creatinine 2.35 (H) 0.70 - 1.30 MG/DL  
 BUN/Creatinine ratio 23 (H) 12 - 20 GFR est AA 32 (L) >60 ml/min/1.73m2 GFR est non-AA 26 (L) >60 ml/min/1.73m2 Calcium 8.9 8.5 - 10.1 MG/DL Bilirubin, total 0.5 0.2 - 1.0 MG/DL  
 ALT (SGPT) 29 12 - 78 U/L  
 AST (SGOT) 31 15 - 37 U/L Alk. phosphatase 75 45 - 117 U/L Protein, total 6.8 6.4 - 8.2 g/dL Albumin 3.1 (L) 3.5 - 5.0 g/dL Globulin 3.7 2.0 - 4.0 g/dL  A-G Ratio 0.8 (L) 1.1 - 2.2    
TROPONIN I  
 Collection Time: 12/23/19  9:45 AM  
Result Value Ref Range Troponin-I, Qt. <0.05 <0.05 ng/mL SAMPLES BEING HELD Collection Time: 12/23/19  9:45 AM  
Result Value Ref Range SAMPLES BEING HELD 1RED 1BLU   
 COMMENT Add-on orders for these samples will be processed based on acceptable specimen integrity and analyte stability, which may vary by analyte. URINALYSIS W/MICROSCOPIC Collection Time: 12/23/19  9:45 AM  
Result Value Ref Range Color YELLOW/STRAW Appearance CLEAR CLEAR Specific gravity 1.016 1.003 - 1.030    
 pH (UA) 5.0 5.0 - 8.0 Protein 100 (A) NEG mg/dL Glucose NEGATIVE  NEG mg/dL Ketone NEGATIVE  NEG mg/dL Bilirubin NEGATIVE  NEG Blood NEGATIVE  NEG Urobilinogen 0.2 0.2 - 1.0 EU/dL Nitrites NEGATIVE  NEG Leukocyte Esterase TRACE (A) NEG    
 WBC 5-10 0 - 4 /hpf  
 RBC 0-5 0 - 5 /hpf Epithelial cells FEW FEW /lpf Bacteria NEGATIVE  NEG /hpf Hyaline cast 2-5 0 - 5 /lpf URINE CULTURE HOLD SAMPLE Collection Time: 12/23/19  9:45 AM  
Result Value Ref Range Urine culture hold URINE ON HOLD IN MICROBIOLOGY DEPT FOR 3 DAYS. IF UNPRESERVED URINE IS SUBMITTED, IT CANNOT BE USED FOR ADDITIONAL TESTING AFTER 24 HRS, RECOLLECTION WILL BE REQUIRED. PROTHROMBIN TIME + INR Collection Time: 12/23/19  9:45 AM  
Result Value Ref Range INR 1.1 0.9 - 1.1 Prothrombin time 11.1 9.0 - 11.1 sec PTT Collection Time: 12/23/19  9:45 AM  
Result Value Ref Range aPTT 33.3 (H) 22.1 - 32.0 sec  
 aPTT, therapeutic range     58.0 - 77.0 SECS  
TSH 3RD GENERATION Collection Time: 12/23/19  9:45 AM  
Result Value Ref Range TSH 4.08 (H) 0.36 - 3.74 uIU/mL Recent Labs  
  12/23/19 
0945 WBC 5.2 HGB 9.5* HCT 31.3*  
* Recent Labs  
  12/23/19 
0945 *  
K 4.3 * CO2 28 BUN 55* CREA 2.35* * CA 8.9 Recent Labs  
  12/23/19 
0945 SGOT 31 ALT 29 AP 75 TBILI 0.5 TP 6.8 ALB 3.1*  
GLOB 3.7 Recent Labs  
  12/23/19 
0945 TROIQ <0.05 Recent Labs  
  12/23/19 
0945 INR 1.1 PTP 11.1 APTT 33.3* No results for input(s): PH, PCO2, PO2 in the last 72 hours. XR CHEST PORT Narrative: Indication: Shortness of breath Comparison: 12/17/2019 Portable exam of the chest obtained at 10:15 demonstrates a significant increase 
in the left pleural effusion, now large in size. Underlying consolidation is 
noted. Impression: Impression: Increased left effusion with underlying consolidation. Assessment & Plan: #1. Symptomatic malignant pleural effusion 
-He will be undergoing thoracentesis and pigtail catheter placement which has been a become for long-term. -Supportive care including supplemental oxygen. Patient is not globally fluid overloaded, will continue with home oral 
 Lasix #2. B-cell lymphoma. Pleural fluid cytology showed B-cell lymphoma. Patient was scheduled to see medical oncologist next week and he was due to have a PET scan which did not happen yet. Consult oncologist to see patient while inpatient. #3.  Chronic atrial fibrillation currently bradycardia, in the 40s and 30s. Ordered EKG. Closely monitor. Hold calcium channel, beta-blockers. Be admitted to cardiac monitored unit. Eliquis will be resumed after thoracentesis, when okay with thoracic surgery #4. Chronic diastolic congestive heart failure, NYHA class unclassified due to patient's dyspnea from malignant pleural effusion. Home oral Lasix will be continued #5. CKD stage IV, GFR generally stable. Continue to closely monitor. #6. Hypertension, continue home medication except verapamil which is hold due to bradycardia #7. Chronic anemia, anemia of chronic disease. Hemoglobin is stable. No evidence of bleeding. #8.  History of colon cancer status post surgery and also history of basal cell carcinoma of the skin. #9.  History of oral pharyngeal dysphagia. Patient has history of aspiration. Now he has with cough in the setting of respiratory distress due to pleural effusion. Will ask speech therapy to evaluate. Aspiration precaution. Suction as needed. 10.  Hypothyroidism, continue levothyroxine Patient's Baseline: With devices DVT ppx: On Eliquis which is now healed for thoracentesis, fluid resume tomorrow if okay with thoracic surgeon Code status: Discussed advance care planning with patient and his daughter who accompanied him. When we talk about resuscitation,patient stated \"pull the sheet, cover me up and say goodbye,\"that he wants to be DNR. Disposition: back to admission address. Signed By: Lara Montague MD   
 December 23, 2019

## 2019-12-23 NOTE — CONSULTS
Consult Subjective:  
 
Iris Schwartz is a 80 y.o.  male who is being seen for left pleural effusion. Onset of symptoms was last night according to his daughter. He started using the oxygen then but this morning about 0830 he increasing SOB and she brought him to the ED for evaluation. He was recently seen in our office for hospital follow up for a left sided effusion that was tapped an found to have B cell Lymphoma. We had planned to place a pleurX catheter after his PET scan. However, his blood glucose was too high on 19 to have the scan. On exam he is using [place] oxygen at 2 liters with O2 sat of 95%. RR 16 Heart rate is dropping to 39 at times sinus bradycardia. His daughter says he has not eaten since she 0830 when she arrived to pick him up for another appointment. PMH: Heart failure, left sided malignant effusion, HTN. Past Medical History:  
Diagnosis Date  Arrhythmia   
 irrigular heart beat- A FIB  Cancer [facility])  COLON   
 Cancer ([facility] [address] SKIN  
 Colon cancer ([facility] [address] 2013  Heart failure ([facility] [address]  Hypertension Past Surgical History:  
Procedure Laterality Date  HX OTHER SURGICAL    
 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  
 HX TONSIL AND ADENOIDECTOMY AS CHILD Family History Problem Relation Age of Onset  Colon Cancer Mother  Lung Cancer Father  Cancer Brother UNKNOWN Social History Tobacco Use  Smoking status: Former Smoker Packs/day: 0.50 Years: 32.00 Pack years: 16.00 Types: Cigarettes Last attempt to quit: 1983 Years since quittin.5  Smokeless tobacco: Never Used Substance Use Topics  Alcohol use: Yes Comment: edie/gin 7 per week Current Facility-Administered Medications Medication Dose Route Frequency  bupivacaine (PF) (MARCAINE) 0.25 % (2.5 mg/mL) injection  sodium chloride (NS) flush 5-40 mL  5-40 mL IntraVENous Q8H  
  sodium chloride (NS) flush 5-40 mL  5-40 mL IntraVENous PRN  
 acetaminophen (TYLENOL) tablet 650 mg  650 mg Oral Q4H PRN  
 HYDROcodone-acetaminophen (NORCO) 5-325 mg per tablet 1 Tab  1 Tab Oral Q4H PRN  
 HYDROmorphone (PF) (DILAUDID) injection 0.5 mg  0.5 mg IntraVENous Q4H PRN  
 . PHARMACY TO SUBSTITUTE PER PROTOCOL (Reordered from: diphenhydrAMINE (BENADRYL ALLERGY) 25 mg tablet)    Per Protocol  [START ON 12/24/2019] dutasteride (AVODART) capsule 0.5 mg  0.5 mg Oral DAILY  ferrous sulfate tablet 325 mg  325 mg Oral BID  furosemide (LASIX) tablet 40 mg  40 mg Oral BID  hydrALAZINE (APRESOLINE) tablet 50 mg  50 mg Oral TID  [START ON 12/24/2019] levothyroxine (SYNTHROID) tablet 175 mcg  175 mcg Oral ACB  [START ON 12/24/2019] loperamide (IMODIUM) capsule 2 mg  2 mg Oral DAILY  [START ON 12/24/2019] pantoprazole (PROTONIX) tablet 40 mg  40 mg Oral ACB  [START ON 12/24/2019] potassium chloride SR (KLOR-CON 10) tablet 20 mEq  20 mEq Oral DAILY  [START ON 12/24/2019] thiamine HCL (B-1) tablet 100 mg  100 mg Oral DAILY  [START ON 12/24/2019] doxazosin (CARDURA) tablet 1 mg  1 mg Oral DAILY  prochlorperazine (COMPAZINE) with saline injection 5 mg  5 mg IntraVENous Q8H PRN Current Outpatient Medications Medication Sig  
 hydrALAZINE (APRESOLINE) 50 mg tablet Take 1 Tab by mouth three (3) times daily.  furosemide (LASIX) 40 mg tablet Take 40 mg by mouth two (2) times a day.  loperamide (IMODIUM) 2 mg capsule Take 2 mg by mouth daily.  potassium chloride SR (KLOR-CON 10) 10 mEq tablet Take 20 mEq by mouth daily.  guaiFENesin-dextromethorphan (ROBAFEN DM COUGH)  mg/5 mL liqd Take 30 mL by mouth every four (4) hours as needed.  verapamil HCl (VERAPAMIL PO) Take  by mouth. Verapamil ER 24 hour 90mg po once daily  diphenhydrAMINE (BENADRYL ALLERGY) 25 mg tablet Take 25 mg by mouth nightly as needed for Sleep.  apixaban (ELIQUIS) 2.5 mg tablet Take 2.5 mg by mouth two (2) times a day. Indications: Atrial Fibrillation  ferrous sulfate 325 mg (65 mg iron) tablet Take 325 mg by mouth two (2) times a day.  levothyroxine (SYNTHROID) 175 mcg tablet Take 175 mcg by mouth Daily (before breakfast).  melatonin 10 mg tab Take 1 Tab by mouth nightly.  omeprazole (PRILOSEC) 20 mg capsule Take 20 mg by mouth two (2) times a day.  thiamine HCL (B-1) 100 mg tablet Take 100 mg by mouth daily.  triamcinolone acetonide (KENALOG) 0.1 % topical cream Apply  to affected area two (2) times daily as needed for Skin Irritation or Itching. use thin layer  cholecalciferol, vitamin D3, (VITAMIN D3) 2,000 unit tab Take 1 Tab by mouth daily.  dutasteride (AVODART) 0.5 mg capsule Take 0.5 mg by mouth daily.  terazosin (HYTRIN) 10 mg capsule Take 10 mg by mouth daily. Indications: high blood pressure No Known Allergies Review of Systems: A comprehensive review of systems was negative except for that written in the History of Present Illness. Objective: Intake and Output:   
No intake/output data recorded. No intake/output data recorded. Physical Exam:  
Visit Vitals /78 Pulse (!) 42 Resp 17 SpO2 98% General appearance: fatigued, cooperative, no distress, appears stated age Lungs: clear to auscultation on right diminished breath sounds on left Heart: regular rate and rhythm Abdomen: soft, non-tender. Bowel sounds normal. No masses, abnormal findings:  hepatomegaly, liver edge palpable Extremities: extremities normal, atraumatic, no cyanosis or edema Pulses: 2+ and symmetric Skin: Skin color, texture, turgor normal. No rashes or lesions Data Review:  
Recent Results (from the past 24 hour(s)) CBC WITH AUTOMATED DIFF Collection Time: 12/23/19  9:45 AM  
Result Value Ref Range WBC 5.2 4.1 - 11.1 K/uL  
 RBC 3.07 (L) 4.10 - 5.70 M/uL HGB 9.5 (L) 12.1 - 17.0 g/dL HCT 31.3 (L) 36.6 - 50.3 % .0 (H) 80.0 - 99.0 FL  
 MCH 30.9 26.0 - 34.0 PG  
 MCHC 30.4 30.0 - 36.5 g/dL  
 RDW 18.6 (H) 11.5 - 14.5 % PLATELET 425 (L) 398 - 400 K/uL MPV 9.1 8.9 - 12.9 FL  
 NRBC 0.0 0  WBC ABSOLUTE NRBC 0.00 0.00 - 0.01 K/uL NEUTROPHILS 84 (H) 32 - 75 % LYMPHOCYTES 8 (L) 12 - 49 % MONOCYTES 5 5 - 13 % EOSINOPHILS 3 0 - 7 % BASOPHILS 0 0 - 1 % IMMATURE GRANULOCYTES 0 0.0 - 0.5 % ABS. NEUTROPHILS 4.3 1.8 - 8.0 K/UL  
 ABS. LYMPHOCYTES 0.4 (L) 0.8 - 3.5 K/UL  
 ABS. MONOCYTES 0.3 0.0 - 1.0 K/UL  
 ABS. EOSINOPHILS 0.2 0.0 - 0.4 K/UL  
 ABS. BASOPHILS 0.0 0.0 - 0.1 K/UL  
 ABS. IMM. GRANS. 0.0 0.00 - 0.04 K/UL  
 DF SMEAR SCANNED    
 PLATELET COMMENTS Large Platelets RBC COMMENTS ANISOCYTOSIS 1+ 
    
 RBC COMMENTS MACROCYTOSIS 1+ 
    
 RBC COMMENTS HYPOCHROMIA 1+ METABOLIC PANEL, COMPREHENSIVE Collection Time: 12/23/19  9:45 AM  
Result Value Ref Range Sodium 146 (H) 136 - 145 mmol/L Potassium 4.3 3.5 - 5.1 mmol/L Chloride 112 (H) 97 - 108 mmol/L  
 CO2 28 21 - 32 mmol/L Anion gap 6 5 - 15 mmol/L Glucose 120 (H) 65 - 100 mg/dL BUN 55 (H) 6 - 20 MG/DL Creatinine 2.35 (H) 0.70 - 1.30 MG/DL  
 BUN/Creatinine ratio 23 (H) 12 - 20 GFR est AA 32 (L) >60 ml/min/1.73m2 GFR est non-AA 26 (L) >60 ml/min/1.73m2 Calcium 8.9 8.5 - 10.1 MG/DL Bilirubin, total 0.5 0.2 - 1.0 MG/DL  
 ALT (SGPT) 29 12 - 78 U/L  
 AST (SGOT) 31 15 - 37 U/L Alk. phosphatase 75 45 - 117 U/L Protein, total 6.8 6.4 - 8.2 g/dL Albumin 3.1 (L) 3.5 - 5.0 g/dL Globulin 3.7 2.0 - 4.0 g/dL A-G Ratio 0.8 (L) 1.1 - 2.2    
TROPONIN I Collection Time: 12/23/19  9:45 AM  
Result Value Ref Range Troponin-I, Qt. <0.05 <0.05 ng/mL SAMPLES BEING HELD Collection Time: 12/23/19  9:45 AM  
Result Value Ref Range SAMPLES BEING HELD 1RED 1BLU   
 COMMENT Add-on orders for these samples will be processed based on acceptable specimen integrity and analyte stability, which may vary by analyte. URINALYSIS W/MICROSCOPIC Collection Time: 12/23/19  9:45 AM  
Result Value Ref Range Color YELLOW/STRAW Appearance CLEAR CLEAR Specific gravity 1.016 1.003 - 1.030    
 pH (UA) 5.0 5.0 - 8.0 Protein 100 (A) NEG mg/dL Glucose NEGATIVE  NEG mg/dL Ketone NEGATIVE  NEG mg/dL Bilirubin NEGATIVE  NEG Blood NEGATIVE  NEG Urobilinogen 0.2 0.2 - 1.0 EU/dL Nitrites NEGATIVE  NEG Leukocyte Esterase TRACE (A) NEG    
 WBC 5-10 0 - 4 /hpf  
 RBC 0-5 0 - 5 /hpf Epithelial cells FEW FEW /lpf Bacteria NEGATIVE  NEG /hpf Hyaline cast 2-5 0 - 5 /lpf URINE CULTURE HOLD SAMPLE Collection Time: 12/23/19  9:45 AM  
Result Value Ref Range Urine culture hold URINE ON HOLD IN MICROBIOLOGY DEPT FOR 3 DAYS. IF UNPRESERVED URINE IS SUBMITTED, IT CANNOT BE USED FOR ADDITIONAL TESTING AFTER 24 HRS, RECOLLECTION WILL BE REQUIRED. PROTHROMBIN TIME + INR Collection Time: 12/23/19  9:45 AM  
Result Value Ref Range INR 1.1 0.9 - 1.1 Prothrombin time 11.1 9.0 - 11.1 sec PTT Collection Time: 12/23/19  9:45 AM  
Result Value Ref Range aPTT 33.3 (H) 22.1 - 32.0 sec  
 aPTT, therapeutic range     58.0 - 77.0 SECS Chest x-ray 12/23/19 Indication: Shortness of breath 
  
Comparison: 12/17/2019 
  
Portable exam of the chest obtained at 10:15 demonstrates a significant increase 
in the left pleural effusion, now large in size. Underlying consolidation is 
noted. 
  
IMPRESSION Impression: Increased left effusion with underlying consolidation. Assessment:  
 
Active Problems: Dyspnea (12/23/2019) Pleural effusion (12/23/2019) Plan:  
Recommend admission to hospitalist service Plan to place pleur-X catheter today Signed By: Filiberto Evans NP December 23, 2019

## 2019-12-24 NOTE — OP NOTES
[address]  
OPERATIVE REPORT Name:  Carmita Vasquez 
MR#:  [id number] :  1926 ACCOUNT #:  [de-identified] DATE OF SERVICE:  2019 CLINICAL SERVICE:  Thoracic Surgery. ATTENDING SURGEON:  Bettina Flores MD 
 
OPERATIONS PERFORMED: 
1. Placement of left PleurX catheter. 2.  Intraoperative ultrasound with interpretation. PREOPERATIVE DIAGNOSES: 
1. Left malignant pleural effusion. 2.  B-cell lymphoma. 3.  Sinus bradycardia. POSTOPERATIVE DIAGNOSES: 
1. Left malignant pleural effusion. 2.  B-cell lymphoma. 3.  Sinus bradycardia. FIRST ASSISTANT:  None. SPECIMENS SENT:  None. DRAINS AND TUBES:  A tunneled PleurX catheter was left within the left hemithorax. ANESTHESIA:  MAC with local. 
 
ESTIMATED BLOOD LOSS:  10 mL. INDICATIONS FOR PROCEDURE:  The patient is a 35-year-old gentleman well-known to the Thoracic Surgery service. He was originally seen several weeks ago as an inpatient and then again in the outpatient setting. He was diagnosed with a pleural effusion, and cytology was positive for B-cell lymphoma. We had initiated oncology workup. Unfortunately, he has not yet gotten his PET scan. He re-presented to the emergency department with general malaise, lethargy, dyspnea, hypoxia, and sinus bradycardia with the heart rate in the 30s. He was diagnosed with a very large recurrent left pleural effusion. Given the fact that this is a known malignant effusion and will continue to recur, the decision was made to proceed for a PleurX catheter placement for long-term management. The patient is noted to be on Eliquis, however, the urgent nature of the procedure overrode concerns for bleeding. PROCEDURE IN DETAIL:  After informed consent was obtained and placed on the chart, the patient was taken to the operating room and placed supine on the operative table. MAC anesthesia was induced without complication. Preop antibiotics administered. The patient was then placed with the left side bumped slightly up in a seated position. The patient's left chest was prepped and draped in a sterile fashion. Time-out was performed. Approximately 30 mL of 1% lidocaine mixed with 0.25% Marcaine was used as a local anesthetic. Ultrasound was brought onto the field and the site in approximately the seventh intercostal space was marked using ultrasound guidance. A large introducer needle was used and there was easy aspiration of a very yellow-tinged serous fluid. Guidewire was placed. A counter incision was then made anteriorly. The PleurX catheter was then tunneled anterior to posterior coming out of the incision for the guidewire. Dilator was then placed over the guidewire. Obturator dilator was then placed over the guidewire. Guidewire and dilator were removed, and the PleurX catheter was inserted into the hemithorax via the obturator sheath. This was then peeled away. The PleurX catheter was then connected to very low wall suction. Four liters of effusion was then drained out of the chest.  We stopped after 4 liters. The posterior counter incision was then closed in a multilayered fashion and covered with Dermabond. The anterior incision was then closed. The PleurX catheter was sterilely capped off and bandaged appropriately. The patient was then reversed from [place] [id number] anesthesia and taken to PACU in guarded condition. All surgical counts correct x2 at the end of the case. There were no immediate complications identified during this case. Dr. Sandrita Holcomb was present and scrubbed throughout the entire procedure. Oneal Verdugo MD 
 
 
[id number] 
D:  12/23/2019 16:35 
T:  12/23/2019 22:20 
JOB #:  [id number] CC:  Silke Shin MD

## 2019-12-24 NOTE — DISCHARGE INSTRUCTIONS
Discharge Instructions       PATIENT ID: Karlos Aguilar  MRN: [id number]   YOB: 1926    DATE OF ADMISSION: 12/23/2019  9:31 AM    DATE OF DISCHARGE: 12/24/2019    PRIMARY CARE PROVIDER: Brandon Rubi DO     ATTENDING PHYSICIAN: Chris Aceves MD  DISCHARGING PROVIDER: Que Herman MD    To contact this individual call [phone] and ask the  to page. If unavailable ask to be transferred the Adult Hospitalist Department. DISCHARGE DIAGNOSES malignant pleural effusion     CONSULTATIONS: IP CONSULT TO THORACIC SURGERY  IP CONSULT TO ONCOLOGY  IP CONSULT TO CARDIOLOGY    PROCEDURES/SURGERIES: Procedure(s):  CHEST TUBES INSERTION    PENDING TEST RESULTS:   At the time of discharge the following test results are still pending: none     FOLLOW UP APPOINTMENTS:   Follow-up Information     Follow up With Specialties Details Why Contact Info    Brandon Rubi DO Internal Medicine In 1 week  [place] [address]  [phone]             ADDITIONAL CARE RECOMMENDATIONS:   1. Fall precaution, pleurx drainage every day   2. Follow up your PCP and oncologist in 1 week   3. Your Verapamil was stopped per cardiologist recommendation   4.follow oncologist, PET scan which is scheduled for 12/27/19     DIET: Regular Diet      ACTIVITY: Activity as tolerated          DISCHARGE MEDICATIONS:   See Medication Reconciliation Form    · It is important that you take the medication exactly as they are prescribed. · Keep your medication in the bottles provided by the pharmacist and keep a list of the medication names, dosages, and times to be taken in your wallet. · Do not take other medications without consulting your doctor. NOTIFY YOUR PHYSICIAN FOR ANY OF THE FOLLOWING:   Fever over 101 degrees for 24 hours. Chest pain, shortness of breath, fever, chills, nausea, vomiting, diarrhea, change in mentation, falling, weakness, bleeding. Severe pain or pain not relieved by medications.   Or, any other signs or symptoms that you may have questions about. DISPOSITION:    Home With:   OT  PT  HH  RNx      x SNF/Inpatient Rehab/LTAC    Independent/assisted living    Hospice    Other:     CDMP Checked:   Yes x     PROBLEM LIST Updated:  Yes x       Signed:   Paul Jara MD  12/24/2019  9:46 AM  _______________________________________________________________      5 [facility]  [address], [address] [facility] 22.  [phone]    Future Appointments   Date Time Provider Andres Ball   12/27/2019  3:00 PM [facility] PET INJ 1 [facility]   12/27/2019  4:00 PM [facility] ER PET 1 [facility] REG   1/7/2020 10:00 AM DEBRA [facility]   1/7/2020  2:00 PM Orman Favre, MD [address] [place] [address] Rd left PleurX catheter & redress left chest every other day. Start 12/26/2019  Do not drain more than 1L daily.

## 2019-12-24 NOTE — PROGRESS NOTES
1230 Report called to [facility] at [facility]. Instructions reviewed. IV removed. Chest tube in place with dry intact dressing. Family to take PleurX Kit to facility. Pt discharged to facility via daughter for transport.

## 2019-12-24 NOTE — PROGRESS NOTES
Observation notice provided in writing to patient and/or caregiver as well as verbal explanation of the policy. Patients who are in outpatient status also receive the Observation notice. CM offered screening for Medicaid Long-Term Services & Supports. Family Declined screening. Transition of Care Plan to SNF/Rehab 
 
SNF/Rehab Transition: 
Patient has been accepted to [facility] and meets criteria for admission. Patient will transported by daughter Kaye Naidu and expected to leave at 2:00 pm 
 
Communication to Patient/Family: Met with patient and Kaye Naidu and they are agreeable to the transition plan. Communication to SNF/Rehab: 
Bedside RN, Elicia Colvin, has been notified to update the transition plan to the facility and call report (phone number [phone]). Discharge information has been updated on the AVS. Discharge instructions to be fax'd to facility at [facility] # [phone]) SNF/Rehab Transition: 
Patient to follow-up with Home Health: [facility], other  n/a,none) PCP/Specialist:n/a Ambulatory Care Management: n/a Reviewed and confirmed with facility, Demetri Hanson they can manage the patient care needs for the following:  
 
Eliceo Elizabeth with (X) only those applicable: 
 
Medication: 
[x]  Medications will be available at the facility []  IV Antibiotics n/a 
[]  Controlled Substance - hard copy to be sent with patient  
[]  Weekly Labs Documents: 
[] Hard RX [x] MAR [x] Kardex [x] AVS 
[]Transfer Summary 
[x]Discharge Equipment: 
[]  CPAP/BiPAP []  Wound Vacuum 
[]  William or Urinary Device 
[]  PICC/Central Line 
[]  Nebulizer 
[]  Ventilator Treatment: 
[]Isolation (for MRSA, VRE, etc.) [x]Surgical Drain Management Pleur X drain []Tracheostomy Care 
[]Dressing Changes []Dialysis with transportation and chair time NA 
[]PEG Care []Oxygen []Daily Weights for Heart Failure Dietary: 
[]Any diet limitations []Tube Feedings []Total Parenteral Management (TPN) Eligible for Medicaid Long Term Services and Supports Yes: 
[] Eligible for medical assistance or will become eligible within 180 days and UAI completed. [] Provider/Patient and/or support system has requested screening. [] UAI copy provided to patient or responsible party, NA. 
[] UAI unavailable at discharge will send once processed to SNF provider. [] UAI unavailable at discharged mailed to patient No:  
[] Private pay and is not financially eligible for Medicaid within the next 180 days. [] Reside out-of-state. [] A residents of a state owned/operated facility that is licensed 
by [address] and [facility] or [facility] 
[] Enrollment in [facility] hospice services 
[] [address] 
[x] Patient /Family declines to have screening completed or provide financial information for screening Financial Resources: 
Medicaid   
[] Initiated and application pending  
[] Full coverage DNR Other Nurse to call [phone] for report. Patient going into room 4815 B.

## 2019-12-24 NOTE — CONSULTS
[facility] [place] [facility] 
[address], [address] W: [phone]  F: [phone] Reason for Conult:  
Graham iHnojosa is a 80 y.o. male who is seen in consultation at the request of Dr. Jeanella Klinefelter for evaluation of B cell lymphoma. Treatment History:  
· 12/6/19 pleural effusion biopsy consistent with B-cell lymphoma History of Present Illness: This is a 80 y.o. male with a PMH significant for colon cancer, heart failure, a.fib, CKD, and hypothyroidism. Patient has recently had recurrent pleural effusions and was most recently admitted 12/4/19-12/9/19 and had a left sided thoracentesis. Cytology of his pleural fluid was consistent with B-cell lymphoma. He had a PET scan schedule and has an appiontment with Dr. Shaquille Reyes for 1/7/20 already set up. However, his SOB worsening and he was admitted to the hospital  from [address] [place] [address] on 12/23/19. Dr. Kishor Wise with thoracic surgery was consulted and he placed a pleurx drain. Patient reports improved breathing since having the drain place. Patient reports some weight loss over the last 3 months, about 5-10 pounds. He is normally fairly active but has not been able to walk much recently due to weakness. Patient also reports a change in his voice which is a rattle. The plan is for the patient and family to have teaching on the pleurx drain and be discharged home with home health. Past Medical History:  
Diagnosis Date  Arrhythmia   
 irrigular heart beat- A FIB  Cancer [facility]) 2013 COLON   
 Cancer ([facility] SKIN  
 Colon cancer ([facility] 7/1/2013  Heart failure ([facility]  Hypertension Past Surgical History:  
Procedure Laterality Date  HX OTHER SURGICAL    
 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  
 HX TONSIL AND ADENOIDECTOMY AS CHILD Social History Tobacco Use  Smoking status: Former Smoker Packs/day: 0.50   Years: 32.00  
 Pack years: 16.00 Types: Cigarettes Last attempt to quit: 1983 Years since quittin.5  Smokeless tobacco: Never Used Substance Use Topics  Alcohol use: Yes Comment: edie/gin 7 per week Family History Problem Relation Age of Onset  Colon Cancer Mother  Lung Cancer Father  Cancer Brother UNKNOWN Current Facility-Administered Medications Medication Dose Route Frequency  sodium chloride (NS) flush 5-40 mL  5-40 mL IntraVENous Q8H  
 sodium chloride (NS) flush 5-40 mL  5-40 mL IntraVENous PRN  
 acetaminophen (TYLENOL) tablet 650 mg  650 mg Oral Q4H PRN  
 HYDROcodone-acetaminophen (NORCO) 5-325 mg per tablet 1 Tab  1 Tab Oral Q4H PRN  
 HYDROmorphone (PF) (DILAUDID) injection 0.5 mg  0.5 mg IntraVENous Q4H PRN  
 diphenhydrAMINE (BENADRYL) capsule 25 mg  25 mg Oral QHS PRN  
 dutasteride (AVODART) capsule 0.5 mg  0.5 mg Oral DAILY  ferrous sulfate tablet 325 mg  325 mg Oral BID  furosemide (LASIX) tablet 40 mg  40 mg Oral BID  levothyroxine (SYNTHROID) tablet 175 mcg  175 mcg Oral ACB  pantoprazole (PROTONIX) tablet 40 mg  40 mg Oral ACB  potassium chloride SR (KLOR-CON 10) tablet 20 mEq  20 mEq Oral DAILY  thiamine HCL (B-1) tablet 100 mg  100 mg Oral DAILY  doxazosin (CARDURA) tablet 1 mg  1 mg Oral DAILY  prochlorperazine (COMPAZINE) with saline injection 5 mg  5 mg IntraVENous Q8H PRN  
 loperamide (IMODIUM) capsule 2 mg  2 mg Oral DAILY PRN  
 sodium chloride (NS) flush 5-40 mL  5-40 mL IntraVENous Q8H  
 sodium chloride (NS) flush 5-40 mL  5-40 mL IntraVENous PRN No Known Allergies Review of Systems: A complete review of systems was obtained, negative except as described above. Physical Exam:  
 
Visit Vitals /80 (BP 1 Location: Right arm, BP Patient Position: Sitting) Pulse (!) 53 Temp (!) 94.4 °F (34.7 °C) Resp 18 Ht 6' 2\" (1.88 m) Wt 151 lb 14.4 oz (68.9 kg) SpO2 96% BMI 19.50 kg/m² ECOG PS: 3 General: No distress Eyes: PERRL, anicteric sclerae HENT: Atraumatic, rattle of voice Neck: Supple Respiratory: Normal respiratory effort CV: No peripheral edema MS: Seen sitting in chair. Muscle wasting present Skin: Warm and dry Psych: Alert, oriented, appropriate affect, normal judgment/insight Results:  
 
Lab Results Component Value Date/Time WBC 5.2 12/23/2019 09:45 AM  
 HGB 9.5 (L) 12/23/2019 09:45 AM  
 HCT 31.3 (L) 12/23/2019 09:45 AM  
 PLATELET 047 (L) 54/46/7149 09:45 AM  
 .0 (H) 12/23/2019 09:45 AM  
 ABS. NEUTROPHILS 4.3 12/23/2019 09:45 AM  
 
Lab Results Component Value Date/Time Sodium 146 (H) 12/23/2019 09:45 AM  
 Potassium 4.3 12/23/2019 09:45 AM  
 Chloride 112 (H) 12/23/2019 09:45 AM  
 CO2 28 12/23/2019 09:45 AM  
 Glucose 120 (H) 12/23/2019 09:45 AM  
 BUN 55 (H) 12/23/2019 09:45 AM  
 Creatinine 2.35 (H) 12/23/2019 09:45 AM  
 GFR est AA 32 (L) 12/23/2019 09:45 AM  
 GFR est non-AA 26 (L) 12/23/2019 09:45 AM  
 Calcium 8.9 12/23/2019 09:45 AM  
 Glucose (POC) 148 (H) 07/11/2013 09:36 PM  
 
Lab Results Component Value Date/Time Bilirubin, total 0.5 12/23/2019 09:45 AM  
 ALT (SGPT) 29 12/23/2019 09:45 AM  
 AST (SGOT) 31 12/23/2019 09:45 AM  
 Alk. phosphatase 75 12/23/2019 09:45 AM  
 Protein, total 6.8 12/23/2019 09:45 AM  
 Albumin 3.1 (L) 12/23/2019 09:45 AM  
 Globulin 3.7 12/23/2019 09:45 AM  
 
12/5/16 CYTOLOGIC INTERPRETATION:  
Pleural Fluid, ThinPrep and cell block:  
B-cell lymphoma, see comment General Categorization Positive for malignancy. 12/7/19 CT CHEST IMPRESSION: 
Limited by lack of intravenous contrast. 
Cardiomegaly. Left lower lobe infiltrate. Residual small bilateral effusions. Records reviewed and summarized above. Pathology report(s) reviewed above. Radiology report(s) reviewed above. Assessment:  
1) B-cell lymphoma Bx of recurrent pleural effusion positive for B-cell lymphoma. Likely large cell. Patient with ECOG PS of 3, so patient likely not a good candidate for chemotherapy. Discussed this with patient briefly and daughter. Discussed options of treatment with chemotherapy vs. supportive care. Supportive care would likely be the best option due to the patient's health status and multiple chronic illnesses. Patient will need a PET scan which is scheduled for 12/27/19 
 
2) Symptomatic malignant pleural effusion S/p pleurx drain placement. Management per Dr. Hesham Harrison 3) History of colon cancer 4) Chronic diastolic congestive heart failure Patient currently on cardura and lasix Management per cardiology 5) Chronic a. fib and bradycardia Management per cardiology 6) CKD/HTN/Hypothyroidism Management per primary team 
 
Plan:  
 
· PET scan scheduled for Friday 12/27/19 · Patient has an appointment with Dr. Matthew Gilbert in the office 1/7/20. Advised family to call to move appointment up if their schedules allow. I appreciate the opportunity to participate in Lorenza Ulysses De La Paz's care.

## 2019-12-24 NOTE — ADVANCED PRACTICE NURSE
12/24/2019 10:45 PleurX catheter teaching performed with patient & daughter in room 500ml dark red thin fluid removed. Patient & family verbalized understanding and questions were answered to the best of my knowledge and ability. Left chest drain area redressed Patient tolerated procedure well. Patient to be discharged to SNF at [facility] later today. Given PleurX kit with 4 bottles enclosed. Home health orders placed. Signed By: DEBRA Yañez December 24, 2019

## 2019-12-24 NOTE — PROGRESS NOTES
Problem: Diabetes Self-Management Goal: *Disease process and treatment process Description Define diabetes and identify own type of diabetes; list 3 options for treating diabetes. Outcome: Resolved/Met Goal: *Incorporating nutritional management into lifestyle Description Describe effect of type, amount and timing of food on blood glucose; list 3 methods for planning meals. Outcome: Resolved/Met Goal: *Incorporating physical activity into lifestyle Description State effect of exercise on blood glucose levels. Outcome: Resolved/Met Goal: *Developing strategies to promote health/change behavior Description Define the ABC's of diabetes; identify appropriate screenings, schedule and personal plan for screenings. Outcome: Resolved/Met Goal: *Using medications safely Description State effect of diabetes medications on diabetes; name diabetes medication taking, action and side effects. Outcome: Resolved/Met Goal: *Monitoring blood glucose, interpreting and using results Description Identify recommended blood glucose targets  and personal targets. Outcome: Resolved/Met Goal: *Prevention, detection, treatment of acute complications Description List symptoms of hyper- and hypoglycemia; describe how to treat low blood sugar and actions for lowering  high blood glucose level. Outcome: Resolved/Met Goal: *Prevention, detection and treatment of chronic complications Description Define the natural course of diabetes and describe the relationship of blood glucose levels to long term complications of diabetes. Outcome: Resolved/Met Goal: *Developing strategies to address psychosocial issues Description Describe feelings about living with diabetes; identify support needed and support network Outcome: Resolved/Met Goal: *Insulin pump training Outcome: Resolved/Met Goal: *Sick day guidelines Outcome: Resolved/Met Goal: *Patient Specific Goal (EDIT GOAL, INSERT TEXT) Outcome: Resolved/Met Problem: Patient Education: Go to Patient Education Activity Goal: Patient/Family Education Outcome: Resolved/Met Problem: Falls - Risk of 
Goal: *Absence of Falls Description Document Devere Britton Fall Risk and appropriate interventions in the flowsheet. Outcome: Resolved/Met Note: Fall Risk Interventions: 
Mobility Interventions: Bed/chair exit alarm, Communicate number of staff needed for ambulation/transfer, Patient to call before getting OOB Mentation Interventions: Adequate sleep, hydration, pain control, Bed/chair exit alarm, Door open when patient unattended, Family/sitter at bedside Medication Interventions: Evaluate medications/consider consulting pharmacy, Teach patient to arise slowly Elimination Interventions: Bed/chair exit alarm, Call light in reach, Patient to call for help with toileting needs Problem: Patient Education: Go to Patient Education Activity Goal: Patient/Family Education Outcome: Resolved/Met

## 2019-12-24 NOTE — PROGRESS NOTES
Thoracic Surgery Simple Progress Note Admit Date: 2019 POD: 1 Day Post-Op Procedure:  Procedure(s): CHEST TUBES INSERTION Subjective:  
 
Patient has complaints: No significant medical complaints 
very confused Review of Systems: 
 
CARDIAC: positive for H/O heart failure,A-fib, HTN 
RESP: positive for recurrent left pleural effusion NEURO:  negative INCISION: Clean, dry, and intact EXT: Denies new swelling or pain in the legs or calves. Objective:  
 
Blood pressure 158/53, pulse 62, temperature 97.8 °F (36.6 °C), resp. rate 18, height 6' 2\" (1.88 m), weight 151 lb 14.4 oz (68.9 kg), SpO2 99 %. Temp (24hrs), Av.8 °F (36.6 °C), Min:97.4 °F (36.3 °C), Max:98 °F (36.7 °C) Hemodynamics PAP 
  CO 
  CI No intake/output data recorded.  1901 -  0700 In: 650 [I.V.:650] Out: 100 [Urine:100] EXAM: 
GENERAL: VSS, afrible, sitting up in chair, confused. Staff states he became confused last night. HEART:  regular rate and rhythm LUNG: RR even unlabored on room air, NEURO:  normal without focal findings 
mental status, speech normal, and oriented to person only. INCISION: Clean, dry, and intact EXTREMITIES:No evidence of DVT seen on physical exam. 
GI/: Abd soft, nonterder with + bowel sounds. Voiding Labs: 
Recent Results (from the past 24 hour(s)) EKG, 12 LEAD, INITIAL Collection Time: 19  9:39 AM  
Result Value Ref Range Ventricular Rate 59 BPM  
 Atrial Rate 87 BPM  
 QRS Duration 92 ms Q-T Interval 486 ms QTC Calculation (Bezet) 481 ms Calculated R Axis -36 degrees Calculated T Axis 84 degrees Diagnosis Atrial fibrillation with ventricular escape complexes Left axis deviation Septal infarct (cited on or before 2019) When compared with ECG of 04-DEC-2019 12:33, 
premature ventricular complexes are no longer present Sinus rhythm is now with ventricular escape complexes Questionable change in initial forces of Septal leads QT has lengthened CBC WITH AUTOMATED DIFF Collection Time: 12/23/19  9:45 AM  
Result Value Ref Range WBC 5.2 4.1 - 11.1 K/uL  
 RBC 3.07 (L) 4.10 - 5.70 M/uL HGB 9.5 (L) 12.1 - 17.0 g/dL HCT 31.3 (L) 36.6 - 50.3 % .0 (H) 80.0 - 99.0 FL  
 MCH 30.9 26.0 - 34.0 PG  
 MCHC 30.4 30.0 - 36.5 g/dL  
 RDW 18.6 (H) 11.5 - 14.5 % PLATELET 978 (L) 334 - 400 K/uL MPV 9.1 8.9 - 12.9 FL  
 NRBC 0.0 0  WBC ABSOLUTE NRBC 0.00 0.00 - 0.01 K/uL NEUTROPHILS 84 (H) 32 - 75 % LYMPHOCYTES 8 (L) 12 - 49 % MONOCYTES 5 5 - 13 % EOSINOPHILS 3 0 - 7 % BASOPHILS 0 0 - 1 % IMMATURE GRANULOCYTES 0 0.0 - 0.5 % ABS. NEUTROPHILS 4.3 1.8 - 8.0 K/UL  
 ABS. LYMPHOCYTES 0.4 (L) 0.8 - 3.5 K/UL  
 ABS. MONOCYTES 0.3 0.0 - 1.0 K/UL  
 ABS. EOSINOPHILS 0.2 0.0 - 0.4 K/UL  
 ABS. BASOPHILS 0.0 0.0 - 0.1 K/UL  
 ABS. IMM. GRANS. 0.0 0.00 - 0.04 K/UL  
 DF SMEAR SCANNED    
 PLATELET COMMENTS Large Platelets RBC COMMENTS ANISOCYTOSIS 1+ 
    
 RBC COMMENTS MACROCYTOSIS 1+ 
    
 RBC COMMENTS HYPOCHROMIA 1+ METABOLIC PANEL, COMPREHENSIVE Collection Time: 12/23/19  9:45 AM  
Result Value Ref Range Sodium 146 (H) 136 - 145 mmol/L Potassium 4.3 3.5 - 5.1 mmol/L Chloride 112 (H) 97 - 108 mmol/L  
 CO2 28 21 - 32 mmol/L Anion gap 6 5 - 15 mmol/L Glucose 120 (H) 65 - 100 mg/dL BUN 55 (H) 6 - 20 MG/DL Creatinine 2.35 (H) 0.70 - 1.30 MG/DL  
 BUN/Creatinine ratio 23 (H) 12 - 20 GFR est AA 32 (L) >60 ml/min/1.73m2 GFR est non-AA 26 (L) >60 ml/min/1.73m2 Calcium 8.9 8.5 - 10.1 MG/DL Bilirubin, total 0.5 0.2 - 1.0 MG/DL  
 ALT (SGPT) 29 12 - 78 U/L  
 AST (SGOT) 31 15 - 37 U/L Alk. phosphatase 75 45 - 117 U/L Protein, total 6.8 6.4 - 8.2 g/dL Albumin 3.1 (L) 3.5 - 5.0 g/dL Globulin 3.7 2.0 - 4.0 g/dL A-G Ratio 0.8 (L) 1.1 - 2.2    
TROPONIN I  Collection Time: 12/23/19  9:45 AM  
 Result Value Ref Range Troponin-I, Qt. <0.05 <0.05 ng/mL SAMPLES BEING HELD Collection Time: 12/23/19  9:45 AM  
Result Value Ref Range SAMPLES BEING HELD 1RED 1BLU   
 COMMENT Add-on orders for these samples will be processed based on acceptable specimen integrity and analyte stability, which may vary by analyte. URINALYSIS W/MICROSCOPIC Collection Time: 12/23/19  9:45 AM  
Result Value Ref Range Color YELLOW/STRAW Appearance CLEAR CLEAR Specific gravity 1.016 1.003 - 1.030    
 pH (UA) 5.0 5.0 - 8.0 Protein 100 (A) NEG mg/dL Glucose NEGATIVE  NEG mg/dL Ketone NEGATIVE  NEG mg/dL Bilirubin NEGATIVE  NEG Blood NEGATIVE  NEG Urobilinogen 0.2 0.2 - 1.0 EU/dL Nitrites NEGATIVE  NEG Leukocyte Esterase TRACE (A) NEG    
 WBC 5-10 0 - 4 /hpf  
 RBC 0-5 0 - 5 /hpf Epithelial cells FEW FEW /lpf Bacteria NEGATIVE  NEG /hpf Hyaline cast 2-5 0 - 5 /lpf URINE CULTURE HOLD SAMPLE Collection Time: 12/23/19  9:45 AM  
Result Value Ref Range Urine culture hold URINE ON HOLD IN MICROBIOLOGY DEPT FOR 3 DAYS. IF UNPRESERVED URINE IS SUBMITTED, IT CANNOT BE USED FOR ADDITIONAL TESTING AFTER 24 HRS, RECOLLECTION WILL BE REQUIRED. PROTHROMBIN TIME + INR Collection Time: 12/23/19  9:45 AM  
Result Value Ref Range INR 1.1 0.9 - 1.1 Prothrombin time 11.1 9.0 - 11.1 sec PTT Collection Time: 12/23/19  9:45 AM  
Result Value Ref Range aPTT 33.3 (H) 22.1 - 32.0 sec  
 aPTT, therapeutic range     58.0 - 77.0 SECS  
TSH 3RD GENERATION Collection Time: 12/23/19  9:45 AM  
Result Value Ref Range TSH 4.08 (H) 0.36 - 3.74 uIU/mL Assessment:  
No evidence of DVT. Active Problems: Dyspnea (12/23/2019) Pleural effusion (12/23/2019) PATH:                                    
Plan/Recommendations:  
Consult  for pleurX care and teaching Case management consult I will drain him and do some teaching later today when his mentation clears See orders Signed By: Catherine LLOYD

## 2019-12-24 NOTE — DISCHARGE SUMMARY
Discharge Summary PATIENT ID: Mckenzie Ibanez MRN: [id number] YOB: 1926 DATE OF ADMISSION: 12/23/2019  9:31 AM   
DATE OF DISCHARGE: 12/24/2019 PRIMARY CARE PROVIDER: Annalise Galan DO  
 
ATTENDING PHYSICIAN: Bipin Shelley md  
DISCHARGING PROVIDER: Chaparrita Danielle MD   
To contact this individual call [phone] and ask the  to page. If unavailable ask to be transferred the Adult Hospitalist Department. CONSULTATIONS: IP CONSULT TO THORACIC SURGERY 
IP CONSULT TO ONCOLOGY 
IP CONSULT TO CARDIOLOGY PROCEDURES/SURGERIES: Procedure(s): CHEST TUBES INSERTION 
 
ADMITTING DIAGNOSES & HOSPITAL COURSE:  
  
1) Symptomatic malignant pleural effusion S/p pleurx drain placement 12/23 by TAMARA PleurX catheter teaching performed with patient & daughter in room by TAMARA RICHARDSON And 500ml dark red thin fluid removed 12/24 prior discharge. Patient to be discharged to [facility] and Home nurse arranged to Fluid drainage every other day. Given PleurX kit with 4 bottles enclosed. 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 Oncologist consulted. Discussed options of treatment with chemotherapy vs. supportive care. Supportive care would likely be the best option due to the patient's health status and multiple chronic illnesses. Patient will need a PET scan which is scheduled for 12/27/19 Follow with oncologist  
  
3) History of colon cancer   
4) Chronic diastolic congestive heart failure Continue lasix   
5) Chronic a. fib and bradycardia Management per cardiology 
-recommended to  avoid AV deepthi blocking agents such as beta blockers or CCB, so his home verapamil stopped   
6) CKD/HTN/Hypothyroidism Stable DISCHARGE PLAN:   
 
1.  home pleurx tube drainage every other day 2. Follow up oncologist and outpatient PET as scheduled PENDING TEST RESULTS:  
 At the time of discharge the following test results are still pending: none FOLLOW UP APPOINTMENTS:   
Follow-up Information Follow up With Specialties Details Why Contact Info Derick Colunga, DO Internal Medicine In 1 week  [address] 
[phone] [facility]    Tel # [phone] DIET: Regular Diet ACTIVITY: Activity as tolerated DISCHARGE MEDICATIONS: 
Current Discharge Medication List  
  
CONTINUE these medications which have NOT CHANGED Details  
furosemide (LASIX) 40 mg tablet Take 40 mg by mouth two (2) times a day. loperamide (IMODIUM) 2 mg capsule Take 2 mg by mouth daily as needed. potassium chloride SR (KLOR-CON 10) 10 mEq tablet Take 20 mEq by mouth daily. guaiFENesin-dextromethorphan (ROBAFEN DM COUGH)  mg/5 mL liqd Take 30 mL by mouth every four (4) hours as needed. diphenhydrAMINE (BENADRYL ALLERGY) 25 mg tablet Take 12.5 mg by mouth nightly as needed for Sleep.  
  
apixaban (ELIQUIS) 2.5 mg tablet Take 2.5 mg by mouth two (2) times a day. Indications: Atrial Fibrillation  
  
ferrous sulfate 325 mg (65 mg iron) tablet Take 325 mg by mouth two (2) times a day. levothyroxine (SYNTHROID) 175 mcg tablet Take 175 mcg by mouth Daily (before breakfast). melatonin 10 mg tab Take 1 Tab by mouth nightly. omeprazole (PRILOSEC) 20 mg capsule Take 20 mg by mouth two (2) times a day. thiamine HCL (B-1) 100 mg tablet Take 100 mg by mouth daily. triamcinolone acetonide (KENALOG) 0.1 % topical cream Apply  to affected area two (2) times daily as needed for Skin Irritation or Itching. use thin layer  
  
cholecalciferol, vitamin D3, (VITAMIN D3) 2,000 unit tab Take 1 Tab by mouth daily. dutasteride (AVODART) 0.5 mg capsule Take 0.5 mg by mouth daily. terazosin (HYTRIN) 10 mg capsule Take 10 mg by mouth daily. Indications: high blood pressure STOP taking these medications  
  
 verapamil ER (CALAN-SR) 180 mg CR tablet Comments:  
Reason for Stopping:   
   
  
 
 
 
NOTIFY YOUR PHYSICIAN FOR ANY OF THE FOLLOWING:  
Fever over 101 degrees for 24 hours. Chest pain, shortness of breath, fever, chills, nausea, vomiting, diarrhea, change in mentation, falling, weakness, bleeding. Severe pain or pain not relieved by medications. Or, any other signs or symptoms that you may have questions about. DISPOSITION: 
  Home With: 
 OT  PT  HH  RN  
  
 Long term SNF/Inpatient Rehab Independent/assisted living Hospice  
x Other: MONIQUE at  [facility] PATIENT CONDITION AT DISCHARGE:  
 
Functional status Poor   
x Deconditioned Independent Cognition Lucid   
x Forgetful Dementia Catheters/lines (plus indication) chest pleurx tube William PICC   
 PEG None Code status Full code   
x DNR PHYSICAL EXAMINATION AT DISCHARGE: 
 
GENERAL: VSS, afrible, sitting up in chair, coopeative HEART:  regular rate and rhythm LUNG: RR even unlabored on room air, CT dressing clean NEURO:  normal without focal findings 
mental status, speech normal, and oriented to person only. INCISION: Clean, dry, and intact EXTREMITIES:No evidence of DVT seen on physical exam. 
GI/: Abd soft, nonterder with + bowel sounds. Voiding 
  
 
 
CHRONIC MEDICAL DIAGNOSES: 
Problem List as of 12/24/2019 Date Reviewed: 12/17/2019 Codes Class Noted - Resolved Dyspnea ICD-10-CM: R06.00 
ICD-9-CM: 786.09  12/23/2019 - Present Pleural effusion ICD-10-CM: J90 ICD-9-CM: 511.9  12/23/2019 - Present SOB (shortness of breath) ICD-10-CM: R06.02 
ICD-9-CM: 786.05  12/4/2019 - Present Acute on chronic systolic CHF (congestive heart failure) (HCC) ICD-10-CM: I93.23 ICD-9-CM: 428.23, 428.0  7/25/2019 - Present Colon cancer [facility]) ICD-10-CM: C18.9 ICD-9-CM: 153.9  7/1/2013 - Present Greater than 30  minutes were spent with the patient on counseling and coordination of care Signed: Anthony Arango MD 
12/24/2019 
12:31 PM

## 2019-12-24 NOTE — ROUTINE PROCESS
0710 Pt insistent on going home. Pt also refusing 0700 meds stated \"I'm not going to take any pills because i'm going home. Please leave me alone until 7:30. \"

## 2019-12-24 NOTE — ROUTINE PROCESS
Bedside and Verbal shift change report given to Dayanara García (oncoming nurse) by Dee Feliciano (offgoing nurse). Report included the following information SBAR, Kardex, ED Summary, OR Summary, Intake/Output, MAR, Recent Results and Cardiac Rhythm Sinus Kelsey Rodney.

## 2019-12-26 NOTE — PROGRESS NOTES
Transition of care outreach postponed for 21 days due to patient's discharge to non-preferred network SNF. Patient discharged to [facility].

## 2020-01-01 ENCOUNTER — HOSPITAL ENCOUNTER (OUTPATIENT)
Dept: GENERAL RADIOLOGY | Age: 85
Discharge: HOME | End: 2020-01-23
Payer: MEDICARE

## 2020-01-01 ENCOUNTER — APPOINTMENT (OUTPATIENT)
Dept: GENERAL RADIOLOGY | Age: 85
DRG: 919 | End: 2020-01-01
Attending: EMERGENCY MEDICINE
Payer: MEDICARE

## 2020-01-01 ENCOUNTER — OFFICE VISIT (OUTPATIENT)
Dept: SURGERY | Age: 85
End: 2020-01-01

## 2020-01-01 ENCOUNTER — APPOINTMENT (OUTPATIENT)
Dept: CT IMAGING | Age: 85
DRG: 919 | End: 2020-01-01
Attending: EMERGENCY MEDICINE
Payer: MEDICARE

## 2020-01-01 ENCOUNTER — OFFICE VISIT (OUTPATIENT)
Dept: ONCOLOGY | Age: 85
End: 2020-01-01

## 2020-01-01 ENCOUNTER — HOSPITAL ENCOUNTER (OUTPATIENT)
Dept: GENERAL RADIOLOGY | Age: 85
Discharge: HOME | End: 2020-01-07
Payer: MEDICARE

## 2020-01-01 ENCOUNTER — APPOINTMENT (OUTPATIENT)
Dept: GENERAL RADIOLOGY | Age: 85
DRG: 919 | End: 2020-01-01
Attending: STUDENT IN AN ORGANIZED HEALTH CARE EDUCATION/TRAINING PROGRAM
Payer: MEDICARE

## 2020-01-01 ENCOUNTER — HOSPITAL ENCOUNTER (INPATIENT)
Age: 85
LOS: 1 days | DRG: 919 | End: 2020-02-12
Attending: STUDENT IN AN ORGANIZED HEALTH CARE EDUCATION/TRAINING PROGRAM | Admitting: HOSPITALIST
Payer: MEDICARE

## 2020-01-01 ENCOUNTER — APPOINTMENT (OUTPATIENT)
Dept: GENERAL RADIOLOGY | Age: 85
End: 2020-01-01
Attending: EMERGENCY MEDICINE
Payer: MEDICARE

## 2020-01-01 ENCOUNTER — DOCUMENTATION ONLY (OUTPATIENT)
Dept: ONCOLOGY | Age: 85
End: 2020-01-01

## 2020-01-01 ENCOUNTER — TELEPHONE (OUTPATIENT)
Dept: ONCOLOGY | Age: 85
End: 2020-01-01

## 2020-01-01 ENCOUNTER — TELEPHONE (OUTPATIENT)
Dept: SURGERY | Age: 85
End: 2020-01-01

## 2020-01-01 ENCOUNTER — APPOINTMENT (OUTPATIENT)
Dept: CT IMAGING | Age: 85
End: 2020-01-01
Attending: PHYSICIAN ASSISTANT
Payer: MEDICARE

## 2020-01-01 ENCOUNTER — HOSPITAL ENCOUNTER (EMERGENCY)
Age: 85
Discharge: HOME | DRG: 919 | End: 2020-02-08
Attending: EMERGENCY MEDICINE | Admitting: EMERGENCY MEDICINE
Payer: MEDICARE

## 2020-01-01 ENCOUNTER — HOSPITAL ENCOUNTER (OUTPATIENT)
Dept: INFUSION THERAPY | Age: 85
Discharge: HOME | End: 2020-02-06
Payer: MEDICARE

## 2020-01-01 ENCOUNTER — APPOINTMENT (OUTPATIENT)
Dept: GENERAL RADIOLOGY | Age: 85
DRG: 919 | End: 2020-01-01
Attending: PHYSICIAN ASSISTANT
Payer: MEDICARE

## 2020-01-01 ENCOUNTER — HOSPITAL ENCOUNTER (OUTPATIENT)
Dept: LAB | Age: 85
Discharge: HOME | End: 2020-01-07
Payer: MEDICARE

## 2020-01-01 ENCOUNTER — HOSPITAL ENCOUNTER (OUTPATIENT)
Dept: INTERVENTIONAL RADIOLOGY/VASCULAR | Age: 85
Discharge: HOME | End: 2020-01-21
Attending: INTERNAL MEDICINE | Admitting: STUDENT IN AN ORGANIZED HEALTH CARE EDUCATION/TRAINING PROGRAM
Payer: MEDICARE

## 2020-01-01 ENCOUNTER — HOSPITAL ENCOUNTER (OUTPATIENT)
Dept: CT IMAGING | Age: 85
Discharge: HOME | End: 2020-01-17
Attending: INTERNAL MEDICINE
Payer: MEDICARE

## 2020-01-01 ENCOUNTER — HOSPITAL ENCOUNTER (EMERGENCY)
Age: 85
Discharge: HOME | End: 2020-01-31
Attending: EMERGENCY MEDICINE | Admitting: EMERGENCY MEDICINE
Payer: MEDICARE

## 2020-01-01 VITALS
RESPIRATION RATE: 18 BRPM | BODY MASS INDEX: 22.13 KG/M2 | HEART RATE: 68 BPM | TEMPERATURE: 97.4 F | TEMPERATURE: 97.9 F | OXYGEN SATURATION: 98 % | HEART RATE: 56 BPM | DIASTOLIC BLOOD PRESSURE: 82 MMHG | HEIGHT: 73 IN | SYSTOLIC BLOOD PRESSURE: 158 MMHG | WEIGHT: 167 LBS | WEIGHT: 160 LBS | HEIGHT: 73 IN | SYSTOLIC BLOOD PRESSURE: 164 MMHG | OXYGEN SATURATION: 92 % | DIASTOLIC BLOOD PRESSURE: 81 MMHG | BODY MASS INDEX: 21.2 KG/M2

## 2020-01-01 VITALS
RESPIRATION RATE: 18 BRPM | SYSTOLIC BLOOD PRESSURE: 176 MMHG | DIASTOLIC BLOOD PRESSURE: 84 MMHG | HEART RATE: 91 BPM | OXYGEN SATURATION: 93 % | WEIGHT: 167 LBS | HEIGHT: 73 IN | BODY MASS INDEX: 22.13 KG/M2

## 2020-01-01 VITALS
OXYGEN SATURATION: 98 % | BODY MASS INDEX: 22.13 KG/M2 | DIASTOLIC BLOOD PRESSURE: 78 MMHG | SYSTOLIC BLOOD PRESSURE: 177 MMHG | HEIGHT: 73 IN | WEIGHT: 167 LBS | HEART RATE: 59 BPM | TEMPERATURE: 97.4 F

## 2020-01-01 VITALS
RESPIRATION RATE: 20 BRPM | SYSTOLIC BLOOD PRESSURE: 142 MMHG | OXYGEN SATURATION: 99 % | HEART RATE: 60 BPM | DIASTOLIC BLOOD PRESSURE: 82 MMHG | TEMPERATURE: 97.8 F

## 2020-01-01 VITALS
DIASTOLIC BLOOD PRESSURE: 82 MMHG | BODY MASS INDEX: 22.15 KG/M2 | OXYGEN SATURATION: 94 % | SYSTOLIC BLOOD PRESSURE: 170 MMHG | WEIGHT: 167.1 LBS | HEIGHT: 73 IN | TEMPERATURE: 98.2 F | HEART RATE: 52 BPM

## 2020-01-01 VITALS
TEMPERATURE: 97.5 F | BODY MASS INDEX: 21.2 KG/M2 | SYSTOLIC BLOOD PRESSURE: 169 MMHG | WEIGHT: 160 LBS | RESPIRATION RATE: 18 BRPM | HEIGHT: 73 IN | HEART RATE: 70 BPM | DIASTOLIC BLOOD PRESSURE: 72 MMHG | OXYGEN SATURATION: 100 %

## 2020-01-01 VITALS
RESPIRATION RATE: 18 BRPM | HEIGHT: 73 IN | SYSTOLIC BLOOD PRESSURE: 148 MMHG | WEIGHT: 167 LBS | TEMPERATURE: 97.6 F | OXYGEN SATURATION: 97 % | BODY MASS INDEX: 22.13 KG/M2 | HEART RATE: 63 BPM | DIASTOLIC BLOOD PRESSURE: 78 MMHG

## 2020-01-01 VITALS
OXYGEN SATURATION: 97 % | BODY MASS INDEX: 21.63 KG/M2 | HEART RATE: 47 BPM | WEIGHT: 163.2 LBS | HEIGHT: 73 IN | DIASTOLIC BLOOD PRESSURE: 69 MMHG | SYSTOLIC BLOOD PRESSURE: 145 MMHG

## 2020-01-01 VITALS
BODY MASS INDEX: 22.13 KG/M2 | OXYGEN SATURATION: 92 % | HEART RATE: 62 BPM | RESPIRATION RATE: 18 BRPM | WEIGHT: 167 LBS | HEIGHT: 73 IN | SYSTOLIC BLOOD PRESSURE: 180 MMHG | TEMPERATURE: 98 F | DIASTOLIC BLOOD PRESSURE: 89 MMHG

## 2020-01-01 VITALS
HEART RATE: 64 BPM | BODY MASS INDEX: 22.13 KG/M2 | HEIGHT: 73 IN | RESPIRATION RATE: 21 BRPM | SYSTOLIC BLOOD PRESSURE: 175 MMHG | WEIGHT: 167 LBS | DIASTOLIC BLOOD PRESSURE: 144 MMHG | TEMPERATURE: 97.5 F | OXYGEN SATURATION: 95 %

## 2020-01-01 VITALS
HEIGHT: 73 IN | HEART RATE: 64 BPM | SYSTOLIC BLOOD PRESSURE: 133 MMHG | BODY MASS INDEX: 23.29 KG/M2 | TEMPERATURE: 97.9 F | WEIGHT: 175.71 LBS | DIASTOLIC BLOOD PRESSURE: 55 MMHG | OXYGEN SATURATION: 95 % | RESPIRATION RATE: 20 BRPM

## 2020-01-01 DIAGNOSIS — J90 PLEURAL EFFUSION: ICD-10-CM

## 2020-01-01 DIAGNOSIS — C83.30 DIFFUSE LARGE B-CELL LYMPHOMA, UNSPECIFIED BODY REGION (HCC): ICD-10-CM

## 2020-01-01 DIAGNOSIS — C85.80 MALIGNANT LYMPHOMA, UNDIFFERENTIATED CELL, NON-BURKITT'S (HCC): Primary | ICD-10-CM

## 2020-01-01 DIAGNOSIS — J90 PLEURAL EFFUSION: Primary | ICD-10-CM

## 2020-01-01 DIAGNOSIS — I50.23 ACUTE ON CHRONIC SYSTOLIC CHF (CONGESTIVE HEART FAILURE) (HCC): ICD-10-CM

## 2020-01-01 DIAGNOSIS — R06.02 SOB (SHORTNESS OF BREATH): Primary | ICD-10-CM

## 2020-01-01 DIAGNOSIS — C83.30 DIFFUSE LARGE B-CELL LYMPHOMA, UNSPECIFIED BODY REGION (HCC): Primary | ICD-10-CM

## 2020-01-01 DIAGNOSIS — R06.02 SOB (SHORTNESS OF BREATH): ICD-10-CM

## 2020-01-01 DIAGNOSIS — C85.80 MALIGNANT LYMPHOMA, UNDIFFERENTIATED CELL, NON-BURKITT'S (HCC): ICD-10-CM

## 2020-01-01 DIAGNOSIS — C18.9 MALIGNANT NEOPLASM OF COLON, UNSPECIFIED PART OF COLON (HCC): ICD-10-CM

## 2020-01-01 DIAGNOSIS — C85.12 B-CELL LYMPHOMA OF INTRATHORACIC LYMPH NODES, UNSPECIFIED B-CELL LYMPHOMA TYPE (HCC): ICD-10-CM

## 2020-01-01 DIAGNOSIS — C85.12 B-CELL LYMPHOMA OF INTRATHORACIC LYMPH NODES, UNSPECIFIED B-CELL LYMPHOMA TYPE (HCC): Primary | ICD-10-CM

## 2020-01-01 DIAGNOSIS — R91.8 PULMONARY INFILTRATE: ICD-10-CM

## 2020-01-01 LAB
ALBUMIN SERPL-MCNC: 1.9 G/DL (ref 3.5–5)
ALBUMIN SERPL-MCNC: 2 G/DL (ref 3.5–5)
ALBUMIN SERPL-MCNC: 2.3 G/DL (ref 3.5–5)
ALBUMIN/GLOB SERPL: 0.4 {RATIO} (ref 1.1–2.2)
ALBUMIN/GLOB SERPL: 0.5 {RATIO} (ref 1.1–2.2)
ALBUMIN/GLOB SERPL: 0.5 {RATIO} (ref 1.1–2.2)
ALP SERPL-CCNC: 103 U/L (ref 45–117)
ALP SERPL-CCNC: 108 U/L (ref 45–117)
ALP SERPL-CCNC: 86 U/L (ref 45–117)
ALP SERPL-CCNC: 92 U/L (ref 45–117)
ALP SERPL-CCNC: 98 U/L (ref 45–117)
ALT SERPL-CCNC: 19 U/L (ref 12–78)
ALT SERPL-CCNC: 20 U/L (ref 12–78)
ALT SERPL-CCNC: 20 U/L (ref 12–78)
ALT SERPL-CCNC: 21 U/L (ref 12–78)
ALT SERPL-CCNC: 21 U/L (ref 12–78)
AMMONIA PLAS-SCNC: 24 UMOL/L
ANALYSIS AND GATING STRATEGY: NORMAL
ANION GAP SERPL CALC-SCNC: 2 MMOL/L (ref 5–15)
ANION GAP SERPL CALC-SCNC: 2 MMOL/L (ref 5–15)
ANION GAP SERPL CALC-SCNC: 3 MMOL/L (ref 5–15)
ANION GAP SERPL CALC-SCNC: 3 MMOL/L (ref 5–15)
ANION GAP SERPL CALC-SCNC: ABNORMAL MMOL/L (ref 5–15)
ANNOTATION COMMENT IMP: NORMAL
APPEARANCE FLD: ABNORMAL
APPEARANCE UR: CLEAR
ARTERIAL PATENCY WRIST A: YES
ASSESSMENT OF LEUKOCYTES: NORMAL
AST SERPL-CCNC: 17 U/L (ref 15–37)
AST SERPL-CCNC: 18 U/L (ref 15–37)
AST SERPL-CCNC: 22 U/L (ref 15–37)
ATRIAL RATE: 163 BPM
ATRIAL RATE: 47 BPM
ATRIAL RATE: 75 BPM
BACTERIA URNS QL MICRO: NEGATIVE /HPF
BASE EXCESS BLD CALC-SCNC: 6 MMOL/L
BASOPHILS # BLD: 0 K/UL (ref 0–0.1)
BASOPHILS NFR BLD: 0 % (ref 0–1)
BASOPHILS NFR BLD: 1 % (ref 0–1)
BDY SITE: ABNORMAL
BILIRUB SERPL-MCNC: 0.2 MG/DL (ref 0.2–1)
BILIRUB SERPL-MCNC: 0.3 MG/DL (ref 0.2–1)
BILIRUB UR QL: NEGATIVE
BUN SERPL-MCNC: 36 MG/DL (ref 6–20)
BUN SERPL-MCNC: 37 MG/DL (ref 6–20)
BUN SERPL-MCNC: 37 MG/DL (ref 6–20)
BUN SERPL-MCNC: 39 MG/DL (ref 6–20)
BUN SERPL-MCNC: 52 MG/DL (ref 6–20)
BUN/CREAT SERPL: 24 (ref 12–20)
BUN/CREAT SERPL: 26 (ref 12–20)
BUN/CREAT SERPL: 26 (ref 12–20)
BUN/CREAT SERPL: 28 (ref 12–20)
BUN/CREAT SERPL: 30 (ref 12–20)
CA-I BLD-SCNC: 1.29 MMOL/L (ref 1.12–1.32)
CALCIUM SERPL-MCNC: 8.2 MG/DL (ref 8.5–10.1)
CALCIUM SERPL-MCNC: 8.5 MG/DL (ref 8.5–10.1)
CALCIUM SERPL-MCNC: 8.7 MG/DL (ref 8.5–10.1)
CALCIUM SERPL-MCNC: 8.9 MG/DL (ref 8.5–10.1)
CALCIUM SERPL-MCNC: 8.9 MG/DL (ref 8.5–10.1)
CALCULATED R AXIS, ECG10: -54 DEGREES
CALCULATED R AXIS, ECG10: -54 DEGREES
CALCULATED R AXIS, ECG10: -59 DEGREES
CALCULATED T AXIS, ECG11: 53 DEGREES
CALCULATED T AXIS, ECG11: 63 DEGREES
CALCULATED T AXIS, ECG11: 86 DEGREES
CELLS ANALYZED: NORMAL
CELLS COUNTED: NORMAL
CHLORIDE SERPL-SCNC: 103 MMOL/L (ref 97–108)
CHLORIDE SERPL-SCNC: 105 MMOL/L (ref 97–108)
CHLORIDE SERPL-SCNC: 106 MMOL/L (ref 97–108)
CHLORIDE SERPL-SCNC: 107 MMOL/L (ref 97–108)
CHLORIDE SERPL-SCNC: 108 MMOL/L (ref 97–108)
CLINICAL CYTOGENETICIST SPEC: NORMAL
CLINICAL INFO: NORMAL
CO2 SERPL-SCNC: 30 MMOL/L (ref 21–32)
CO2 SERPL-SCNC: 30 MMOL/L (ref 21–32)
CO2 SERPL-SCNC: 31 MMOL/L (ref 21–32)
CO2 SERPL-SCNC: 32 MMOL/L (ref 21–32)
CO2 SERPL-SCNC: 33 MMOL/L (ref 21–32)
COLOR FLD: YELLOW
COLOR UR: ABNORMAL
COMMENT , 490046: NORMAL
COMMENT, HOLDF: NORMAL
CREAT SERPL-MCNC: 1.41 MG/DL (ref 0.7–1.3)
CREAT SERPL-MCNC: 1.41 MG/DL (ref 0.7–1.3)
CREAT SERPL-MCNC: 1.42 MG/DL (ref 0.7–1.3)
CREAT SERPL-MCNC: 1.47 MG/DL (ref 0.7–1.3)
CREAT SERPL-MCNC: 1.73 MG/DL (ref 0.7–1.3)
DIAGNOSIS, 93000: NORMAL
DIFFERENTIAL METHOD BLD: ABNORMAL
EOSINOPHIL # BLD: 0.1 K/UL (ref 0–0.4)
EOSINOPHIL NFR BLD: 1 % (ref 0–7)
EOSINOPHIL NFR BLD: 3 % (ref 0–7)
EPITH CASTS URNS QL MICRO: ABNORMAL /LPF
ERYTHROCYTE [DISTWIDTH] IN BLOOD BY AUTOMATED COUNT: 14.9 % (ref 11.5–14.5)
ERYTHROCYTE [DISTWIDTH] IN BLOOD BY AUTOMATED COUNT: 15.2 % (ref 11.5–14.5)
ERYTHROCYTE [DISTWIDTH] IN BLOOD BY AUTOMATED COUNT: 15.3 % (ref 11.5–14.5)
ERYTHROCYTE [DISTWIDTH] IN BLOOD BY AUTOMATED COUNT: 15.5 % (ref 11.5–14.5)
ERYTHROCYTE [DISTWIDTH] IN BLOOD BY AUTOMATED COUNT: 15.5 % (ref 11.5–14.5)
ERYTHROCYTE [DISTWIDTH] IN BLOOD BY AUTOMATED COUNT: 15.6 % (ref 11.5–14.5)
GAS FLOW.O2 O2 DELIVERY SYS: ABNORMAL L/MIN
GAS FLOW.O2 SETTING OXYMISER: 3 L/M
GENETIC DISEASES BLD/T FISH: NORMAL
GLOBULIN SER CALC-MCNC: 3.9 G/DL (ref 2–4)
GLOBULIN SER CALC-MCNC: 4.6 G/DL (ref 2–4)
GLOBULIN SER CALC-MCNC: 4.7 G/DL (ref 2–4)
GLOBULIN SER CALC-MCNC: 4.7 G/DL (ref 2–4)
GLOBULIN SER CALC-MCNC: 4.8 G/DL (ref 2–4)
GLUCOSE BLD STRIP.AUTO-MCNC: 139 MG/DL (ref 65–100)
GLUCOSE BLD STRIP.AUTO-MCNC: 219 MG/DL (ref 65–100)
GLUCOSE SERPL-MCNC: 139 MG/DL (ref 65–100)
GLUCOSE SERPL-MCNC: 151 MG/DL (ref 65–100)
GLUCOSE SERPL-MCNC: 163 MG/DL (ref 65–100)
GLUCOSE SERPL-MCNC: 181 MG/DL (ref 65–100)
GLUCOSE SERPL-MCNC: 248 MG/DL (ref 65–100)
GLUCOSE UR STRIP.AUTO-MCNC: NEGATIVE MG/DL
HAV IGM SER QL: NONREACTIVE
HBV CORE IGM SER QL: NONREACTIVE
HBV SURFACE AG SER QL: <0.1 INDEX
HBV SURFACE AG SER QL: NEGATIVE
HCO3 BLD-SCNC: 32 MMOL/L (ref 22–26)
HCT VFR BLD AUTO: 29.8 % (ref 36.6–50.3)
HCT VFR BLD AUTO: 29.9 % (ref 36.6–50.3)
HCT VFR BLD AUTO: 30.2 % (ref 36.6–50.3)
HCT VFR BLD AUTO: 30.5 % (ref 36.6–50.3)
HCT VFR BLD AUTO: 32.3 % (ref 36.6–50.3)
HCT VFR BLD AUTO: 33.1 % (ref 36.6–50.3)
HCV AB SER IA-ACNC: 1.46 INDEX
HCV AB SERPL QL IA: REACTIVE
HCV COMMENT,HCGAC: ABNORMAL
HCV GENOTYPE: NORMAL
HCV RNA SERPL NAA+PROBE-ACNC: NORMAL IU/ML
HCV RNA SERPL NAA+PROBE-LOG IU: NORMAL LOG10 IU/ML
HGB BLD-MCNC: 8.8 G/DL (ref 12.1–17)
HGB BLD-MCNC: 8.9 G/DL (ref 12.1–17)
HGB BLD-MCNC: 9.1 G/DL (ref 12.1–17)
HGB BLD-MCNC: 9.1 G/DL (ref 12.1–17)
HGB BLD-MCNC: 9.7 G/DL (ref 12.1–17)
HGB BLD-MCNC: 9.7 G/DL (ref 12.1–17)
HGB UR QL STRIP: NEGATIVE
HYALINE CASTS URNS QL MICRO: ABNORMAL /LPF (ref 0–5)
IMM GRANULOCYTES # BLD AUTO: 0 K/UL (ref 0–0.04)
IMM GRANULOCYTES # BLD AUTO: 0.1 K/UL (ref 0–0.04)
IMM GRANULOCYTES NFR BLD AUTO: 1 % (ref 0–0.5)
IMMUNOPHENOTYPING STUDY: NORMAL
KETONES UR QL STRIP.AUTO: NEGATIVE MG/DL
LEUKOCYTE ESTERASE UR QL STRIP.AUTO: NEGATIVE
LYMPHOCYTES # BLD: 0.3 K/UL (ref 0.8–3.5)
LYMPHOCYTES # BLD: 0.3 K/UL (ref 0.8–3.5)
LYMPHOCYTES # BLD: 0.4 K/UL (ref 0.8–3.5)
LYMPHOCYTES # BLD: 0.4 K/UL (ref 0.8–3.5)
LYMPHOCYTES # BLD: 0.5 K/UL (ref 0.8–3.5)
LYMPHOCYTES # BLD: 0.5 K/UL (ref 0.8–3.5)
LYMPHOCYTES NFR BLD: 13 % (ref 12–49)
LYMPHOCYTES NFR BLD: 3 % (ref 12–49)
LYMPHOCYTES NFR BLD: 3 % (ref 12–49)
LYMPHOCYTES NFR BLD: 4 % (ref 12–49)
LYMPHOCYTES NFR FLD: 6 %
MCH RBC QN AUTO: 29 PG (ref 26–34)
MCH RBC QN AUTO: 29 PG (ref 26–34)
MCH RBC QN AUTO: 29.2 PG (ref 26–34)
MCH RBC QN AUTO: 29.5 PG (ref 26–34)
MCH RBC QN AUTO: 29.9 PG (ref 26–34)
MCH RBC QN AUTO: 31 PG (ref 26–34)
MCHC RBC AUTO-ENTMCNC: 29.2 G/DL (ref 30–36.5)
MCHC RBC AUTO-ENTMCNC: 29.3 G/DL (ref 30–36.5)
MCHC RBC AUTO-ENTMCNC: 29.4 G/DL (ref 30–36.5)
MCHC RBC AUTO-ENTMCNC: 30 G/DL (ref 30–36.5)
MCHC RBC AUTO-ENTMCNC: 30.1 G/DL (ref 30–36.5)
MCHC RBC AUTO-ENTMCNC: 30.5 G/DL (ref 30–36.5)
MCV RBC AUTO: 102.7 FL (ref 80–99)
MCV RBC AUTO: 96.8 FL (ref 80–99)
MCV RBC AUTO: 98.7 FL (ref 80–99)
MCV RBC AUTO: 99.3 FL (ref 80–99)
MCV RBC AUTO: 99.7 FL (ref 80–99)
MCV RBC AUTO: 99.7 FL (ref 80–99)
MONOCYTES # BLD: 0.3 K/UL (ref 0–1)
MONOCYTES # BLD: 0.5 K/UL (ref 0–1)
MONOCYTES # BLD: 0.7 K/UL (ref 0–1)
MONOCYTES NFR BLD: 5 % (ref 5–13)
MONOCYTES NFR BLD: 8 % (ref 5–13)
MONOS+MACROS NFR FLD: 24 %
NARRATIVE DIAGNOSTIC REPORT-IMP: NORMAL
NEUTROPHILS NFR FLD: 70 %
NEUTS SEG # BLD: 11.8 K/UL (ref 1.8–8)
NEUTS SEG # BLD: 3 K/UL (ref 1.8–8)
NEUTS SEG # BLD: 8.4 K/UL (ref 1.8–8)
NEUTS SEG # BLD: 8.6 K/UL (ref 1.8–8)
NEUTS SEG # BLD: 9.1 K/UL (ref 1.8–8)
NEUTS SEG # BLD: 9.1 K/UL (ref 1.8–8)
NEUTS SEG NFR BLD: 74 % (ref 32–75)
NEUTS SEG NFR BLD: 89 % (ref 32–75)
NEUTS SEG NFR BLD: 90 % (ref 32–75)
NEUTS SEG NFR BLD: 90 % (ref 32–75)
NITRITE UR QL STRIP.AUTO: NEGATIVE
NRBC # BLD: 0 K/UL (ref 0–0.01)
NRBC BLD-RTO: 0 PER 100 WBC
NUC CELL # FLD: ABNORMAL /CU MM
PATH INTERP SPEC-IMP: NORMAL
PATHOLOGIST NAME: NORMAL
PCO2 BLD: 63.7 MMHG (ref 35–45)
PH BLD: 7.31 [PH] (ref 7.35–7.45)
PH UR STRIP: 5 [PH] (ref 5–8)
PLATELET # BLD AUTO: 143 K/UL (ref 150–400)
PLATELET # BLD AUTO: 208 K/UL (ref 150–400)
PLATELET # BLD AUTO: 221 K/UL (ref 150–400)
PLATELET # BLD AUTO: 259 K/UL (ref 150–400)
PLATELET # BLD AUTO: 304 K/UL (ref 150–400)
PLATELET # BLD AUTO: 308 K/UL (ref 150–400)
PMV BLD AUTO: 8.8 FL (ref 8.9–12.9)
PMV BLD AUTO: 8.9 FL (ref 8.9–12.9)
PMV BLD AUTO: 9 FL (ref 8.9–12.9)
PMV BLD AUTO: 9 FL (ref 8.9–12.9)
PMV BLD AUTO: 9.2 FL (ref 8.9–12.9)
PMV BLD AUTO: 9.4 FL (ref 8.9–12.9)
PO2 BLD: 88 MMHG (ref 80–100)
POTASSIUM SERPL-SCNC: 4.2 MMOL/L (ref 3.5–5.1)
POTASSIUM SERPL-SCNC: 4.9 MMOL/L (ref 3.5–5.1)
POTASSIUM SERPL-SCNC: 5.2 MMOL/L (ref 3.5–5.1)
PROT SERPL-MCNC: 5.9 G/DL (ref 6.4–8.2)
PROT SERPL-MCNC: 6.5 G/DL (ref 6.4–8.2)
PROT SERPL-MCNC: 6.6 G/DL (ref 6.4–8.2)
PROT SERPL-MCNC: 6.7 G/DL (ref 6.4–8.2)
PROT SERPL-MCNC: 7 G/DL (ref 6.4–8.2)
PROT UR STRIP-MCNC: 30 MG/DL
Q-T INTERVAL, ECG07: 424 MS
Q-T INTERVAL, ECG07: 444 MS
Q-T INTERVAL, ECG07: 446 MS
QRS DURATION, ECG06: 72 MS
QRS DURATION, ECG06: 74 MS
QRS DURATION, ECG06: 86 MS
QTC CALCULATION (BEZET), ECG08: 430 MS
QTC CALCULATION (BEZET), ECG08: 460 MS
QTC CALCULATION (BEZET), ECG08: 479 MS
RBC # BLD AUTO: 2.94 M/UL (ref 4.1–5.7)
RBC # BLD AUTO: 3.03 M/UL (ref 4.1–5.7)
RBC # BLD AUTO: 3.07 M/UL (ref 4.1–5.7)
RBC # BLD AUTO: 3.08 M/UL (ref 4.1–5.7)
RBC # BLD AUTO: 3.24 M/UL (ref 4.1–5.7)
RBC # BLD AUTO: 3.32 M/UL (ref 4.1–5.7)
RBC # FLD: >100 /CU MM
RBC #/AREA URNS HPF: ABNORMAL /HPF (ref 0–5)
RBC MORPH BLD: ABNORMAL
SAMPLES BEING HELD,HOLD: NORMAL
SAO2 % BLD: 95 % (ref 92–97)
SERVICE CMNT-IMP: ABNORMAL
SERVICE CMNT-IMP: ABNORMAL
SODIUM SERPL-SCNC: 138 MMOL/L (ref 136–145)
SODIUM SERPL-SCNC: 140 MMOL/L (ref 136–145)
SODIUM SERPL-SCNC: 140 MMOL/L (ref 136–145)
SP GR UR REFRACTOMETRY: 1.02 (ref 1–1.03)
SP1: ABNORMAL
SP2: ABNORMAL
SP3: ABNORMAL
SPECIMEN SOURCE FLD: ABNORMAL
SPECIMEN SOURCE: NORMAL
SPECIMEN SOURCE: NORMAL
SPECIMEN TYPE: ABNORMAL
TEST INFORMATION, 550045: NORMAL
TOTAL RESP. RATE, ITRR: 21
TROPONIN I SERPL-MCNC: <0.05 NG/ML
TROPONIN I SERPL-MCNC: <0.05 NG/ML
UR CULT HOLD, URHOLD: NORMAL
UROBILINOGEN UR QL STRIP.AUTO: 0.2 EU/DL (ref 0.2–1)
VENTRICULAR RATE, ECG03: 62 BPM
VENTRICULAR RATE, ECG03: 64 BPM
VENTRICULAR RATE, ECG03: 70 BPM
VIABLE CELLS NFR SPEC: NORMAL %
WBC # BLD AUTO: 10.1 K/UL (ref 4.1–11.1)
WBC # BLD AUTO: 10.2 K/UL (ref 4.1–11.1)
WBC # BLD AUTO: 13.2 K/UL (ref 4.1–11.1)
WBC # BLD AUTO: 3.9 K/UL (ref 4.1–11.1)
WBC # BLD AUTO: 9.5 K/UL (ref 4.1–11.1)
WBC # BLD AUTO: 9.6 K/UL (ref 4.1–11.1)
WBC URNS QL MICRO: ABNORMAL /HPF (ref 0–4)

## 2020-01-01 PROCEDURE — 74011250636 HC RX REV CODE- 250/636: Performed by: INTERNAL MEDICINE

## 2020-01-01 PROCEDURE — 82803 BLOOD GASES ANY COMBINATION: CPT

## 2020-01-01 PROCEDURE — 84484 ASSAY OF TROPONIN QUANT: CPT

## 2020-01-01 PROCEDURE — 74011250637 HC RX REV CODE- 250/637: Performed by: HOSPITALIST

## 2020-01-01 PROCEDURE — 99285 EMERGENCY DEPT VISIT HI MDM: CPT

## 2020-01-01 PROCEDURE — 88275 CYTOGENETICS 100-300: CPT

## 2020-01-01 PROCEDURE — 99218 HC RM OBSERVATION: CPT

## 2020-01-01 PROCEDURE — 71045 X-RAY EXAM CHEST 1 VIEW: CPT

## 2020-01-01 PROCEDURE — 80053 COMPREHEN METABOLIC PANEL: CPT

## 2020-01-01 PROCEDURE — 74011250636 HC RX REV CODE- 250/636: Performed by: HOSPITALIST

## 2020-01-01 PROCEDURE — 74011250636 HC RX REV CODE- 250/636: Performed by: EMERGENCY MEDICINE

## 2020-01-01 PROCEDURE — 71250 CT THORAX DX C-: CPT

## 2020-01-01 PROCEDURE — 88311 DECALCIFY TISSUE: CPT

## 2020-01-01 PROCEDURE — 3E0L3GC INTRODUCTION OF OTHER THERAPEUTIC SUBSTANCE INTO PLEURAL CAVITY, PERCUTANEOUS APPROACH: ICD-10-PCS | Performed by: HOSPITALIST

## 2020-01-01 PROCEDURE — 88184 FLOWCYTOMETRY/ TC 1 MARKER: CPT

## 2020-01-01 PROCEDURE — 36593 DECLOT VASCULAR DEVICE: CPT

## 2020-01-01 PROCEDURE — 36415 COLL VENOUS BLD VENIPUNCTURE: CPT

## 2020-01-01 PROCEDURE — 85025 COMPLETE CBC W/AUTO DIFF WBC: CPT

## 2020-01-01 PROCEDURE — 88313 SPECIAL STAINS GROUP 2: CPT

## 2020-01-01 PROCEDURE — 93005 ELECTROCARDIOGRAM TRACING: CPT

## 2020-01-01 PROCEDURE — 71046 X-RAY EXAM CHEST 2 VIEWS: CPT

## 2020-01-01 PROCEDURE — 82962 GLUCOSE BLOOD TEST: CPT

## 2020-01-01 PROCEDURE — 94762 N-INVAS EAR/PLS OXIMTRY CONT: CPT

## 2020-01-01 PROCEDURE — 80074 ACUTE HEPATITIS PANEL: CPT

## 2020-01-01 PROCEDURE — 74011250637 HC RX REV CODE- 250/637: Performed by: INTERNAL MEDICINE

## 2020-01-01 PROCEDURE — 88271 CYTOGENETICS DNA PROBE: CPT

## 2020-01-01 PROCEDURE — 70450 CT HEAD/BRAIN W/O DYE: CPT

## 2020-01-01 PROCEDURE — 51701 INSERT BLADDER CATHETER: CPT

## 2020-01-01 PROCEDURE — 74011250636 HC RX REV CODE- 250/636: Performed by: NURSE PRACTITIONER

## 2020-01-01 PROCEDURE — 88305 TISSUE EXAM BY PATHOLOGIST: CPT

## 2020-01-01 PROCEDURE — 88342 IMHCHEM/IMCYTCHM 1ST ANTB: CPT

## 2020-01-01 PROCEDURE — 88112 CYTOPATH CELL ENHANCE TECH: CPT

## 2020-01-01 PROCEDURE — 88237 TISSUE CULTURE BONE MARROW: CPT

## 2020-01-01 PROCEDURE — 96415 CHEMO IV INFUSION ADDL HR: CPT

## 2020-01-01 PROCEDURE — 77030014115

## 2020-01-01 PROCEDURE — 38221 DX BONE MARROW BIOPSIES: CPT

## 2020-01-01 PROCEDURE — 74011250636 HC RX REV CODE- 250/636: Performed by: PHYSICIAN ASSISTANT

## 2020-01-01 PROCEDURE — 82140 ASSAY OF AMMONIA: CPT

## 2020-01-01 PROCEDURE — 65660000000 HC RM CCU STEPDOWN

## 2020-01-01 PROCEDURE — 74011000258 HC RX REV CODE- 258: Performed by: NURSE PRACTITIONER

## 2020-01-01 PROCEDURE — 74011250636 HC RX REV CODE- 250/636: Performed by: RADIOLOGY

## 2020-01-01 PROCEDURE — 88185 FLOWCYTOMETRY/TC ADD-ON: CPT

## 2020-01-01 PROCEDURE — 74011000250 HC RX REV CODE- 250: Performed by: NURSE PRACTITIONER

## 2020-01-01 PROCEDURE — 74011000250 HC RX REV CODE- 250: Performed by: HOSPITALIST

## 2020-01-01 PROCEDURE — 81001 URINALYSIS AUTO W/SCOPE: CPT

## 2020-01-01 PROCEDURE — 74011000250 HC RX REV CODE- 250: Performed by: PHYSICIAN ASSISTANT

## 2020-01-01 PROCEDURE — 87522 HEPATITIS C REVRS TRNSCRPJ: CPT

## 2020-01-01 PROCEDURE — 89050 BODY FLUID CELL COUNT: CPT

## 2020-01-01 PROCEDURE — 36600 WITHDRAWAL OF ARTERIAL BLOOD: CPT

## 2020-01-01 PROCEDURE — 77030003666 HC NDL SPINAL BD -A

## 2020-01-01 PROCEDURE — 77030028872 HC BN BIOP NDL ON CNTRL TY TELE -C

## 2020-01-01 PROCEDURE — 96413 CHEMO IV INFUSION 1 HR: CPT

## 2020-01-01 PROCEDURE — 88341 IMHCHEM/IMCYTCHM EA ADD ANTB: CPT

## 2020-01-01 RX ORDER — SODIUM CHLORIDE 9 MG/ML
10 INJECTION INTRAMUSCULAR; INTRAVENOUS; SUBCUTANEOUS AS NEEDED
Status: CANCELLED | OUTPATIENT
Start: 2020-02-13

## 2020-01-01 RX ORDER — ONDANSETRON 2 MG/ML
8 INJECTION INTRAMUSCULAR; INTRAVENOUS AS NEEDED
Status: CANCELLED | OUTPATIENT
Start: 2020-02-27

## 2020-01-01 RX ORDER — SODIUM CHLORIDE 0.9 % (FLUSH) 0.9 %
10 SYRINGE (ML) INJECTION AS NEEDED
Status: CANCELLED
Start: 2020-02-27

## 2020-01-01 RX ORDER — DIPHENHYDRAMINE HYDROCHLORIDE 50 MG/ML
50 INJECTION, SOLUTION INTRAMUSCULAR; INTRAVENOUS AS NEEDED
Status: CANCELLED
Start: 2020-02-27

## 2020-01-01 RX ORDER — LEVOFLOXACIN 250 MG/1
250 TABLET ORAL DAILY
Status: DISCONTINUED | OUTPATIENT
Start: 2020-01-01 | End: 2020-01-01 | Stop reason: HOSPADM

## 2020-01-01 RX ORDER — ACETAMINOPHEN 325 MG/1
650 TABLET ORAL ONCE
Status: COMPLETED | OUTPATIENT
Start: 2020-01-01 | End: 2020-01-01

## 2020-01-01 RX ORDER — SODIUM CHLORIDE 9 MG/ML
25 INJECTION, SOLUTION INTRAVENOUS CONTINUOUS
Status: CANCELLED | OUTPATIENT
Start: 2020-01-01

## 2020-01-01 RX ORDER — DIPHENHYDRAMINE HYDROCHLORIDE 50 MG/ML
50 INJECTION, SOLUTION INTRAMUSCULAR; INTRAVENOUS ONCE
Status: CANCELLED
Start: 2020-01-01

## 2020-01-01 RX ORDER — ALBUTEROL SULFATE 0.83 MG/ML
2.5 SOLUTION RESPIRATORY (INHALATION) AS NEEDED
Status: CANCELLED
Start: 2020-01-01

## 2020-01-01 RX ORDER — DIPHENHYDRAMINE HYDROCHLORIDE 50 MG/ML
50 INJECTION, SOLUTION INTRAMUSCULAR; INTRAVENOUS AS NEEDED
Status: CANCELLED
Start: 2020-02-20

## 2020-01-01 RX ORDER — SODIUM CHLORIDE 9 MG/ML
10 INJECTION INTRAMUSCULAR; INTRAVENOUS; SUBCUTANEOUS AS NEEDED
Status: CANCELLED | OUTPATIENT
Start: 2020-01-01

## 2020-01-01 RX ORDER — SODIUM CHLORIDE 9 MG/ML
25 INJECTION, SOLUTION INTRAVENOUS CONTINUOUS
Status: CANCELLED | OUTPATIENT
Start: 2020-02-27

## 2020-01-01 RX ORDER — SODIUM CHLORIDE 9 MG/ML
25 INJECTION, SOLUTION INTRAVENOUS CONTINUOUS
Status: DISPENSED | OUTPATIENT
Start: 2020-01-01 | End: 2020-01-01

## 2020-01-01 RX ORDER — ACETAMINOPHEN 325 MG/1
650 TABLET ORAL AS NEEDED
Status: CANCELLED
Start: 2020-02-27

## 2020-01-01 RX ORDER — ACETAMINOPHEN 325 MG/1
650 TABLET ORAL AS NEEDED
Status: CANCELLED
Start: 2020-02-20

## 2020-01-01 RX ORDER — EPINEPHRINE 1 MG/ML
0.3 INJECTION, SOLUTION, CONCENTRATE INTRAVENOUS AS NEEDED
Status: CANCELLED | OUTPATIENT
Start: 2020-01-01

## 2020-01-01 RX ORDER — SODIUM BICARBONATE 1 MEQ/ML
SYRINGE (ML) INTRAVENOUS
Status: COMPLETED | OUTPATIENT
Start: 2020-01-01 | End: 2020-01-01

## 2020-01-01 RX ORDER — SODIUM CHLORIDE 9 MG/ML
25 INJECTION, SOLUTION INTRAVENOUS CONTINUOUS
Status: DISCONTINUED | OUTPATIENT
Start: 2020-01-01 | End: 2020-01-01

## 2020-01-01 RX ORDER — SODIUM CHLORIDE 0.9 % (FLUSH) 0.9 %
10 SYRINGE (ML) INJECTION AS NEEDED
Status: CANCELLED
Start: 2020-01-01

## 2020-01-01 RX ORDER — SODIUM CHLORIDE 0.9 % (FLUSH) 0.9 %
10 SYRINGE (ML) INJECTION AS NEEDED
Status: CANCELLED
Start: 2020-02-20

## 2020-01-01 RX ORDER — VERAPAMIL HYDROCHLORIDE 180 MG/1
180 TABLET, EXTENDED RELEASE ORAL
COMMUNITY
Start: 2020-01-01 | End: 2020-01-01 | Stop reason: CLARIF

## 2020-01-01 RX ORDER — EPINEPHRINE 0.1 MG/ML
INJECTION INTRACARDIAC; INTRAVENOUS
Status: COMPLETED | OUTPATIENT
Start: 2020-01-01 | End: 2020-01-01

## 2020-01-01 RX ORDER — DIPHENHYDRAMINE HYDROCHLORIDE 50 MG/ML
25 INJECTION, SOLUTION INTRAMUSCULAR; INTRAVENOUS AS NEEDED
Status: CANCELLED
Start: 2020-02-27

## 2020-01-01 RX ORDER — SODIUM CHLORIDE 0.9 % (FLUSH) 0.9 %
5-40 SYRINGE (ML) INJECTION EVERY 8 HOURS
Status: DISCONTINUED | OUTPATIENT
Start: 2020-01-01 | End: 2020-01-01 | Stop reason: HOSPADM

## 2020-01-01 RX ORDER — ACETAMINOPHEN 325 MG/1
650 TABLET ORAL ONCE
Status: CANCELLED
Start: 2020-02-27

## 2020-01-01 RX ORDER — HYDROCORTISONE SODIUM SUCCINATE 100 MG/2ML
100 INJECTION, POWDER, FOR SOLUTION INTRAMUSCULAR; INTRAVENOUS AS NEEDED
Status: CANCELLED | OUTPATIENT
Start: 2020-02-20

## 2020-01-01 RX ORDER — DOXAZOSIN 1 MG/1
1 TABLET ORAL DAILY
Status: DISCONTINUED | OUTPATIENT
Start: 2020-01-01 | End: 2020-01-01 | Stop reason: HOSPADM

## 2020-01-01 RX ORDER — SODIUM CHLORIDE 9 MG/ML
10 INJECTION INTRAMUSCULAR; INTRAVENOUS; SUBCUTANEOUS AS NEEDED
Status: CANCELLED | OUTPATIENT
Start: 2020-02-20

## 2020-01-01 RX ORDER — SODIUM CHLORIDE 9 MG/ML
25 INJECTION, SOLUTION INTRAVENOUS CONTINUOUS
Status: CANCELLED | OUTPATIENT
Start: 2020-02-13

## 2020-01-01 RX ORDER — EPINEPHRINE 1 MG/ML
0.3 INJECTION, SOLUTION, CONCENTRATE INTRAVENOUS AS NEEDED
Status: CANCELLED | OUTPATIENT
Start: 2020-02-20

## 2020-01-01 RX ORDER — SODIUM CHLORIDE 9 MG/ML
10 INJECTION INTRAMUSCULAR; INTRAVENOUS; SUBCUTANEOUS AS NEEDED
Status: CANCELLED | OUTPATIENT
Start: 2020-02-27

## 2020-01-01 RX ORDER — DIPHENHYDRAMINE HYDROCHLORIDE 50 MG/ML
50 INJECTION, SOLUTION INTRAMUSCULAR; INTRAVENOUS ONCE
Status: CANCELLED
Start: 2020-02-20

## 2020-01-01 RX ORDER — ALBUTEROL SULFATE 0.83 MG/ML
2.5 SOLUTION RESPIRATORY (INHALATION) AS NEEDED
Status: CANCELLED
Start: 2020-02-20

## 2020-01-01 RX ORDER — EPINEPHRINE 1 MG/ML
0.3 INJECTION, SOLUTION, CONCENTRATE INTRAVENOUS AS NEEDED
Status: CANCELLED | OUTPATIENT
Start: 2020-02-27

## 2020-01-01 RX ORDER — EPINEPHRINE 1 MG/ML
0.3 INJECTION, SOLUTION, CONCENTRATE INTRAVENOUS AS NEEDED
Status: CANCELLED | OUTPATIENT
Start: 2020-02-13

## 2020-01-01 RX ORDER — HYDROCORTISONE SODIUM SUCCINATE 100 MG/2ML
100 INJECTION, POWDER, FOR SOLUTION INTRAMUSCULAR; INTRAVENOUS AS NEEDED
Status: CANCELLED | OUTPATIENT
Start: 2020-01-01

## 2020-01-01 RX ORDER — ACETAMINOPHEN 325 MG/1
650 TABLET ORAL
COMMUNITY

## 2020-01-01 RX ORDER — MIDAZOLAM HYDROCHLORIDE 1 MG/ML
5 INJECTION, SOLUTION INTRAMUSCULAR; INTRAVENOUS
Status: DISCONTINUED | OUTPATIENT
Start: 2020-01-01 | End: 2020-01-01

## 2020-01-01 RX ORDER — ONDANSETRON 2 MG/ML
8 INJECTION INTRAMUSCULAR; INTRAVENOUS AS NEEDED
Status: CANCELLED | OUTPATIENT
Start: 2020-02-13

## 2020-01-01 RX ORDER — DIPHENHYDRAMINE HYDROCHLORIDE 50 MG/ML
50 INJECTION, SOLUTION INTRAMUSCULAR; INTRAVENOUS ONCE
Status: CANCELLED
Start: 2020-02-13

## 2020-01-01 RX ORDER — HYDRALAZINE HYDROCHLORIDE 25 MG/1
25 TABLET, FILM COATED ORAL 3 TIMES DAILY
Status: DISCONTINUED | OUTPATIENT
Start: 2020-01-01 | End: 2020-01-01 | Stop reason: HOSPADM

## 2020-01-01 RX ORDER — ACETAMINOPHEN 325 MG/1
650 TABLET ORAL ONCE
Status: CANCELLED
Start: 2020-02-13

## 2020-01-01 RX ORDER — ACETAMINOPHEN 325 MG/1
650 TABLET ORAL
Status: DISCONTINUED | OUTPATIENT
Start: 2020-01-01 | End: 2020-01-01 | Stop reason: HOSPADM

## 2020-01-01 RX ORDER — HEPARIN 100 UNIT/ML
300-500 SYRINGE INTRAVENOUS AS NEEDED
Status: CANCELLED
Start: 2020-01-01

## 2020-01-01 RX ORDER — HEPARIN 100 UNIT/ML
300-500 SYRINGE INTRAVENOUS AS NEEDED
Status: CANCELLED
Start: 2020-02-13

## 2020-01-01 RX ORDER — SODIUM CHLORIDE 9 MG/ML
25 INJECTION, SOLUTION INTRAVENOUS CONTINUOUS
Status: CANCELLED | OUTPATIENT
Start: 2020-02-20

## 2020-01-01 RX ORDER — DIPHENHYDRAMINE HYDROCHLORIDE 50 MG/ML
25 INJECTION, SOLUTION INTRAMUSCULAR; INTRAVENOUS AS NEEDED
Status: CANCELLED
Start: 2020-02-13

## 2020-01-01 RX ORDER — SODIUM CHLORIDE 0.9 % (FLUSH) 0.9 %
10 SYRINGE (ML) INJECTION AS NEEDED
Status: CANCELLED
Start: 2020-02-13

## 2020-01-01 RX ORDER — HEPARIN 100 UNIT/ML
300-500 SYRINGE INTRAVENOUS AS NEEDED
Status: CANCELLED
Start: 2020-02-27

## 2020-01-01 RX ORDER — HYDROCORTISONE SODIUM SUCCINATE 100 MG/2ML
100 INJECTION, POWDER, FOR SOLUTION INTRAMUSCULAR; INTRAVENOUS AS NEEDED
Status: CANCELLED | OUTPATIENT
Start: 2020-02-27

## 2020-01-01 RX ORDER — ACETAMINOPHEN 325 MG/1
650 TABLET ORAL ONCE
Status: CANCELLED
Start: 2020-01-01

## 2020-01-01 RX ORDER — LEVOFLOXACIN 500 MG/1
500 TABLET, FILM COATED ORAL DAILY
Qty: 7 TAB | Refills: 0 | Status: SHIPPED | OUTPATIENT
Start: 2020-01-01 | End: 2020-01-01

## 2020-01-01 RX ORDER — DIPHENHYDRAMINE HYDROCHLORIDE 50 MG/ML
25 INJECTION, SOLUTION INTRAMUSCULAR; INTRAVENOUS AS NEEDED
Status: CANCELLED
Start: 2020-01-01

## 2020-01-01 RX ORDER — LEVOFLOXACIN 500 MG/1
500 TABLET, FILM COATED ORAL DAILY
Qty: 7 TAB | Refills: 0 | Status: SHIPPED | OUTPATIENT
Start: 2020-01-01

## 2020-01-01 RX ORDER — ONDANSETRON 2 MG/ML
8 INJECTION INTRAMUSCULAR; INTRAVENOUS AS NEEDED
Status: CANCELLED | OUTPATIENT
Start: 2020-01-01

## 2020-01-01 RX ORDER — ACETAMINOPHEN 325 MG/1
650 TABLET ORAL AS NEEDED
Status: CANCELLED
Start: 2020-02-13

## 2020-01-01 RX ORDER — ALBUTEROL SULFATE 0.83 MG/ML
2.5 SOLUTION RESPIRATORY (INHALATION) AS NEEDED
Status: CANCELLED
Start: 2020-02-27

## 2020-01-01 RX ORDER — DIPHENHYDRAMINE HYDROCHLORIDE 50 MG/ML
50 INJECTION, SOLUTION INTRAMUSCULAR; INTRAVENOUS ONCE
Status: DISCONTINUED | OUTPATIENT
Start: 2020-01-01 | End: 2020-01-01

## 2020-01-01 RX ORDER — ALBUTEROL SULFATE 0.83 MG/ML
2.5 SOLUTION RESPIRATORY (INHALATION) AS NEEDED
Status: CANCELLED
Start: 2020-02-13

## 2020-01-01 RX ORDER — ACETAMINOPHEN 325 MG/1
650 TABLET ORAL AS NEEDED
Status: CANCELLED
Start: 2020-01-01

## 2020-01-01 RX ORDER — NALOXONE HYDROCHLORIDE 0.4 MG/ML
0.4 INJECTION, SOLUTION INTRAMUSCULAR; INTRAVENOUS; SUBCUTANEOUS AS NEEDED
Status: DISCONTINUED | OUTPATIENT
Start: 2020-01-01 | End: 2020-01-01 | Stop reason: HOSPADM

## 2020-01-01 RX ORDER — SODIUM CHLORIDE 0.9 % (FLUSH) 0.9 %
5-40 SYRINGE (ML) INJECTION AS NEEDED
Status: DISCONTINUED | OUTPATIENT
Start: 2020-01-01 | End: 2020-01-01 | Stop reason: HOSPADM

## 2020-01-01 RX ORDER — FUROSEMIDE 40 MG/1
40 TABLET ORAL 2 TIMES DAILY
Status: DISCONTINUED | OUTPATIENT
Start: 2020-01-01 | End: 2020-01-01 | Stop reason: HOSPADM

## 2020-01-01 RX ORDER — ACETAMINOPHEN 325 MG/1
650 TABLET ORAL ONCE
Status: CANCELLED
Start: 2020-02-20

## 2020-01-01 RX ORDER — ONDANSETRON 2 MG/ML
8 INJECTION INTRAMUSCULAR; INTRAVENOUS AS NEEDED
Status: CANCELLED | OUTPATIENT
Start: 2020-02-20

## 2020-01-01 RX ORDER — DIPHENHYDRAMINE HYDROCHLORIDE 50 MG/ML
50 INJECTION, SOLUTION INTRAMUSCULAR; INTRAVENOUS ONCE
Status: CANCELLED
Start: 2020-02-27

## 2020-01-01 RX ORDER — HYDROCORTISONE SODIUM SUCCINATE 100 MG/2ML
100 INJECTION, POWDER, FOR SOLUTION INTRAMUSCULAR; INTRAVENOUS AS NEEDED
Status: CANCELLED | OUTPATIENT
Start: 2020-02-13

## 2020-01-01 RX ORDER — OXYCODONE AND ACETAMINOPHEN 5; 325 MG/1; MG/1
1 TABLET ORAL
Status: DISCONTINUED | OUTPATIENT
Start: 2020-01-01 | End: 2020-01-01 | Stop reason: HOSPADM

## 2020-01-01 RX ORDER — HEPARIN 100 UNIT/ML
300-500 SYRINGE INTRAVENOUS AS NEEDED
Status: CANCELLED
Start: 2020-02-20

## 2020-01-01 RX ORDER — AMIODARONE HYDROCHLORIDE 150 MG/3ML
INJECTION, SOLUTION INTRAVENOUS
Status: COMPLETED | OUTPATIENT
Start: 2020-01-01 | End: 2020-01-01

## 2020-01-01 RX ORDER — LEVOFLOXACIN 500 MG/1
500 TABLET, FILM COATED ORAL DAILY
Status: DISCONTINUED | OUTPATIENT
Start: 2020-01-01 | End: 2020-01-01 | Stop reason: DRUGHIGH

## 2020-01-01 RX ORDER — DIPHENHYDRAMINE HYDROCHLORIDE 50 MG/ML
50 INJECTION, SOLUTION INTRAMUSCULAR; INTRAVENOUS AS NEEDED
Status: CANCELLED
Start: 2020-01-01

## 2020-01-01 RX ORDER — DIPHENHYDRAMINE HYDROCHLORIDE 50 MG/ML
50 INJECTION, SOLUTION INTRAMUSCULAR; INTRAVENOUS AS NEEDED
Status: CANCELLED
Start: 2020-02-13

## 2020-01-01 RX ORDER — ONDANSETRON 2 MG/ML
4 INJECTION INTRAMUSCULAR; INTRAVENOUS
Status: DISCONTINUED | OUTPATIENT
Start: 2020-01-01 | End: 2020-01-01 | Stop reason: HOSPADM

## 2020-01-01 RX ORDER — DIPHENHYDRAMINE HYDROCHLORIDE 50 MG/ML
25 INJECTION, SOLUTION INTRAMUSCULAR; INTRAVENOUS AS NEEDED
Status: CANCELLED
Start: 2020-02-20

## 2020-01-01 RX ORDER — HYDRALAZINE HYDROCHLORIDE 25 MG/1
25 TABLET, FILM COATED ORAL AS NEEDED
COMMUNITY

## 2020-01-01 RX ORDER — GUAIFENESIN/DEXTROMETHORPHAN 100-10MG/5
10 SYRUP ORAL
Status: DISCONTINUED | OUTPATIENT
Start: 2020-01-01 | End: 2020-01-01 | Stop reason: HOSPADM

## 2020-01-01 RX ORDER — DIPHENHYDRAMINE HCL 25 MG
50 CAPSULE ORAL ONCE
Status: COMPLETED | OUTPATIENT
Start: 2020-01-01 | End: 2020-01-01

## 2020-01-01 RX ORDER — DUTASTERIDE 0.5 MG/1
0.5 CAPSULE, LIQUID FILLED ORAL DAILY
Status: DISCONTINUED | OUTPATIENT
Start: 2020-01-01 | End: 2020-01-01 | Stop reason: HOSPADM

## 2020-01-01 RX ORDER — FENTANYL CITRATE 50 UG/ML
100 INJECTION, SOLUTION INTRAMUSCULAR; INTRAVENOUS
Status: DISCONTINUED | OUTPATIENT
Start: 2020-01-01 | End: 2020-01-01

## 2020-01-01 RX ADMIN — FENTANYL CITRATE 25 MCG: 50 INJECTION, SOLUTION INTRAMUSCULAR; INTRAVENOUS at 10:50

## 2020-01-01 RX ADMIN — MIDAZOLAM 1 MG: 1 INJECTION INTRAMUSCULAR; INTRAVENOUS at 10:42

## 2020-01-01 RX ADMIN — DOXAZOSIN 1 MG: 2 TABLET ORAL at 09:49

## 2020-01-01 RX ADMIN — RITUXIMAB 743 MG: 10 INJECTION, SOLUTION INTRAVENOUS at 12:53

## 2020-01-01 RX ADMIN — ACETAMINOPHEN 650 MG: 325 TABLET ORAL at 12:13

## 2020-01-01 RX ADMIN — SODIUM BICARBONATE 50 MEQ: 84 INJECTION, SOLUTION INTRAVENOUS at 00:58

## 2020-01-01 RX ADMIN — HYDRALAZINE HYDROCHLORIDE 25 MG: 25 TABLET ORAL at 22:16

## 2020-01-01 RX ADMIN — ACETAMINOPHEN 650 MG: 325 TABLET ORAL at 23:29

## 2020-01-01 RX ADMIN — ACETAMINOPHEN 650 MG: 325 TABLET ORAL at 19:43

## 2020-01-01 RX ADMIN — Medication 10 ML: at 22:16

## 2020-01-01 RX ADMIN — MIDAZOLAM 0.5 MG: 1 INJECTION INTRAMUSCULAR; INTRAVENOUS at 10:56

## 2020-01-01 RX ADMIN — AMIODARONE HYDROCHLORIDE 300 MG: 50 INJECTION, SOLUTION INTRAVENOUS at 01:02

## 2020-01-01 RX ADMIN — GUAIFENESIN AND DEXTROMETHORPHAN 10 ML: 100; 10 SYRUP ORAL at 23:29

## 2020-01-01 RX ADMIN — EPINEPHRINE 1 MG: 0.1 INJECTION INTRACARDIAC; INTRAVENOUS at 01:06

## 2020-01-01 RX ADMIN — HYDRALAZINE HYDROCHLORIDE 25 MG: 25 TABLET ORAL at 16:00

## 2020-01-01 RX ADMIN — Medication 10 ML: at 07:29

## 2020-01-01 RX ADMIN — SODIUM CHLORIDE 250 ML: 900 INJECTION, SOLUTION INTRAVENOUS at 13:23

## 2020-01-01 RX ADMIN — FENTANYL CITRATE 25 MCG: 50 INJECTION, SOLUTION INTRAMUSCULAR; INTRAVENOUS at 10:55

## 2020-01-01 RX ADMIN — DUTASTERIDE 0.5 MG: 0.5 CAPSULE, LIQUID FILLED ORAL at 09:50

## 2020-01-01 RX ADMIN — SODIUM CHLORIDE 25 ML/HR: 900 INJECTION, SOLUTION INTRAVENOUS at 10:01

## 2020-01-01 RX ADMIN — FENTANYL CITRATE 25 MCG: 50 INJECTION, SOLUTION INTRAMUSCULAR; INTRAVENOUS at 10:41

## 2020-01-01 RX ADMIN — EPINEPHRINE 1 MG: 0.1 INJECTION INTRACARDIAC; INTRAVENOUS at 01:00

## 2020-01-01 RX ADMIN — Medication 10 ML: at 14:00

## 2020-01-01 RX ADMIN — FUROSEMIDE 40 MG: 40 TABLET ORAL at 04:15

## 2020-01-01 RX ADMIN — HYDRALAZINE HYDROCHLORIDE 25 MG: 25 TABLET ORAL at 09:50

## 2020-01-01 RX ADMIN — EPINEPHRINE 1 MG: 0.1 INJECTION INTRACARDIAC; INTRAVENOUS at 00:55

## 2020-01-01 RX ADMIN — ALTEPLASE: 2.2 INJECTION, POWDER, LYOPHILIZED, FOR SOLUTION INTRAVENOUS at 17:45

## 2020-01-01 RX ADMIN — FUROSEMIDE 40 MG: 40 TABLET ORAL at 18:25

## 2020-01-01 RX ADMIN — EPINEPHRINE 1 MG: 0.1 INJECTION INTRACARDIAC; INTRAVENOUS at 01:03

## 2020-01-01 RX ADMIN — SODIUM CHLORIDE 25 ML/HR: 900 INJECTION, SOLUTION INTRAVENOUS at 12:14

## 2020-01-01 RX ADMIN — DIPHENHYDRAMINE HYDROCHLORIDE 25 MG: 25 CAPSULE ORAL at 12:13

## 2020-01-07 PROBLEM — C83.30 DIFFUSE LARGE B-CELL LYMPHOMA (HCC): Status: ACTIVE | Noted: 2020-01-01

## 2020-01-07 NOTE — PROGRESS NOTES
Check PleurX. 1. Have you been to the ER, urgent care clinic since your last visit? Hospitalized since your last visit? Yes, [facility] on 12/23/19 for dyspnea. 2. Have you seen or consulted any other health care providers outside of the [address] [place] [address] since your last visit? Include any pap smears or colon screening.  No

## 2020-01-07 NOTE — PROGRESS NOTES
Subjective:      Trini Fletcher is a 80 y.o. male presents for postop care 2 weeks following 1. Placement of left PleurX catheter. 2.  Intraoperative ultrasound with interpretation for Left malignant pleural effusion & B-cell lymphoma by Dr. Mirella Reina on 2019. Mr. Elliot Mathews is drained every other day by home health. He reports that they are evacuating about 500ml each time, although it was down a bit this morning. He states that his breathing is \"OK\" & on RA    Appetite is normal. Eating a regular diet without difficulty. Bowel movements are regular. The patient is voiding without difficulty. The patient is not having any pain. .    Patient has an advanced directive: not on file  Mr. Jonathan Kent has a reminder for a \"due or due soon\" health maintenance. I have asked that he contact his primary care provider for follow-up on this health maintenance. Objective:     Visit Vitals  /81 (BP 1 Location: Right arm, BP Patient Position: Sitting)   Pulse (!) 56   Temp 97.4 °F (36.3 °C) (Oral)   Resp 18   Ht 6' 1\" (1.854 m)   Wt 160 lb (72.6 kg)   SpO2 98%   BMI 21.11 kg/m²     CXR (2020) shows:  IMPRESSION:  Decreased small left pleural effusion with a basilar left chest tube in place. Resolved pulmonary edema. General:  alert, cooperative, no distress, appears stated age, thin extremities   Pulm: Clear breath sounds bilat, no resp distress. L PleurX catheter without surround erythema or ecchemoses. Dressed. Incision:   healing well, no erythema, no dehiscence, incision dresssed     Assessment:     Stable postoperatively. CXR reviewed & shows mild improvement    Plan/Recommendations/Medical Decision Makin. Continue PleurX & every other day draining by home health  2. RTC PRN    Mr. Jonathan Kent & family verbalized understanding and questions were answered to the best of my knowledge and ability. Healthy eating educational materials were provided.     All questions were personally answered. Thank you for allowing us to participate in the care of your patient.     Colin Orantes, [place]  Thoracic Surgery  1/7/2020

## 2020-01-07 NOTE — PROGRESS NOTES
[facility] at [address], [id number] [place] [address] Road, [address] [place] [address]  Otto Johnsonman: [phone]  F: [phone]    Reason for Visit:   George Mcpherson is a 80 y.o. male who is seen in consultation at the request of Dr. 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 male with multiple co morbidities as below specifically CKD, CHF , personal h/o colon cancer s/o hemicolectomy 2015 and Afib on Eliquis is seen for B cell Lymphoma    He has had recurrent pleural effusions and was admitted 12/4/19-12/9/19 and had a left sided thoracentesis. Cytology of his pleural fluid was consistent with B-cell lymphoma. He had a PET scan scheduled and was to be seen by medical oncology but presented to the ER with SOB on 12/23/19. Was found to have recurrent L sided effusion and had a L sided PleurX cath placed by Dr. Herman Givens. He now comes to review further management. He states that his problems started about 6 months ago when he noted LE edema thought to be from CHF. This resolved the edema but then he developed progressive SOB. His L chest tube has been putting out 500 cc every other day and its clear. He is using a walker and is ambulating some 100 feet and does not have SOB with that. He has had no fevers, chills, sweats, weight or appetite changes. No bleeding.     He drinks wine  He quit smoking    Fh OF Leukemia and colon cancer      Past Medical History:   Diagnosis Date    Arrhythmia     irrigular heart beat- A FIB    Cancer ([facility] [address] 2013    COLON     Cancer [facility])     SKIN    Colon cancer ([facility] [address] 2013    Heart failure (HCC)     Hypertension       Past Surgical History:   Procedure Laterality Date    HX OTHER SURGICAL      several basal cell removals & cyst removed from chest    HX TONSIL AND ADENOIDECTOMY      AS CHILD      Social History     Tobacco Use    Smoking status: Former Smoker     Packs/day: 0.50     Years: 32.00     Pack years: 16.00     Types: Cigarettes     Last attempt to quit: 1983     Years since quittin.5    Smokeless tobacco: Never Used   Substance Use Topics    Alcohol use: Yes     Comment: edie/gin 7 per week      Family History   Problem Relation Age of Onset    Colon Cancer Mother     Lung Cancer Father     Cancer Brother         UNKNOWN     Current Outpatient Medications   Medication Sig    furosemide (LASIX) 40 mg tablet Take 40 mg by mouth two (2) times a day.  loperamide (IMODIUM) 2 mg capsule Take 2 mg by mouth daily as needed.  potassium chloride SR (KLOR-CON 10) 10 mEq tablet Take 20 mEq by mouth daily.  guaiFENesin-dextromethorphan (ROBAFEN DM COUGH)  mg/5 mL liqd Take 30 mL by mouth every four (4) hours as needed.  diphenhydrAMINE (BENADRYL ALLERGY) 25 mg tablet Take 12.5 mg by mouth nightly as needed for Sleep.  apixaban (ELIQUIS) 2.5 mg tablet Take 2.5 mg by mouth two (2) times a day. Indications: Atrial Fibrillation    ferrous sulfate 325 mg (65 mg iron) tablet Take 325 mg by mouth two (2) times a day.  levothyroxine (SYNTHROID) 175 mcg tablet Take 175 mcg by mouth Daily (before breakfast).  melatonin 10 mg tab Take 1 Tab by mouth nightly.  omeprazole (PRILOSEC) 20 mg capsule Take 20 mg by mouth two (2) times a day.  thiamine HCL (B-1) 100 mg tablet Take 100 mg by mouth daily.  triamcinolone acetonide (KENALOG) 0.1 % topical cream Apply  to affected area two (2) times daily as needed for Skin Irritation or Itching.  use thin layer    cholecalciferol, vitamin D3, (VITAMIN D3) 2,000 unit tab Take 1 Tab by mouth daily.    dutasteride (AVODART) 0.5 mg capsule Take 0.5 mg by mouth daily.  terazosin (HYTRIN) 10 mg capsule Take 10 mg by mouth daily. Indications: high blood pressure     No current facility-administered medications for this visit. No Known Allergies     Review of Systems: A complete review of systems was obtained, negative except as described above. Physical Exam:     Visit Vitals  Ht 6' 1\" (1.854 m)   BMI 21.11 kg/m²     ECOG PS: 2  General: No distress, Uses a walker  Eyes: PERRLA, anicteric sclerae  HENT: Atraumatic, OP clear  Neck: Supple  Lymphatic: No cervical, supraclavicular, or inguinal adenopathy  Respiratory: normal respiratory effort  CV: Normal rate, no peripheral edema  GI: Soft, nontender, nondistended, no masses, no hepatomegaly, no splenomegaly  MS: Normal gait and station. Skin: Multiple ecchymoses, or petechiae. Normal temperature, turgor, and texture. Psych: Alert, oriented, appropriate affect, normal judgment/insight    Results:     Lab Results   Component Value Date/Time    WBC 5.2 12/23/2019 09:45 AM    HGB 9.5 (L) 12/23/2019 09:45 AM    HCT 31.3 (L) 12/23/2019 09:45 AM    PLATELET 147 (L) 18/73/4860 09:45 AM    .0 (H) 12/23/2019 09:45 AM    ABS. NEUTROPHILS 4.3 12/23/2019 09:45 AM     Lab Results   Component Value Date/Time    Sodium 146 (H) 12/23/2019 09:45 AM    Potassium 4.3 12/23/2019 09:45 AM    Chloride 112 (H) 12/23/2019 09:45 AM    CO2 28 12/23/2019 09:45 AM    Glucose 120 (H) 12/23/2019 09:45 AM    BUN 55 (H) 12/23/2019 09:45 AM    Creatinine 2.35 (H) 12/23/2019 09:45 AM    GFR est AA 32 (L) 12/23/2019 09:45 AM    GFR est non-AA 26 (L) 12/23/2019 09:45 AM    Calcium 8.9 12/23/2019 09:45 AM    Glucose (POC) 148 (H) 07/11/2013 09:36 PM     Lab Results   Component Value Date/Time    Bilirubin, total 0.5 12/23/2019 09:45 AM    ALT (SGPT) 29 12/23/2019 09:45 AM    AST (SGOT) 31 12/23/2019 09:45 AM    Alk.  phosphatase 75 12/23/2019 09:45 AM    Protein, total 6.8 12/23/2019 09:45 AM    Albumin 3.1 (L) 12/23/2019 09:45 AM    Globulin 3.7 12/23/2019 09:45 AM     Lab Results   Component Value Date/Time    Ferritin 132 07/26/2019 06:03 AM    Vitamin B12 558 07/26/2019 06:03 AM    Folate 9.8 07/26/2019 06:03 AM     (H) 12/17/2019 02:15 PM    TSH 4.08 (H) 12/23/2019 09:45 AM    M-Deion Not Observed 07/27/2019 03:27 AM    HIV SCREEN 4TH GENERATION WRFX Non Reactive 12/17/2019 02:15 PM     Lab Results   Component Value Date/Time    INR 1.1 12/23/2019 09:45 AM    aPTT 33.3 (H) 12/23/2019 09:45 AM       Records reviewed and summarized above. Pathology report(s) reviewed     PLeural fluid cytology     CYTOLOGIC INTERPRETATION:   Pleural Fluid, ThinPrep and cell block:   B-cell lymphoma, see comment   General Categorization   Positive for malignancy. Specimen Adequacy   Satisfactory for evaluation. CPT: [id number], [id number], [id number], [id number], [id number], [id number]   Comment   Overall findings are diagnostic of a B-cell lymphoma. The differential diagnosis includes diffuse large B-cell lymphoma, B-cell prolymphocytic leukemia/lymphoma, and chronic lymphocytic leukemia/small lymphocytic lymphoma. Correlation with radiology and biopsy/excision of suspicious areas is recommended. Bone marrow biopsy is also recommended, if clinically indicated. This case is seen in consultation with Dr. Arnie Luna, who agrees with the above findings. Immunohistochemical stains are performed from block 1A. CD20 is diffusely positive, indicating a B-cell lymphoproliferative disorder. CD3 marks few background small T-cells. Cyclin D1 and SOX11 stains are negative, making mantle cell lymphoma less likely. EBV in situ hybridization is also negative. Immunohistochemical positive and negative controls stain appropriately. Concurrent flow cytometric studies are reviewed.  Appropriate immunohistochemical stains are ordered and evaluated in order to further characterize and/or quantify the process observed by light microscopy     Flow Cytometry:   Diagnosis: Monoclonal B-cells, consistent with a B-cell lymphoproliferative disorder (see comments). Comments: Flow cytometry shows a monoclonal B-cell population (94% of total cells) co-expression CD5 and CD23, consistent with a B-cell lymphoma/leukemia. B-lymphocytes appear to be mostly intermediate to large based on forward light scatter characteristics. The differential diagnosis includes but is not limited to chronic lymphocytic leukemia/small lymphocytic lymphoma, prolymphocytic B-cell leukemia/lymphoma, mantle cell lymphoma, and diffuse large B-cell lymphoma. Please correlate the result with morphologic findings and clinical information     Radiology report(s) reviewed above. Assessment:   1) B cell Lymphoma    With bilateral ( L> R) Pleural effusions s/p Pleurx placement  PET CT with no other clear site of disease apart from vague Lung consolidative changes  Pleural fluid cytology is reviewed with Heme Path Dr. 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 Mantle cell is ruled our as is primary effusion Lymphoma    He has a reasonable PS status despite is age and co morbidities  I think the most crucial thing is to determine whether he has DLBCL as opposed to the other 2 entities mentioned above. The latter two may have an indolent course and hence if that is the case we could consider Rituxan alone or BTK inhibitors if he had a 17pdel.  Alternatively, now since he has a pleurx we could also observe without treatments  However if he has a DLBCL the decision would be more challenging as I dont see him tolerating RCHOP  I suppose if we could determine whether this is ABC subtype or not would be helpful as choices might include R-Revlimid- RCVP and mini doses or BR    2) Bilateral Pleural effusions- Malignant  L> R  S/P L pleurx    3) Anemia   Secondary to CKD and likely malignancy  Bm Involvement seems less likely with a negative PET    4) CKD    5) Afib on Eliquis    6) H/O Colon cancer s/p resection 2015    7) Psychosocial  Supportive Family          Plan:     · PB Immunophenotype and CLL FISH  · BM Biopsy  · RTC 10 days    > 50% of this 60 min visit spent in counseling and care co ordination    I appreciate the opportunity to participate in Mr. Johnny De La Paz's care.     Signed By: Colton Toribio MD

## 2020-01-07 NOTE — PROGRESS NOTES
Cathy Barker is a 80 y.o. male  Chief Complaint   Patient presents with    New Patient     B-Cell Lymphoma of intrathoracic lymph nodes     1. Have you been to the ER, urgent care clinic since your last visit? Hospitalized since your last visit? This is the patients first time here in this office. 2. Have you seen or consulted any other health care providers outside of the [address] [place] [address] since your last visit? Include any pap smears or colon screening. This is the patients first time here in this office.

## 2020-01-07 NOTE — PATIENT INSTRUCTIONS
Learning About Dietary Guidelines  What are the Dietary Guidelines for Americans? Dietary Guidelines for Americans provide tips for eating well and staying healthy. This helps reduce the risk for long-term (chronic) diseases. These adult guidelines from the [place] recommend that you:  · Eat lots of fruits, vegetables, whole grains, and low-fat or nonfat dairy products. · Try to balance your eating with your activity. This helps you stay at a healthy weight. · Drink alcohol in moderation, if at all. · Limit foods high in salt, saturated fat, trans fat, and added sugar. What is MyPlate? MyPlate is the U.[place] government's food guide. It can help you make your own well-balanced eating plan. A balanced eating plan means that you eat enough, but not too much, and that your food gives you the nutrients you need to stay healthy. MyPlate focuses on eating plenty of whole grains, fruits, and vegetables, and on limiting fat and sugar. It is available online at www. ChooseMyPlate.gov. How can you get started? MyPlate suggests that most adults eat certain amounts from the different food groups:  Grains  Eat 5 to 8 ounces of grains each day. Half of those should be whole grains. Choose whole-grain breads, cold and cooked cereals and grains, pasta (without creamy sauces), hard rolls, or low-fat or fat-free crackers. Vegetables  Eat 2 to 3 cups of vegetables every day. They contain little if any fat. And they have lots of nutrients that help protect against heart disease. Fruits  Eat 1½ to 2 cups of fruits every day. Fruits contain very little fat but lots of nutrients. Protein foods  Most adults need 5 to 6½ ounces each day. Choose fish and lean poultry more often. Eat red meat and fried meats less often. Dried beans, tofu, and nuts are also good sources of protein. Dairy  Most adults need 3 cups of milk and milk products a day. Choose low-fat or fat-free products from this food group.  If you have problems digesting milk, try eating cheese or yogurt instead. Limit fats and oils, including those used in cooking. When you do use fats, choose oils that are liquid at room temperature (unsaturated fats). These include canola oil and olive oil. Avoid foods with trans fats, such as many fried foods, cookies, and snack foods. Where can you learn more? Go to http://jaylon-neil.info/. Enter Z866 in the search box to learn more about \"Learning About Dietary Guidelines. \"  Current as of: November 7, 2018  Content Version: 12.2  © 3905-2815 [employer]. Care instructions adapted under license by [employer] (which disclaims liability or warranty for this information). If you have questions about a medical condition or this instruction, always ask your healthcare professional. [address] any warranty or liability for your use of this information.

## 2020-01-09 NOTE — TELEPHONE ENCOUNTER
Patients daughter, dhaval, called and stated that she would like a call back from the nurse to discuss appointment. Daughter stated that she would like to know if the appointment can be moved to 11:00.  # [phone]

## 2020-01-09 NOTE — TELEPHONE ENCOUNTER
Called both [facility] confirmed x2 identifiers     Provided date and time for Biopsy   1/17/2020   7AM arrival 9AM appointment time   Arrive at outpatient registration   No aspirin starting today 1/9/2019   No eliquis 48hrs prior to procedure     Daughters verbalized understanding and requested to contact [address] [place] [address] to notify Norma or Natalya Gee ([facility] RNs) of procedure     Contacted both Norma and patient   Provided with date and time and requirements prior to procedure   Patient verbalized understanding     No new questions or concerns at this time

## 2020-01-13 NOTE — TELEPHONE ENCOUNTER
Returned patient's call from message from [facility]. Patient asked when his upcoming appointment with us was. I advised him that he does not have one. I advised him that his daughter, Bobby Hager has all of his appointments written down. The patient verbalized understanding and thanked me.

## 2020-01-17 NOTE — DISCHARGE INSTRUCTIONS
Side effects of sedation medications and other medications used today have been reviewed. Notify us of nausea, itching, hives, dizziness, or anything else out of the ordinary. Should you experience any of these significant changes, please call [phone] between the hours of 7:30 am and 10 pm or [phone] after hours. After hours, ask the  to page the [address] Technologist, and describe the problem to the technologist.     Radiologist: Dr. Teto Patel    Date:  1/17/2020         Bone Marrow Biopsy Discharge Instructions      Go home and rest  and restrict your activity the next 24 hours. You have been given sedating medications, so do not drive or drink alcohol today. Resume your previous diet and medications. You may shower in 24 hours. Do not soak or swim until the biopsy site has healed completely to minimize any risk of infection. Watch site for redness, drainage, pus, foul odor, increasing pain and fevers. Should this occur call you doctor immediatly. You may take Tylenol, as directed on the label, for pain or discomfort. Avoid Ibuprofen (Advil, Motrin etc.) and Aspirin today as they may increase your risk of bleeding. Be sure to follow up with your physician, and let him know how you are progressing. Your results will be sent to your physician as soon as they become available. [address] [facility]      BIOPSY DISCHARGE INSTRUCTIONS    General Instructions:     A biopsy is the removal of a small piece of tissue for microscopic examination or testing. Healthy tissue can be obtained for the purpose of tissue-type matching for transplants. Unhealthy tissues are more commonly biopsied to diagnose disease. General Biopsy:     A mass can grow in any area of the body, and we are taking a specimen as ordered by your doctor. The risks are the same. They include bleeding, pain, and infection.      Home Care Instructions: You may resume your regular diet and medication regimen. Do not drink alcohol, drive, or make any important legal decisions in the next 24 hours. Do not lift anything heavier than a gallon of milk until the soreness goes away. You may use over the counter acetaminophen or ibuprofen for the soreness. You may apply an ice pack to the affected area for 20-30 minutes at time for the first 24 hours. After that, you may apply a heat pack. Call If: You should call your Physician and/or the Radiology Nurse if you have any questions or concerns about the biopsy site. Call if you should have increased pain, fever, redness, drainage, or bleeding more than a small spot on the bandage. Follow-Up Instructions: Please see your ordering doctor as he/she has requested.       Patient Signature:  Date: 1/17/2020  Discharging Nurse: Anish Ortega RN

## 2020-01-17 NOTE — PROGRESS NOTES
1230 pm- Discharge instructions reviewed with patient and daughter with good understanding. IV removed. Patient taken to car via wheelchair with nurse and daughter at side. Patient discharged stable.

## 2020-01-17 NOTE — PROGRESS NOTES
8478 am- Patient arrived to [facility] for procedure. Intake started. 0945 am- Blood work sent to lab. Dr. Sharifa Lyon in to talk with patient about BM Bx. Consent signed.

## 2020-01-20 NOTE — PROGRESS NOTES
Called pathology regarding biopsy results; was informed results in process but should be posted by end of today

## 2020-01-21 PROBLEM — C85.80: Status: ACTIVE | Noted: 2020-01-01

## 2020-01-21 NOTE — PROGRESS NOTES
[facility] [place] [facility] [place] [address] [address], [address] W: [phone]  F: [phone] Reason for Visit:  
Kristen Wilkerson is a 80 y.o. male who is seen  B cell Lymphoma- NOS Treatment History:  
· 12/4/19: CT chest : Bilateral pleural effusions, smaller right (decreased), and very large on the left with left hemidiaphragmatic inversion (increased.) · 12/5/19: Thoracentesis B cell Lymphoma · 12/27/19: PET CT showed LLL consolidative airspace disease is likely neoplastic, Bibasilar pleural rinds are likely neoplastic · 1/17/2020: BM biopsy- no evidence of Lymphoma. Peripheral blood - no abnormal cells and FISH negative History of Present Illness:  
Patient is a 80 y.o. male with multiple co morbidities as below specifically CKD, CHF , personal h/o colon cancer s/o hemicolectomy 2015 and Afib on Eliquis is seen for B cell Lymphoma He has had recurrent pleural effusions and was admitted 12/4/19-12/9/19 and had a left sided thoracentesis. Cytology of his pleural fluid was consistent with B-cell lymphoma. He had a PET scan scheduled and was to be seen by medical oncology but presented to the ER with SOB on 12/23/19. Was found to have recurrent L sided effusion and had a L sided PleurX cath placed by Dr. Rachel Vaughan. He now comes to review management after BM biopsy and labs. He has the L pleurx drained every other day and this has increased from 500 cc to 700 cc. He does get a little SOB. Fluid is a little murky but still yellow. He has had no fevers, chills, sweats, weight or appetite changes. No bleeding. Comes with daughters and wife He drinks wine He quit smoking Fh OF Leukemia and colon cancer Past Medical History:  
Diagnosis Date  Arrhythmia   
 irrigular heart beat- A FIB  Cancer [facility]) 2013 COLON   
 Cancer ([address] SKIN  
 Colon cancer ([facility] 7/1/2013  Heart failure ([address]  Hypertension Past Surgical History: Procedure Laterality Date  HX OTHER SURGICAL    
 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  
 HX TONSIL AND ADENOIDECTOMY AS CHILD Social History Tobacco Use  Smoking status: Former Smoker Packs/day: 0.50 Years: 32.00 Pack years: 16.00 Types: Cigarettes Last attempt to quit: 1983 Years since quittin.5  Smokeless tobacco: Never Used Substance Use Topics  Alcohol use: Yes Comment: edie/gin 7 per week Family History Problem Relation Age of Onset  Colon Cancer Mother  Lung Cancer Father  Cancer Brother UNKNOWN Current Outpatient Medications Medication Sig  furosemide (LASIX) 40 mg tablet Take 40 mg by mouth two (2) times a day.  loperamide (IMODIUM) 2 mg capsule Take 2 mg by mouth daily as needed.  potassium chloride SR (KLOR-CON 10) 10 mEq tablet Take 20 mEq by mouth daily.  guaiFENesin-dextromethorphan (ROBAFEN DM COUGH)  mg/5 mL liqd Take 30 mL by mouth every four (4) hours as needed.  diphenhydrAMINE (BENADRYL ALLERGY) 25 mg tablet Take 12.5 mg by mouth nightly as needed for Sleep.  apixaban (ELIQUIS) 2.5 mg tablet Take 2.5 mg by mouth two (2) times a day. Indications: Atrial Fibrillation  ferrous sulfate 325 mg (65 mg iron) tablet Take 325 mg by mouth two (2) times a day.  levothyroxine (SYNTHROID) 175 mcg tablet Take 175 mcg by mouth Daily (before breakfast).  melatonin 10 mg tab Take 1 Tab by mouth nightly.  omeprazole (PRILOSEC) 20 mg capsule Take 20 mg by mouth two (2) times a day.  thiamine HCL (B-1) 100 mg tablet Take 100 mg by mouth daily.  triamcinolone acetonide (KENALOG) 0.1 % topical cream Apply  to affected area two (2) times daily as needed for Skin Irritation or Itching. use thin layer  cholecalciferol, vitamin D3, (VITAMIN D3) 2,000 unit tab Take 1 Tab by mouth daily.  dutasteride (AVODART) 0.5 mg capsule Take 0.5 mg by mouth daily.  terazosin (HYTRIN) 10 mg capsule Take 10 mg by mouth daily. Indications: high blood pressure No current facility-administered medications for this visit. No Known Allergies Review of Systems: A complete review of systems was obtained, negative except as described above. Physical Exam:  
 
Visit Vitals /82 (BP 1 Location: Right arm) Pulse (!) 52 Temp 98.2 °F (36.8 °C) Ht 6' 1\" (1.854 m) Wt 167 lb 1.6 oz (75.8 kg) SpO2 94% BMI 22.05 kg/m² ECOG PS: 2 General: No distress, Uses a walker Eyes: PERRLA, anicteric sclerae HENT: Atraumatic, OP clear CV: Normal rate, no peripheral edema MS: Normal gait and station. Skin: Multiple ecchymoses, or petechiae. Normal temperature, turgor, and texture. Psych: Alert, oriented, appropriate affect, normal judgment/insight Results:  
 
Lab Results Component Value Date/Time WBC 3.9 (L) 01/17/2020 09:46 AM  
 HGB 9.1 (L) 01/17/2020 09:46 AM  
 HCT 30.2 (L) 01/17/2020 09:46 AM  
 PLATELET 120 (L) 26/87/5532 09:46 AM  
 .7 (H) 01/17/2020 09:46 AM  
 ABS. NEUTROPHILS 3.0 01/17/2020 09:46 AM  
 
Lab Results Component Value Date/Time Sodium 146 (H) 12/23/2019 09:45 AM  
 Potassium 4.3 12/23/2019 09:45 AM  
 Chloride 112 (H) 12/23/2019 09:45 AM  
 CO2 28 12/23/2019 09:45 AM  
 Glucose 120 (H) 12/23/2019 09:45 AM  
 BUN 55 (H) 12/23/2019 09:45 AM  
 Creatinine 2.35 (H) 12/23/2019 09:45 AM  
 GFR est AA 32 (L) 12/23/2019 09:45 AM  
 GFR est non-AA 26 (L) 12/23/2019 09:45 AM  
 Calcium 8.9 12/23/2019 09:45 AM  
 Glucose (POC) 148 (H) 07/11/2013 09:36 PM  
 
Lab Results Component Value Date/Time Bilirubin, total 0.5 12/23/2019 09:45 AM  
 ALT (SGPT) 29 12/23/2019 09:45 AM  
 AST (SGOT) 31 12/23/2019 09:45 AM  
 Alk. phosphatase 75 12/23/2019 09:45 AM  
 Protein, total 6.8 12/23/2019 09:45 AM  
 Albumin 3.1 (L) 12/23/2019 09:45 AM  
 Globulin 3.7 12/23/2019 09:45 AM  
 
Lab Results Component Value Date/Time Ferritin 132 07/26/2019 06:03 AM  
 Vitamin B12 558 07/26/2019 06:03 AM  
 Folate 9.8 07/26/2019 06:03 AM  
  (H) 12/17/2019 02:15 PM  
 TSH 4.08 (H) 12/23/2019 09:45 AM  
 M-Deion Not Observed 07/27/2019 03:27 AM  
 HIV SCREEN 4TH GENERATION WRFX Non Reactive 12/17/2019 02:15 PM  
 
Lab Results Component Value Date/Time INR 1.1 12/23/2019 09:45 AM  
 aPTT 33.3 (H) 12/23/2019 09:45 AM  
 
 
Records reviewed and summarized above. Pathology report(s) reviewed PLeural fluid cytology CYTOLOGIC INTERPRETATION:  
Pleural Fluid, ThinPrep and cell block:  
B-cell lymphoma, see comment General Categorization Positive for malignancy. Specimen Adequacy Satisfactory for evaluation. CPT: [id number], [id number], [id number], [id number], [id number], [id number] Comment Overall findings are diagnostic of a B-cell lymphoma. The differential diagnosis includes diffuse large B-cell lymphoma, B-cell prolymphocytic leukemia/lymphoma, and chronic lymphocytic leukemia/small lymphocytic lymphoma. Correlation with radiology and biopsy/excision of suspicious areas is recommended. Bone marrow biopsy is also recommended, if clinically indicated. This case is seen in consultation with Dr. Shahab Vega, who agrees with the above findings. Immunohistochemical stains are performed from block 1A. CD20 is diffusely positive, indicating a B-cell lymphoproliferative disorder. CD3 marks few background small T-cells. Cyclin D1 and SOX11 stains are negative, making mantle cell lymphoma less likely. EBV in situ hybridization is also negative. Immunohistochemical positive and negative controls stain appropriately. Concurrent flow cytometric studies are reviewed. Appropriate immunohistochemical stains are ordered and evaluated in order to further characterize and/or quantify the process observed by light microscopy Flow Cytometry:  
Diagnosis: Monoclonal B-cells, consistent with a B-cell lymphoproliferative disorder (see comments). Comments: Flow cytometry shows a monoclonal B-cell population (94% of total cells) co-expression CD5 and CD23, consistent with a B-cell lymphoma/leukemia. B-lymphocytes appear to be mostly intermediate to large based on forward light scatter characteristics. The differential diagnosis includes but is not limited to chronic lymphocytic leukemia/small lymphocytic lymphoma, prolymphocytic B-cell leukemia/lymphoma, mantle cell lymphoma, and diffuse large B-cell lymphoma. Please correlate the result with morphologic findings and clinical information Bone marrow biopsy 1/17/2020 Bone marrow, posterior iliac crest, aspirate, clot section, touch imprint, and decalcified core biopsy: Hypercellular bone marrow with maturing trilineage hematopoiesis No morphologic or flow cytometric evidence of B-cell non-Hodgkin lymphoma Flow cytometry shows no diagnostic immunophenotypic abnormalities Assessment:  
1) B cell Lymphoma With bilateral ( L> R) Pleural effusions s/p Pleurx placement PET CT with no other clear site of disease apart from vague Lung consolidative changes Pleural fluid cytology is reviewed with Heme Path Dr. Melissa Ghosh and it is noted that these cells are intermediate to large sized, CD20, CD23, CD5 positive and are lambda light chain restricted Notably EBV negative/ CCND1 negative HIV negative Has anemia, has no leucocytosis BM biopsy negative and no abnormal cells on PB Flow and FISH Differentials include CLL though morphology does not fit, large B cell Lymphoma,  prolymphocytic B-cell leukemia/lymphoma. Mantle cell is ruled out as is primary effusion Lymphoma He has a reasonable PS status despite is age and co morbidities. Unfortunately we don't have a cell block for accurate characterization of this Lymphoma. 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 DLBCL cannot be r/o.  Thank fully there is not much in the way of systemic disease and he is not a candidate for RCHOP. 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 Management is palliative and is guided by disease bulk, moleculars and pace. He certainly has recurrent pleural effusions and hence palliative treatment is indicated I have recommended Rituxan weekly x 4 The family also requests a repeat Cytology on the pleural fluid which I called IR to arrange I have also encouraged them to consider another opinion 2) Bilateral Pleural effusions- Malignant L> R 
S/P L pleurx Continues to drain. 3) Anemia Secondary to CKD and likely malignancy Bm Involvement seems less likely with a negative PET 4) CKD 5) Afib on Eliquis 6) H/O Colon cancer s/p resection 2015 7) Psychosocial 
Supportive Family Plan: · Repeat Pleural Fluid cytology , Flow and FISH · Recommend Rituxan weekly x 4 
· RTC 10 days > 50% of this 40 min visit spent in counseling and care co ordination I appreciate the opportunity to participate in Mr. Linda De La Paz's care.  
 
Signed By: Ke Sky MD

## 2020-01-23 NOTE — PROGRESS NOTES
Mitchel check. 1. Have you been to the ER, urgent care clinic since your last visit? Hospitalized since your last visit? No    2. Have you seen or consulted any other health care providers outside of the [address] [place] [address] since your last visit? Include any pap smears or colon screening.  No

## 2020-01-23 NOTE — PROGRESS NOTES
Subjective:      George Mcpherson is a 80 y.o. male presents for pleurx catheter check. [facility] nurse called this morning stating the patient last night removed the dressing and placed a band aid over the site. He (the nurse) had issues draining the catheter this morning. Only obtained 200 ml and noted pieces of thick \"tissue\" in the tubing. Procedure: Placement of left PleurX catheter. 2.  Intraoperative ultrasound with interpretation for Left malignant pleural effusion & B-cell lymphoma by Dr. Day Brunner on 12/23/2019. Appetite is good. Eating a regular diet without difficulty. Bowel movements are regular. The patient is voiding without difficulty. The patient is not having any pain. .    Objective:     Visit Vitals  /89 (BP 1 Location: Left arm, BP Patient Position: Sitting)   Pulse 62   Temp 98 °F (36.7 °C) (Oral)   Resp 18   Ht 6' 1\" (1.854 m)   Wt 167 lb (75.8 kg)   SpO2 92%   BMI 22.03 kg/m²       Physical Exam:  GENERAL: alert, cooperative, no distress, appears stated age, LUNG: clear to auscultation bilaterally, diminished breath sounds L base, HEART: regular rate and rhythm, ABDOMEN: soft, non-tender. Bowel sounds normal. No masses,  no organomegaly, EXTREMITIES:  extremities normal, atraumatic, no cyanosis or edema, SKIN: Normal     Data Review CXR 01/23/20  Indication: Pleural effusion, pleural catheter.     COMPARISON: 1/7/2020     PA and lateral views demonstrate persistent cardiomegaly.     Left pleural drain remains in place with its tip medially at about the level of  the hilum. Left pleural effusion has increased this has increased at the left  lung base as well as laterally along left chest above the level of the aortic  arch. This causes increasing atelectasis in left lower lobe and lingula.     Small right pleural effusion has also increased to mild degree there is  increasing atelectasis medially in the right lower lobe.     IMPRESSION  IMPRESSION:  1.  Left chest tube remains in place. Left-sided pleural effusion is increasing  Assessment:     Patient Active Problem List   Diagnosis Code    [facility] C18.9    Acute on chronic systolic CHF (congestive heart failure) (HCC) I50.23    SOB (shortness of breath) R06.02    Dyspnea R06.00    Pleural effusion J90    Diffuse large B-cell lymphoma ([facility] C83.30    Malignant lymphoma, undifferentiated cell, non-Burkitt's ([facility] C85.80       Mr Daya Vega comes in accompanied by his daughter and CNA. He is very pleasant and cooperative. PleurX catheter dressing was removed, the site is without erythema or drainage. Catheter was flushed with 10 ml sterile saline, met no resistant and aspirated back easily, did see several (5) small pieces of fibrinous material ~0.5 cm in size. Vacuum bottle attached, obtained only 75 ml straw colored clear fluid. Site was redressed in sterile fashion. Plan/Recommendations/Medical Decision Making:     Per the NCCN guidelines: Ramsey Pickens will be followed routinely with a history and physical and imaging (CT Chest) every 6 months for the first 2 years then yearly. He is currently being work up for B cell Lymphoma by Dr Abner Gutierrez. We will see him back as needed. Recommend continuing to drain him every other day and PRN for SOB. All questions were personally answered. Thank you for allowing us to participate in the care of your patient.     [address]  Thoracic Surgery

## 2020-01-31 NOTE — PROGRESS NOTES
Laura uJarez is a 80 y.o. male Chief Complaint Patient presents with  Follow-up B cell Lymphoma 1. Have you been to the ER, urgent care clinic since your last visit? Hospitalized since your last visit? No 
2. Have you seen or consulted any other health care providers outside of the [address] [place] [address] since your last visit? Include any pap smears or colon screening.  No

## 2020-01-31 NOTE — ED PROVIDER NOTES
80 y.o. male with past medical history significant for HTN, colon cancer, arrhythmia, cancer, and heart failure who presents from home via personal vehicle with chief complaint of SOB. Patient history limited due to poor history from the patient and his caregiver. Per patient's daughter, the patient comes to the ED for increasing SOB. Daughter reports the patient is being treated for fluid build up in his pleural cavity, for which he has drained ~once a week. Daughter reports while typically drainage can range from 100-800cc of fluid, his last appointment within the past two days resutled in only 50cc of fluid drainage. Daughter notes despite the lower fluid drainage, the patient has been experiencing increasing SOB. Patient is being treated by Dr. Renu Collins. Daughter notes the patient was diagnosed with lymphoma based on cytology of his fluid. There are no other acute medical concerns at this time. Social hx: Former smoker PCP: Juanpablo Ren,  Full history, physical exam, and ROS unable to be obtained due to:  Confusion, poor historian. Note written by Evangelista Campbell, as dictated by Catherine Hu MD 2:50 PM  
 
 
The history is provided by a relative, a caregiver and the patient. The history is limited by the condition of the patient. No  was used. Past Medical History:  
Diagnosis Date  Arrhythmia   
 irrigular heart beat- A FIB  Cancer [facility]) 2013 COLON   
 Cancer ([facility] SKIN  
 Colon cancer ([facility] 7/1/2013  Heart failure ([facility]  Hypertension Past Surgical History:  
Procedure Laterality Date  HX OTHER SURGICAL    
 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  
 HX TONSIL AND ADENOIDECTOMY AS CHILD Family History:  
Problem Relation Age of Onset  Colon Cancer Mother  Lung Cancer Father  Cancer Brother UNKNOWN Social History Socioeconomic History  Marital status:   
 Spouse name: Not on file  Number of children: Not on file  Years of education: Not on file  Highest education level: Not on file Occupational History  Not on file Social Needs  Financial resource strain: Not on file  Food insecurity:  
  Worry: Not on file Inability: Not on file  Transportation needs:  
  Medical: Not on file Non-medical: Not on file Tobacco Use  Smoking status: Former Smoker Packs/day: 0.50 Years: 32.00 Pack years: 16.00 Types: Cigarettes Last attempt to quit: 1983 Years since quittin.6  Smokeless tobacco: Never Used Substance and Sexual Activity  Alcohol use: Yes Comment: edie/gin 7 per week  Drug use: No  
 Sexual activity: Not on file Lifestyle  Physical activity:  
  Days per week: Not on file Minutes per session: Not on file  Stress: Not on file Relationships  Social connections:  
  Talks on phone: Not on file Gets together: Not on file Attends [employer] service: Not on file Active member of club or organization: Not on file Attends meetings of clubs or organizations: Not on file Relationship status: Not on file  Intimate partner violence:  
  Fear of current or ex partner: Not on file Emotionally abused: Not on file Physically abused: Not on file Forced sexual activity: Not on file Other Topics Concern  Not on file Social History Narrative  Not on file ALLERGIES: Patient has no known allergies. Review of Systems Unable to perform ROS: Other Respiratory: Positive for shortness of breath. Vitals:  
 20 1151 BP: 191/80 Pulse: 64 Resp: 18 Temp: 97.6 °F (36.4 °C) SpO2: 95% Weight: 75.8 kg (167 lb) Height: 6' 1\" (1.854 m) Physical Exam 
Vitals signs and nursing note reviewed. Constitutional:   
   Appearance: He is well-developed. HENT:  
   Head: Normocephalic and atraumatic. Eyes: Pupils: Pupils are equal, round, and reactive to light. Neck: Musculoskeletal: Normal range of motion and neck supple. Cardiovascular:  
   Rate and Rhythm: Normal rate. Rhythm irregular. Heart sounds: Normal heart sounds. No murmur. No friction rub. No gallop. Pulmonary:  
   Effort: Pulmonary effort is normal. No respiratory distress. Breath sounds: No wheezing or rales. Comments: Decreased breath sounds, greater in left than right base. Abdominal:  
   Tenderness: There is no abdominal tenderness. There is no rebound. Comments: Protuberant, soft abdomen. Musculoskeletal: Normal range of motion. General: No tenderness. Comments: 1/4 pitting edema. Skin: 
   Findings: No erythema. Neurological:  
   Mental Status: He is alert. Cranial Nerves: No cranial nerve deficit. Comments: Motor; symmetric Psychiatric:     
   Behavior: Behavior normal.  
 
 Note written by Evangelista Hamilton, as dictated by Karime Powers MD 2:50 PM  
 
 
MDM Procedures PROGRESS NOTE: 
3:43 PM 
Patient's left pleural effusion is significantly larger than right. Dr. Chico Talbert (thoracic surgery) and Dr. Silvina Holland (oncology) are both being consulted. CONSULT NOTE: 
4:08 PM Karime Powers MD spoke with Dr. Silvina Holland, Consult for Oncology. Discussed available diagnostic tests and clinical findings.

## 2020-01-31 NOTE — ED TRIAGE NOTES
Patient comes to the ER from Dr Birch's office for SOB. Patient reports SOB x2 months increasing within the past week.

## 2020-01-31 NOTE — CONSULTS
[facility] [place] [facility] [place] [address] [address], [address] W: [phone]  F: [phone] Reason for Visit:  
Hedy Ramsay is a 80 y.o. male who is seen  For B cell Lymphoma- NOS and pleural effusions Treatment History:  
· 12/4/19: CT chest : Bilateral pleural effusions, smaller right (decreased), and very large on the left with left hemidiaphragmatic inversion (increased.) · 12/5/19: Thoracentesis B cell Lymphoma · 12/27/19: PET CT showed LLL consolidative airspace disease is likely neoplastic, Bibasilar pleural rinds are likely neoplastic · 1/17/2020: BM biopsy- no evidence of Lymphoma. Peripheral blood - no abnormal cells and FISH negative History of Present Illness:  
Patient is a 80 y.o. male with multiple co morbidities as below specifically CKD, CHF , personal h/o colon cancer s/o hemicolectomy 2015 and Afib on Eliquis is seen for B cell Lymphoma He has had recurrent pleural effusions and was admitted 12/4/19-12/9/19 and had a left sided thoracentesis. Cytology of his pleural fluid was consistent with B-cell lymphoma. He had a PET scan scheduled and was to be seen by medical oncology but presented to the ER with SOB on 12/23/19. Was found to have recurrent L sided effusion and had a L sided PleurX cath placed by Dr. Damian Angulo. BM biopsy negative, Had a repeat cytology done which was negative . He had 930 cc op after I saw him last. He saw Thoracic 1/23/2020, had a CXR that the effusion was increasing. Tube was in place. He has only had 100 cc or less in the last week and he is very SOB. He has had no fevers, chills, sweats, weight or appetite changes. He feels terrible. Sent to the ER He drinks wine He quit smoking Fh OF Leukemia and colon cancer Past Medical History:  
Diagnosis Date  Arrhythmia   
 irrigular heart beat- A FIB  Cancer [facility]) 2013 COLON   
 Cancer ([facility] [address] SKIN  
 Colon cancer ([facility] [address] 7/1/2013  Heart failure ([facility] [address]  Hypertension Past Surgical History:  
Procedure Laterality Date  HX OTHER SURGICAL    
 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  
 HX TONSIL AND ADENOIDECTOMY AS CHILD Social History Tobacco Use  Smoking status: Former Smoker Packs/day: 0.50 Years: 32.00 Pack years: 16.00 Types: Cigarettes Last attempt to quit: 1983 Years since quittin.6  Smokeless tobacco: Never Used Substance Use Topics  Alcohol use: Yes Comment: edie/gin 7 per week Family History Problem Relation Age of Onset  Colon Cancer Mother  Lung Cancer Father  Cancer Brother UNKNOWN Current Facility-Administered Medications Medication Dose Route Frequency  alteplase (CATHFLO) 10 mg in sterile water (preservative free) 10 mL injection  10 mg InterCATHeter ONCE Current Outpatient Medications Medication Sig  
 hydrALAZINE (APRESOLINE) 25 mg tablet Take 25 mg by mouth as needed. If Blood Pressure is 130 Systolic or higher  furosemide (LASIX) 40 mg tablet Take 40 mg by mouth two (2) times a day.  loperamide (IMODIUM) 2 mg capsule Take 2 mg by mouth daily as needed.  potassium chloride SR (KLOR-CON 10) 10 mEq tablet Take 20 mEq by mouth daily.  guaiFENesin-dextromethorphan (ROBAFEN DM COUGH)  mg/5 mL liqd Take 30 mL by mouth every four (4) hours as needed.  diphenhydrAMINE (BENADRYL ALLERGY) 25 mg tablet Take 12.5 mg by mouth nightly as needed for Sleep.  apixaban (ELIQUIS) 2.5 mg tablet Take 2.5 mg by mouth two (2) times a day. Indications: Atrial Fibrillation  ferrous sulfate 325 mg (65 mg iron) tablet Take 325 mg by mouth two (2) times a day.  levothyroxine (SYNTHROID) 175 mcg tablet Take 175 mcg by mouth Daily (before breakfast).  melatonin 10 mg tab Take 1 Tab by mouth nightly.  omeprazole (PRILOSEC) 20 mg capsule Take 20 mg by mouth two (2) times a day.  thiamine HCL (B-1) 100 mg tablet Take 100 mg by mouth daily.  triamcinolone acetonide (KENALOG) 0.1 % topical cream Apply  to affected area two (2) times daily as needed for Skin Irritation or Itching. use thin layer  cholecalciferol, vitamin D3, (VITAMIN D3) 2,000 unit tab Take 1 Tab by mouth daily.  dutasteride (AVODART) 0.5 mg capsule Take 0.5 mg by mouth daily.  terazosin (HYTRIN) 10 mg capsule Take 10 mg by mouth daily. Indications: high blood pressure No Known Allergies Review of Systems: A complete review of systems was obtained, negative except as described above. Physical Exam:  
 
Visit Vitals BP (!) 151/101 Pulse (!) 56 Temp 97.6 °F (36.4 °C) Resp 12 Ht 6' 1\" (1.854 m) Wt 167 lb (75.8 kg) SpO2 96% BMI 22.03 kg/m² ECOG PS: 2 General: Pursing lips , working hard to breath Eyes: PERRLA, anicteric sclerae HENT: Atraumatic, OP clear CV: Normal rate, no peripheral edema Skin: Multiple ecchymoses, or petechiae. Normal temperature, turgor, and texture. Psych: Alert, oriented, appropriate affect, normal judgment/insight Results:  
 
Lab Results Component Value Date/Time WBC 13.2 (H) 01/31/2020 12:12 PM  
 HGB 9.7 (L) 01/31/2020 12:12 PM  
 HCT 32.3 (L) 01/31/2020 12:12 PM  
 PLATELET 981 78/95/6469 12:12 PM  
 MCV 99.7 (H) 01/31/2020 12:12 PM  
 ABS. NEUTROPHILS 11.8 (H) 01/31/2020 12:12 PM  
 
Lab Results Component Value Date/Time Sodium 140 01/31/2020 12:12 PM  
 Potassium 4.2 01/31/2020 12:12 PM  
 Chloride 107 01/31/2020 12:12 PM  
 CO2 31 01/31/2020 12:12 PM  
 Glucose 163 (H) 01/31/2020 12:12 PM  
 BUN 37 (H) 01/31/2020 12:12 PM  
 Creatinine 1.42 (H) 01/31/2020 12:12 PM  
 GFR est AA 56 (L) 01/31/2020 12:12 PM  
 GFR est non-AA 46 (L) 01/31/2020 12:12 PM  
 Calcium 8.9 01/31/2020 12:12 PM  
 Glucose (POC) 148 (H) 07/11/2013 09:36 PM  
 
Lab Results Component Value Date/Time  Bilirubin, total 0.3 01/31/2020 12:12 PM  
 ALT (SGPT) 21 01/31/2020 12:12 PM  
 AST (SGOT) 22 01/31/2020 12:12 PM  
 Alk. phosphatase 103 01/31/2020 12:12 PM  
 Protein, total 7.0 01/31/2020 12:12 PM  
 Albumin 2.3 (L) 01/31/2020 12:12 PM  
 Globulin 4.7 (H) 01/31/2020 12:12 PM  
 
Lab Results Component Value Date/Time Ferritin 132 07/26/2019 06:03 AM  
 Vitamin B12 558 07/26/2019 06:03 AM  
 Folate 9.8 07/26/2019 06:03 AM  
  (H) 12/17/2019 02:15 PM  
 TSH 4.08 (H) 12/23/2019 09:45 AM  
 M-Deion Not Observed 07/27/2019 03:27 AM  
 HIV SCREEN 4TH GENERATION WRFX Non Reactive 12/17/2019 02:15 PM  
 
Lab Results Component Value Date/Time INR 1.1 12/23/2019 09:45 AM  
 aPTT 33.3 (H) 12/23/2019 09:45 AM  
 
 
Records reviewed and summarized above. Pathology report(s) reviewed PLeural fluid cytology CYTOLOGIC INTERPRETATION:  
Pleural Fluid, ThinPrep and cell block:  
B-cell lymphoma, see comment General Categorization Positive for malignancy. Specimen Adequacy Satisfactory for evaluation. CPT: [id number], [id number], [id number], [id number], [id number], [id number] Comment Overall findings are diagnostic of a B-cell lymphoma. The differential diagnosis includes diffuse large B-cell lymphoma, B-cell prolymphocytic leukemia/lymphoma, and chronic lymphocytic leukemia/small lymphocytic lymphoma. Correlation with radiology and biopsy/excision of suspicious areas is recommended. Bone marrow biopsy is also recommended, if clinically indicated. This case is seen in consultation with Dr. Alonzo Grayson, who agrees with the above findings. Immunohistochemical stains are performed from block 1A. CD20 is diffusely positive, indicating a B-cell lymphoproliferative disorder. CD3 marks few background small T-cells. Cyclin D1 and SOX11 stains are negative, making mantle cell lymphoma less likely. EBV in situ hybridization is also negative. Immunohistochemical positive and negative controls stain appropriately. Concurrent flow cytometric studies are reviewed. Appropriate immunohistochemical stains are ordered and evaluated in order to further characterize and/or quantify the process observed by light microscopy Flow Cytometry:  
Diagnosis: Monoclonal B-cells, consistent with a B-cell lymphoproliferative disorder (see comments). Comments: Flow cytometry shows a monoclonal B-cell population (94% of total cells) co-expression CD5 and CD23, consistent with a B-cell lymphoma/leukemia. B-lymphocytes appear to be mostly intermediate to large based on forward light scatter characteristics. The differential diagnosis includes but is not limited to chronic lymphocytic leukemia/small lymphocytic lymphoma, prolymphocytic B-cell leukemia/lymphoma, mantle cell lymphoma, and diffuse large B-cell lymphoma. Please correlate the result with morphologic findings and clinical information Bone marrow biopsy 1/17/2020 Bone marrow, posterior iliac crest, aspirate, clot section, touch imprint, and decalcified core biopsy: Hypercellular bone marrow with maturing trilineage hematopoiesis No morphologic or flow cytometric evidence of B-cell non-Hodgkin lymphoma Flow cytometry shows no diagnostic immunophenotypic abnormalities Chest X ray 1/28/2020 done outside Showed bilateral pulmonary edema' Assessment:  
1) B cell Lymphoma- NOS- Stage IE With bilateral ( L> R) Pleural effusions s/p Pleurx placement PET CT with no other clear site of disease apart from vague Lung consolidative changes Pleural fluid cytology is reviewed with Heme Path Dr. Harry Luevano and it is noted that these cells are intermediate to large sized, CD20, CD23, CD5 positive and are lambda light chain restricted Notably EBV negative/ CCND1 negative HIV negative Has anemia, has no leucocytosis BM biopsy negative and no abnormal cells on PB Flow and FISH Differentials include CLL though morphology does not fit, large B cell Lymphoma,  prolymphocytic B-cell leukemia/lymphoma. Mantle cell is ruled out as is primary effusion Lymphoma He has a reasonable PS status despite is age and co morbidities. Unfortunately we don't have a cell block for accurate characterization of this Lymphoma. 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 DLBCL cannot be r/o. Thank fully there is not much in the way of systemic disease and he is not a candidate for RCHOP. 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 Management is palliative and is guided by disease bulk, moleculars and pace. He certainly has recurrent pleural effusions and hence palliative treatment is indicated I have recommended Rituxan weekly x 4 Repeat cytology 1/23/2020 was non diagnostic 2) Bilateral Pleural effusions- Malignant L> R 
S/P L pleurx Now with marked SOB likely from pulmonary edema CT chest ordered CXR suggests worsening effusion Consulted Thoracic surgery- Does chest tube need replacement? Is he a candidate for pleurodesis 3) Anemia Secondary to CKD and likely malignancy Bm Involvement seems less likely with a negative PET 4) CKD 5) Afib on Eliquis 6) H/O Colon cancer s/p resection 2015 Plan: · Agree with admission · Consulted thoracic surgery- question? Does chest tube need replacement? Is he a candidate for pleurodesis? · Recommend Rituxan weekly x 4- outpatient I appreciate the opportunity to participate in Mr. Magalie De La Paz's care.  
 
Signed By: Rosita Chavez MD

## 2020-01-31 NOTE — CONSULTS
Thoracic Surgery ER Consultation Admit Date: 1/31/2020 Reason for Consultation: Increased Left Pleural effusion with Left PleurX HPI: 
Mg Fernández is a 80 y.o. male well know to our service who we are asked to see in Thoracic Surgical consultation for increased left pleural effusion with Left PleurX catheter in place. He is 5 weeks s/p Placement of left PleurX catheter & Intraoperative ultrasound with interpretation for Left malignant pleural effusion & B-cell lymphoma by Dr. Doroteo Denson on 12/23/2019. He presents to [facility] ED this morning from Dr. Vernon Gomez office with increased shortness of breath. She suspected pulmonary edema and requested a Chest CT without Contrast.  
His left PleurX has been draining approx 50ml-100ml every other day. Daughters report that the drain has not been able to drain a large amount since fluid was aspirated for cytology on 1/21/2020. Two daughers & aide are present at bedside this afternoon. Chest CT without Contrast (1/31/2020): IMPRESSION: 
1. Large left pleural effusion and moderate right pleural effusion, both 
increased in size since the most recent prior CT 12/7/2019. 
2. Left chest tube introduced as described, with no pneumothorax. 3. Bibasilar atelectasis or infiltrate. No other parenchymal abnormality 
confirmed without contrast. 
4. Enlarged main pulmonary artery. Correlate for pulmonary hypertension. 5. Ascending aortic aneurysm. 6. Hypodense left hepatic lobe mass 4.1 x 2.4 cm, measuring slightly greater 
than water. This may represent a complicated cyst, but is stable since July 2019. 
7. Other incidentals. CXR (1/31/2020) shows: 
IMPRESSION: 
1. Significant increase in left pleural effusion since prior study. 2. Stable right pleural effusion and bibasilar infiltrate or atelectasis. Lizett Gacarson Patient Active Problem List  
 Diagnosis Date Noted  Malignant lymphoma, undifferentiated cell, non-Burkitt's ([facility] [address] 01/21/2020  Diffuse large B-cell lymphoma ([facility] 2020  Dyspnea 2019  Pleural effusion 2019  
 SOB (shortness of breath) 2019  Acute on chronic systolic CHF (congestive heart failure) ([facility] 2019  Colon cancer ([facility] 2013 Past Medical History:  
Diagnosis Date  Arrhythmia   
 irrigular heart beat- A FIB  Cancer [facility])  COLON   
 Cancer ([facility] SKIN  
 Colon cancer ([facility] 2013  Heart failure ([facility]  Hypertension Past Surgical History:  
Procedure Laterality Date  HX OTHER SURGICAL    
 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  
 HX TONSIL AND ADENOIDECTOMY AS CHILD Social History Tobacco Use  Smoking status: Former Smoker Packs/day: 0.50 Years: 32.00 Pack years: 16.00 Types: Cigarettes Last attempt to quit: 1983 Years since quittin.6  Smokeless tobacco: Never Used Substance Use Topics  Alcohol use: Yes Comment: edie/gin 7 per week Family History Problem Relation Age of Onset  Colon Cancer Mother  Lung Cancer Father  Cancer Brother UNKNOWN  
  
Prior to Admission medications Medication Sig Start Date End Date Taking? Authorizing Provider  
hydrALAZINE (APRESOLINE) 25 mg tablet Take 25 mg by mouth as needed. If Blood Pressure is 347 Systolic or higher    Provider, Historical  
furosemide (LASIX) 40 mg tablet Take 40 mg by mouth two (2) times a day. Provider, Historical  
loperamide (IMODIUM) 2 mg capsule Take 2 mg by mouth daily as needed. Provider, Historical  
potassium chloride SR (KLOR-CON 10) 10 mEq tablet Take 20 mEq by mouth daily. Provider, Historical  
guaiFENesin-dextromethorphan (ROBAFEN DM COUGH)  mg/5 mL liqd Take 30 mL by mouth every four (4) hours as needed. Provider, Historical  
diphenhydrAMINE (BENADRYL ALLERGY) 25 mg tablet Take 12.5 mg by mouth nightly as needed for Sleep.     Provider, Historical  
 apixaban (ELIQUIS) 2.5 mg tablet Take 2.5 mg by mouth two (2) times a day. Indications: Atrial Fibrillation    Provider, Historical  
ferrous sulfate 325 mg (65 mg iron) tablet Take 325 mg by mouth two (2) times a day. Provider, Historical  
levothyroxine (SYNTHROID) 175 mcg tablet Take 175 mcg by mouth Daily (before breakfast). Provider, Historical  
melatonin 10 mg tab Take 1 Tab by mouth nightly. Provider, Historical  
omeprazole (PRILOSEC) 20 mg capsule Take 20 mg by mouth two (2) times a day. Provider, Historical  
thiamine HCL (B-1) 100 mg tablet Take 100 mg by mouth daily. Provider, Historical  
triamcinolone acetonide (KENALOG) 0.1 % topical cream Apply  to affected area two (2) times daily as needed for Skin Irritation or Itching. use thin layer    Provider, Historical  
cholecalciferol, vitamin D3, (VITAMIN D3) 2,000 unit tab Take 1 Tab by mouth daily. Provider, Historical  
dutasteride (AVODART) 0.5 mg capsule Take 0.5 mg by mouth daily. Provider, Historical  
terazosin (HYTRIN) 10 mg capsule Take 10 mg by mouth daily. Indications: high blood pressure    Provider, Historical  
 
No Known Allergies Subjective:  
 
Review of Systems: A comprehensive review of systems was negative except for that written in the History of Present Illness. Objective:  
 
Blood pressure (!) 151/101, pulse (!) 56, temperature 97.6 °F (36.4 °C), resp. rate 12, height 6' 1\" (1.854 m), weight 167 lb (75.8 kg), SpO2 96 %. Recent Results (from the past 24 hour(s)) EKG, 12 LEAD, INITIAL Collection Time: 01/31/20 12:07 PM  
Result Value Ref Range Ventricular Rate 62 BPM  
 Atrial Rate 47 BPM  
 QRS Duration 74 ms Q-T Interval 424 ms QTC Calculation (Bezet) 430 ms Calculated R Axis -54 degrees Calculated T Axis 86 degrees Diagnosis Atrial fibrillation Left axis deviation Anteroseptal infarct (cited on or before 25-JUL-2019) When compared with ECG of 23-DEC-2019 09:39, 
Sinus rhythm is no longer with ventricular escape complexes T wave inversion less evident in Lateral leads QT has shortened Confirmed by Jagruti Rasmussen ([id number]) on 1/31/2020 1:14:32 PM 
  
CBC WITH AUTOMATED DIFF Collection Time: 01/31/20 12:12 PM  
Result Value Ref Range WBC 13.2 (H) 4.1 - 11.1 K/uL  
 RBC 3.24 (L) 4.10 - 5.70 M/uL HGB 9.7 (L) 12.1 - 17.0 g/dL HCT 32.3 (L) 36.6 - 50.3 % MCV 99.7 (H) 80.0 - 99.0 FL  
 MCH 29.9 26.0 - 34.0 PG  
 MCHC 30.0 30.0 - 36.5 g/dL  
 RDW 14.9 (H) 11.5 - 14.5 % PLATELET 795 051 - 617 K/uL MPV 8.9 8.9 - 12.9 FL  
 NRBC 0.0 0  WBC ABSOLUTE NRBC 0.00 0.00 - 0.01 K/uL NEUTROPHILS 89 (H) 32 - 75 % LYMPHOCYTES 4 (L) 12 - 49 % MONOCYTES 5 5 - 13 % EOSINOPHILS 1 0 - 7 % BASOPHILS 0 0 - 1 % IMMATURE GRANULOCYTES 1 (H) 0.0 - 0.5 % ABS. NEUTROPHILS 11.8 (H) 1.8 - 8.0 K/UL  
 ABS. LYMPHOCYTES 0.5 (L) 0.8 - 3.5 K/UL  
 ABS. MONOCYTES 0.7 0.0 - 1.0 K/UL  
 ABS. EOSINOPHILS 0.1 0.0 - 0.4 K/UL  
 ABS. BASOPHILS 0.0 0.0 - 0.1 K/UL  
 ABS. IMM. GRANS. 0.1 (H) 0.00 - 0.04 K/UL  
 DF SMEAR SCANNED    
 RBC COMMENTS MACROCYTOSIS 1+ 
    
 RBC COMMENTS ANISOCYTOSIS 1+ 
    
 RBC COMMENTS HYPOCHROMIA 1+ METABOLIC PANEL, COMPREHENSIVE Collection Time: 01/31/20 12:12 PM  
Result Value Ref Range Sodium 140 136 - 145 mmol/L Potassium 4.2 3.5 - 5.1 mmol/L Chloride 107 97 - 108 mmol/L  
 CO2 31 21 - 32 mmol/L Anion gap 2 (L) 5 - 15 mmol/L Glucose 163 (H) 65 - 100 mg/dL BUN 37 (H) 6 - 20 MG/DL Creatinine 1.42 (H) 0.70 - 1.30 MG/DL  
 BUN/Creatinine ratio 26 (H) 12 - 20 GFR est AA 56 (L) >60 ml/min/1.73m2 GFR est non-AA 46 (L) >60 ml/min/1.73m2 Calcium 8.9 8.5 - 10.1 MG/DL Bilirubin, total 0.3 0.2 - 1.0 MG/DL  
 ALT (SGPT) 21 12 - 78 U/L  
 AST (SGOT) 22 15 - 37 U/L Alk. phosphatase 103 45 - 117 U/L Protein, total 7.0 6.4 - 8.2 g/dL Albumin 2.3 (L) 3.5 - 5.0 g/dL Globulin 4.7 (H) 2.0 - 4.0 g/dL A-G Ratio 0.5 (L) 1.1 - 2.2 SAMPLES BEING HELD Collection Time: 01/31/20 12:12 PM  
Result Value Ref Range SAMPLES BEING HELD 1BLU 1RED COMMENT Add-on orders for these samples will be processed based on acceptable specimen integrity and analyte stability, which may vary by analyte.  
 
_____________________ Physical Exam:  
 
General:  Alert, cooperative, no distress, appears stated age. Eyes:   Sclera clear. Throat: Lips, mucosa, and tongue normal.  
Neck: Supple, symmetrical, trachea midline. Lungs:   Dec breath sounds bilat at bases. L PleurX catheter covereded w tegaderm & clean, dry dressing. No resp distess. O2 sat 97% on RA Heart:  Regular rate and rhythm. Abdomen:   Soft, flat, non-tender. Extremities: Extremities normal, atraumatic, no cyanosis or edema. Skin: W/d/i. Assessment:  
Active Problems: 
  SOB (shortness of breath) (12/4/2019) Dyspnea (12/23/2019) Pleural effusion (12/23/2019) Plan:  
 
I attempted drainage of Left PleurX--only able to obtain 50ml of cloudy tan fluid. Plan to instill Alteplase 10mg/30ml now then drain after 1 hour. Patient tolerated procedure well Will also obtain Chest CT without contrast. 
 
Thank you for including us in the care of this patient Signed By: DEBRA Avery   
 January 31, 2020

## 2020-01-31 NOTE — PROGRESS NOTES
[facility] [place] [facility] [place] [address] [address], [address] W: [phone]  F: [phone] Reason for Visit:  
Kristen Wilkerson is a 80 y.o. male who is seen  B cell Lymphoma- NOS Treatment History:  
· 12/4/19: CT chest : Bilateral pleural effusions, smaller right (decreased), and very large on the left with left hemidiaphragmatic inversion (increased.) · 12/5/19: Thoracentesis B cell Lymphoma · 12/27/19: PET CT showed LLL consolidative airspace disease is likely neoplastic, Bibasilar pleural rinds are likely neoplastic · 1/17/2020: BM biopsy- no evidence of Lymphoma. Peripheral blood - no abnormal cells and FISH negative History of Present Illness:  
Patient is a 80 y.o. male with multiple co morbidities as below specifically CKD, CHF , personal h/o colon cancer s/o hemicolectomy 2015 and Afib on Eliquis is seen for B cell Lymphoma He has had recurrent pleural effusions and was admitted 12/4/19-12/9/19 and had a left sided thoracentesis. Cytology of his pleural fluid was consistent with B-cell lymphoma. He had a PET scan scheduled and was to be seen by medical oncology but presented to the ER with SOB on 12/23/19. Was found to have recurrent L sided effusion and had a L sided PleurX cath placed by Dr. Rachel Vaughan. BM biopsy negative, Had a repeat cytology done which was negative . He had 930 cc op after I saw him last. He saw Thoracic 1/23/2020, had a CXR that the effusion was increasing. Tube was in place. He has only had 100 cc or less in the last week and he is very SOB. He has had no fevers, chills, sweats, weight or appetite changes. He feels terrible He drinks wine He quit smoking Fh OF Leukemia and colon cancer Past Medical History:  
Diagnosis Date  Arrhythmia   
 irrigular heart beat- A FIB  Cancer [facility]) 2013 COLON   
 Cancer ([facility] [address] SKIN  
 Colon cancer ([facility] [address] 7/1/2013  Heart failure ([address]  Hypertension Past Surgical History:  
Procedure Laterality Date  HX OTHER SURGICAL    
 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  
 HX TONSIL AND ADENOIDECTOMY AS CHILD Social History Tobacco Use  Smoking status: Former Smoker Packs/day: 0.50 Years: 32.00 Pack years: 16.00 Types: Cigarettes Last attempt to quit: 1983 Years since quittin.6  Smokeless tobacco: Never Used Substance Use Topics  Alcohol use: Yes Comment: edie/gin 7 per week Family History Problem Relation Age of Onset  Colon Cancer Mother  Lung Cancer Father  Cancer Brother UNKNOWN Current Outpatient Medications Medication Sig  
 hydrALAZINE (APRESOLINE) 25 mg tablet Take 25 mg by mouth as needed. If Blood Pressure is 667 Systolic or higher  furosemide (LASIX) 40 mg tablet Take 40 mg by mouth two (2) times a day.  loperamide (IMODIUM) 2 mg capsule Take 2 mg by mouth daily as needed.  potassium chloride SR (KLOR-CON 10) 10 mEq tablet Take 20 mEq by mouth daily.  guaiFENesin-dextromethorphan (ROBAFEN DM COUGH)  mg/5 mL liqd Take 30 mL by mouth every four (4) hours as needed.  diphenhydrAMINE (BENADRYL ALLERGY) 25 mg tablet Take 12.5 mg by mouth nightly as needed for Sleep.  apixaban (ELIQUIS) 2.5 mg tablet Take 2.5 mg by mouth two (2) times a day. Indications: Atrial Fibrillation  ferrous sulfate 325 mg (65 mg iron) tablet Take 325 mg by mouth two (2) times a day.  levothyroxine (SYNTHROID) 175 mcg tablet Take 175 mcg by mouth Daily (before breakfast).  melatonin 10 mg tab Take 1 Tab by mouth nightly.  omeprazole (PRILOSEC) 20 mg capsule Take 20 mg by mouth two (2) times a day.  thiamine HCL (B-1) 100 mg tablet Take 100 mg by mouth daily.  triamcinolone acetonide (KENALOG) 0.1 % topical cream Apply  to affected area two (2) times daily as needed for Skin Irritation or Itching. use thin layer  cholecalciferol, vitamin D3, (VITAMIN D3) 2,000 unit tab Take 1 Tab by mouth daily.  dutasteride (AVODART) 0.5 mg capsule Take 0.5 mg by mouth daily.  terazosin (HYTRIN) 10 mg capsule Take 10 mg by mouth daily. Indications: high blood pressure No current facility-administered medications for this visit. No Known Allergies Review of Systems: A complete review of systems was obtained, negative except as described above. Physical Exam:  
 
Visit Vitals /82 (BP 1 Location: Left arm) Pulse 68 Temp 97.9 °F (36.6 °C) Ht 6' 1\" (1.854 m) Wt 167 lb (75.8 kg) SpO2 92% BMI 22.03 kg/m² ECOG PS: 2 General: Pursing lips , working hard to breath Eyes: PERRLA, anicteric sclerae HENT: Atraumatic, OP clear CV: Normal rate, no peripheral edema Skin: Multiple ecchymoses, or petechiae. Normal temperature, turgor, and texture. Psych: Alert, oriented, appropriate affect, normal judgment/insight Results:  
 
Lab Results Component Value Date/Time WBC 3.9 (L) 01/17/2020 09:46 AM  
 HGB 9.1 (L) 01/17/2020 09:46 AM  
 HCT 30.2 (L) 01/17/2020 09:46 AM  
 PLATELET 185 (L) 82/65/6299 09:46 AM  
 .7 (H) 01/17/2020 09:46 AM  
 ABS. NEUTROPHILS 3.0 01/17/2020 09:46 AM  
 
Lab Results Component Value Date/Time Sodium 146 (H) 12/23/2019 09:45 AM  
 Potassium 4.3 12/23/2019 09:45 AM  
 Chloride 112 (H) 12/23/2019 09:45 AM  
 CO2 28 12/23/2019 09:45 AM  
 Glucose 120 (H) 12/23/2019 09:45 AM  
 BUN 55 (H) 12/23/2019 09:45 AM  
 Creatinine 2.35 (H) 12/23/2019 09:45 AM  
 GFR est AA 32 (L) 12/23/2019 09:45 AM  
 GFR est non-AA 26 (L) 12/23/2019 09:45 AM  
 Calcium 8.9 12/23/2019 09:45 AM  
 Glucose (POC) 148 (H) 07/11/2013 09:36 PM  
 
Lab Results Component Value Date/Time Bilirubin, total 0.5 12/23/2019 09:45 AM  
 ALT (SGPT) 29 12/23/2019 09:45 AM  
 AST (SGOT) 31 12/23/2019 09:45 AM  
 Alk.  phosphatase 75 12/23/2019 09:45 AM  
 Protein, total 6.8 12/23/2019 09:45 AM  
 Albumin 3.1 (L) 12/23/2019 09:45 AM  
 Globulin 3.7 12/23/2019 09:45 AM  
 
Lab Results Component Value Date/Time Ferritin 132 07/26/2019 06:03 AM  
 Vitamin B12 558 07/26/2019 06:03 AM  
 Folate 9.8 07/26/2019 06:03 AM  
  (H) 12/17/2019 02:15 PM  
 TSH 4.08 (H) 12/23/2019 09:45 AM  
 M-Deion Not Observed 07/27/2019 03:27 AM  
 HIV SCREEN 4TH GENERATION WRFX Non Reactive 12/17/2019 02:15 PM  
 
Lab Results Component Value Date/Time INR 1.1 12/23/2019 09:45 AM  
 aPTT 33.3 (H) 12/23/2019 09:45 AM  
 
 
Records reviewed and summarized above. Pathology report(s) reviewed PLeural fluid cytology CYTOLOGIC INTERPRETATION:  
Pleural Fluid, ThinPrep and cell block:  
B-cell lymphoma, see comment General Categorization Positive for malignancy. Specimen Adequacy Satisfactory for evaluation. CPT: [id number], [id number], [id number], [id number], [id number], [id number] Comment Overall findings are diagnostic of a B-cell lymphoma. The differential diagnosis includes diffuse large B-cell lymphoma, B-cell prolymphocytic leukemia/lymphoma, and chronic lymphocytic leukemia/small lymphocytic lymphoma. Correlation with radiology and biopsy/excision of suspicious areas is recommended. Bone marrow biopsy is also recommended, if clinically indicated. This case is seen in consultation with Dr. Curtis Whitman, who agrees with the above findings. Immunohistochemical stains are performed from block 1A. CD20 is diffusely positive, indicating a B-cell lymphoproliferative disorder. CD3 marks few background small T-cells. Cyclin D1 and SOX11 stains are negative, making mantle cell lymphoma less likely. EBV in situ hybridization is also negative. Immunohistochemical positive and negative controls stain appropriately. Concurrent flow cytometric studies are reviewed. Appropriate immunohistochemical stains are ordered and evaluated in order to further characterize and/or quantify the process observed by light microscopy Flow Cytometry:  
Diagnosis: Monoclonal B-cells, consistent with a B-cell lymphoproliferative disorder (see comments). Comments: Flow cytometry shows a monoclonal B-cell population (94% of total cells) co-expression CD5 and CD23, consistent with a B-cell lymphoma/leukemia. B-lymphocytes appear to be mostly intermediate to large based on forward light scatter characteristics. The differential diagnosis includes but is not limited to chronic lymphocytic leukemia/small lymphocytic lymphoma, prolymphocytic B-cell leukemia/lymphoma, mantle cell lymphoma, and diffuse large B-cell lymphoma. Please correlate the result with morphologic findings and clinical information Bone marrow biopsy 1/17/2020 Bone marrow, posterior iliac crest, aspirate, clot section, touch imprint, and decalcified core biopsy: Hypercellular bone marrow with maturing trilineage hematopoiesis No morphologic or flow cytometric evidence of B-cell non-Hodgkin lymphoma Flow cytometry shows no diagnostic immunophenotypic abnormalities Chest X ray 1/28/2020 done outside Showed bilateral pulmonary edema' Assessment:  
1) B cell Lymphoma With bilateral ( L> R) Pleural effusions s/p Pleurx placement PET CT with no other clear site of disease apart from vague Lung consolidative changes Pleural fluid cytology is reviewed with Heme Path Dr. Neeta Solitario and it is noted that these cells are intermediate to large sized, CD20, CD23, CD5 positive and are lambda light chain restricted Notably EBV negative/ CCND1 negative HIV negative Has anemia, has no leucocytosis BM biopsy negative and no abnormal cells on PB Flow and FISH Differentials include CLL though morphology does not fit, large B cell Lymphoma,  prolymphocytic B-cell leukemia/lymphoma. Mantle cell is ruled out as is primary effusion Lymphoma He has a reasonable PS status despite is age and co morbidities. Unfortunately we don't have a cell block for accurate characterization of this Lymphoma. 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 DLBCL cannot be r/o. Thank fully there is not much in the way of systemic disease and he is not a candidate for RCHOP. 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 Management is palliative and is guided by disease bulk, moleculars and pace. He certainly has recurrent pleural effusions and hence palliative treatment is indicated I have recommended Rituxan weekly x 4 Repeat cytology 1/23/2020 was non diagnostic They will consent on day 1 
 
 
2) Bilateral Pleural effusions- Malignant L> R 
S/P L pleurx Now with marked SOB likely from pulmonary edema Will send to the ER 3) Anemia Secondary to CKD and likely malignancy Bm Involvement seems less likely with a negative PET 4) CKD 5) Afib on Eliquis 6) H/O Colon cancer s/p resection 2015 7) Psychosocial 
Supportive Family Plan:  
 
· Send to the ER for CT chest WO contrast, pulmonary eval 
· Recommend Rituxan weekly x 4- will set this up and consent on day 1 
· RTC C1D1 I appreciate the opportunity to participate in Mr. Ilan De La Paz's care.  
 
Signed By: Litzy Huang MD

## 2020-02-01 NOTE — DISCHARGE INSTRUCTIONS

## 2020-02-03 NOTE — ONCOLOGY PATHWAY NOTE
START OFF PATHWAY REGIMEN - Lymphoma and CLL 
 
 
 
[id number]:Rituximab (Weekly): 
    Rituximab (Rituxan(R)) **Always confirm dose/schedule in your pharmacy ordering system** Patient Characteristics: 
Disease Type: Not Applicable Disease Type: Not Applicable Disease Type: Not Applicable Intent of Therapy: 
Non-Curative / Palliative Intent, Not Discussed with Patient

## 2020-02-03 NOTE — TELEPHONE ENCOUNTER
Talked to daughter Oumar Trujillo that the left chest tube is draining and that dad is comfortable    Plan to get Rituxan as scheduled    NO Hospice at the moment

## 2020-02-03 NOTE — TELEPHONE ENCOUNTER
Talked to Dr. Abby RICHARDSON  Patient is declining  It does not look like the R sided effusion could be drained and the left is loculated    I think we would need to consider Hospice     I called his two daughters to discuss and left a VM for Jessica to call back

## 2020-02-03 NOTE — TELEPHONE ENCOUNTER
Rituxan is scheduled 2/6 at [address] daughter to confirm she was aware of this. She verified understanding and had no other questions/concerns.

## 2020-02-03 NOTE — TELEPHONE ENCOUNTER
Art, Dr. Mary Patel NP, called stating  Dr Jorge Kwon  would like a call back to discuss patient's dx and plan of care. Stated there seems to be confusion from family's perspective as well.       [phone]

## 2020-02-06 NOTE — PROGRESS NOTES
This note will not be viewable in [address] Social Work Navigator Encounter Patient Name:  Ross Evans Medical History: B cell Lymphoma- NOS Advance Directives: Patient does not have an advanced medical directive, and did not express interest in completing one today. Narrative:  
Please note initial assessment completed on 1/7/2020. Met with the patient and his daughter prior to his first Rituxan treatment today. The patient was in good spirits and stated \"I'm ready Francisco Javier! \" in regards to starting treatment today. Offered active listening and emotional support. Offered continued assistance and support as needed. Barriers to Care: No barriers identified at this time. Assessment/Action: 
Ongoing psychosocial support as desired by patient. Plan/Referral: No referrals placed at this time. Adrián Rawls LCSW

## 2020-02-06 NOTE — PROGRESS NOTES
[facility] [place] [address] [address], [address] W: [phone]  F: [phone] Reason for Visit:  
Ramsey Pickens is a 80 y.o. male who is seen  B cell Lymphoma- NOS Treatment History:  
· 12/4/19: CT chest : Bilateral pleural effusions, smaller right (decreased), and very large on the left with left hemidiaphragmatic inversion (increased.) · 12/5/19: Thoracentesis B cell Lymphoma · 12/27/19: PET CT showed LLL consolidative airspace disease is likely neoplastic, Bibasilar pleural rinds are likely neoplastic · 1/17/2020: BM biopsy- no evidence of Lymphoma. Peripheral blood - no abnormal cells and FISH negative · 2/6/20: Rituxan started History of Present Illness:  
Patient is a 80 y.o. male with multiple co morbidities as below specifically CKD, CHF , personal h/o colon cancer s/o hemicolectomy 2015 and Afib on Eliquis is seen for B cell Lymphoma He has had recurrent pleural effusions and was admitted 12/4/19-12/9/19 and had a left sided thoracentesis. Cytology of his pleural fluid was consistent with B-cell lymphoma. He had a PET scan scheduled and was to be seen by medical oncology but presented to the ER with SOB on 12/23/19. Was found to have recurrent L sided effusion and had a L sided PleurX cath placed by Dr. Sadie Bustillos. BM biopsy negative, Had a repeat cytology done which was negative. Comes in today for week 1 of Rituxan. He recently went to ER as his pleux cath was not draining well and had increased SOB. This was fixed by thoracic surgery and now draining 900cc tan colored fluid every other day. His SOB has improved since then. He denies fevers/chills. He uses 2L NC o2 as needed. In a wheelchair today but able to ambulate with walker. He otherwise feels ok and ready to start treatment today. Comes with supportive daughter today. He drinks wine He quit smoking FH of Leukemia and colon cancer Past Medical History: Diagnosis Date  Arrhythmia   
 irrigular heart beat- A FIB  Cancer [facility])  COLON   
 Cancer ([facility] [address] [facility] SKIN  
 Colon cancer ([facility] 2013  Heart failure ([facility]  Hypertension Past Surgical History:  
Procedure Laterality Date  HX OTHER SURGICAL    
 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  
 HX TONSIL AND ADENOIDECTOMY AS CHILD Social History Tobacco Use  Smoking status: Former Smoker Packs/day: 0.50 Years: 32.00 Pack years: 16.00 Types: Cigarettes Last attempt to quit: 1983 Years since quittin.6  Smokeless tobacco: Never Used Substance Use Topics  Alcohol use: Yes Comment: edie/gin 7 per week Family History Problem Relation Age of Onset  Colon Cancer Mother  Lung Cancer Father  Cancer Brother UNKNOWN Current Outpatient Medications Medication Sig  
 hydrALAZINE (APRESOLINE) 25 mg tablet Take 25 mg by mouth as needed. If Blood Pressure is 504 Systolic or higher  furosemide (LASIX) 40 mg tablet Take 40 mg by mouth two (2) times a day.  loperamide (IMODIUM) 2 mg capsule Take 2 mg by mouth daily as needed.  potassium chloride SR (KLOR-CON 10) 10 mEq tablet Take 20 mEq by mouth daily.  guaiFENesin-dextromethorphan (ROBAFEN DM COUGH)  mg/5 mL liqd Take 30 mL by mouth every four (4) hours as needed.  diphenhydrAMINE (BENADRYL ALLERGY) 25 mg tablet Take 12.5 mg by mouth nightly as needed for Sleep.  apixaban (ELIQUIS) 2.5 mg tablet Take 2.5 mg by mouth two (2) times a day. Indications: Atrial Fibrillation  ferrous sulfate 325 mg (65 mg iron) tablet Take 325 mg by mouth two (2) times a day.  levothyroxine (SYNTHROID) 175 mcg tablet Take 175 mcg by mouth Daily (before breakfast).  melatonin 10 mg tab Take 1 Tab by mouth nightly.  omeprazole (PRILOSEC) 20 mg capsule Take 20 mg by mouth two (2) times a day.  thiamine HCL (B-1) 100 mg tablet Take 100 mg by mouth daily.  triamcinolone acetonide (KENALOG) 0.1 % topical cream Apply  to affected area two (2) times daily as needed for Skin Irritation or Itching. use thin layer  cholecalciferol, vitamin D3, (VITAMIN D3) 2,000 unit tab Take 1 Tab by mouth daily.  dutasteride (AVODART) 0.5 mg capsule Take 0.5 mg by mouth daily.  terazosin (HYTRIN) 10 mg capsule Take 10 mg by mouth daily. Indications: high blood pressure No current facility-administered medications for this visit. No Known Allergies Review of Systems: A complete review of systems was obtained, negative except as described above. Physical Exam:  
 
Visit Vitals /78 (BP 1 Location: Left arm) Pulse (!) 59 Temp 97.4 °F (36.3 °C) Ht 6' 1\" (1.854 m) Wt 167 lb (75.8 kg) SpO2 98% BMI 22.03 kg/m² ECOG PS: 2 General: appears comfortable, in wheelchair, no acute distress Eyes: PERRLA, anicteric sclerae HENT: Atraumatic, OP clear CV: Normal rate, no peripheral edema Resp: normal respiratory effort, pleurex cath noted Skin: Multiple ecchymoses, or petechiae. Normal temperature, turgor, and texture. Psych: Alert, oriented, appropriate affect, normal judgment/insight Results:  
 
Lab Results Component Value Date/Time WBC 9.6 02/06/2020 09:20 AM  
 HGB 9.1 (L) 02/06/2020 09:20 AM  
 HCT 29.8 (L) 02/06/2020 09:20 AM  
 PLATELET 919 74/74/8152 09:20 AM  
 MCV 96.8 02/06/2020 09:20 AM  
 ABS. NEUTROPHILS 8.6 (H) 02/06/2020 09:20 AM  
 
Lab Results Component Value Date/Time  Sodium 138 02/06/2020 09:20 AM  
 Potassium 5.2 (H) 02/06/2020 09:20 AM  
 Chloride 105 02/06/2020 09:20 AM  
 CO2 30 02/06/2020 09:20 AM  
 Glucose 248 (H) 02/06/2020 09:20 AM  
 BUN 52 (H) 02/06/2020 09:20 AM  
 Creatinine 1.73 (H) 02/06/2020 09:20 AM  
 GFR est AA 45 (L) 02/06/2020 09:20 AM  
 GFR est non-AA 37 (L) 02/06/2020 09:20 AM  
 Calcium 8.2 (L) 02/06/2020 09:20 AM  
 Glucose (POC) 148 (H) 07/11/2013 09:36 PM  
 
Lab Results Component Value Date/Time Bilirubin, total 0.3 02/06/2020 09:20 AM  
 ALT (SGPT) 20 02/06/2020 09:20 AM  
 AST (SGOT) 18 02/06/2020 09:20 AM  
 Alk. phosphatase 108 02/06/2020 09:20 AM  
 Protein, total 5.9 (L) 02/06/2020 09:20 AM  
 Albumin 2.0 (L) 02/06/2020 09:20 AM  
 Globulin 3.9 02/06/2020 09:20 AM  
 
Lab Results Component Value Date/Time Ferritin 132 07/26/2019 06:03 AM  
 Vitamin B12 558 07/26/2019 06:03 AM  
 Folate 9.8 07/26/2019 06:03 AM  
  (H) 12/17/2019 02:15 PM  
 TSH 4.08 (H) 12/23/2019 09:45 AM  
 M-Deion Not Observed 07/27/2019 03:27 AM  
 HIV SCREEN 4TH GENERATION WRFX Non Reactive 12/17/2019 02:15 PM  
 
Lab Results Component Value Date/Time INR 1.1 12/23/2019 09:45 AM  
 aPTT 33.3 (H) 12/23/2019 09:45 AM  
 
 
Records reviewed and summarized above. Pathology report(s) reviewed PLeural fluid cytology CYTOLOGIC INTERPRETATION:  
Pleural Fluid, ThinPrep and cell block:  
B-cell lymphoma, see comment General Categorization Positive for malignancy. Specimen Adequacy Satisfactory for evaluation. CPT: [id number], [id number], [id number], [id number], [id number], [id number] Comment Overall findings are diagnostic of a B-cell lymphoma. The differential diagnosis includes diffuse large B-cell lymphoma, B-cell prolymphocytic leukemia/lymphoma, and chronic lymphocytic leukemia/small lymphocytic lymphoma. Correlation with radiology and biopsy/excision of suspicious areas is recommended. Bone marrow biopsy is also recommended, if clinically indicated. This case is seen in consultation with Dr. Arnie Luna, who agrees with the above findings. Immunohistochemical stains are performed from block 1A. CD20 is diffusely positive, indicating a B-cell lymphoproliferative disorder. CD3 marks few background small T-cells. Cyclin D1 and SOX11 stains are negative, making mantle cell lymphoma less likely.  EBV in situ hybridization is also negative. Immunohistochemical positive and negative controls stain appropriately. Concurrent flow cytometric studies are reviewed. Appropriate immunohistochemical stains are ordered and evaluated in order to further characterize and/or quantify the process observed by light microscopy Flow Cytometry:  
Diagnosis: Monoclonal B-cells, consistent with a B-cell lymphoproliferative disorder (see comments). Comments: Flow cytometry shows a monoclonal B-cell population (94% of total cells) co-expression CD5 and CD23, consistent with a B-cell lymphoma/leukemia. B-lymphocytes appear to be mostly intermediate to large based on forward light scatter characteristics. The differential diagnosis includes but is not limited to chronic lymphocytic leukemia/small lymphocytic lymphoma, prolymphocytic B-cell leukemia/lymphoma, mantle cell lymphoma, and diffuse large B-cell lymphoma. Please correlate the result with morphologic findings and clinical information Bone marrow biopsy 1/17/2020 Bone marrow, posterior iliac crest, aspirate, clot section, touch imprint, and decalcified core biopsy: Hypercellular bone marrow with maturing trilineage hematopoiesis No morphologic or flow cytometric evidence of B-cell non-Hodgkin lymphoma Flow cytometry shows no diagnostic immunophenotypic abnormalities Chest X ray 1/28/2020 done outside Showed bilateral pulmonary edema' Assessment:  
1) B cell Lymphoma With bilateral ( L> R) Pleural effusions s/p Pleurx placement PET CT with no other clear site of disease apart from vague Lung consolidative changes Pleural fluid cytology is reviewed with Heme Path Dr. Chidi Willis and it is noted that these cells are intermediate to large sized, CD20, CD23, CD5 positive and are lambda light chain restricted Notably EBV negative/ CCND1 negative HIV negative BM biopsy negative and no abnormal cells on PB Flow and FISH 
 
 Differentials include CLL though morphology does not fit, large B cell Lymphoma,  prolymphocytic B-cell leukemia/lymphoma. Mantle cell is ruled out as is primary effusion Lymphoma He has a reasonable PS status despite is age and co morbidities. Unfortunately we don't have a cell block for accurate characterization of this Lymphoma. 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 DLBCL cannot be r/o. Thank fully there is not much in the way of systemic disease and he is not a candidate for RCHOP. 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 Management is palliative and is guided by disease bulk, moleculars and pace. He certainly has recurrent pleural effusions and hence palliative treatment is indicated I have recommended Rituxan weekly x 4. Today is week 1. If he deteriorates rather quickly on this his daughters realize that hospice may be the best next step 2) Bilateral Pleural effusions- Malignant L> R 
S/P L pleurx, now draining appropriately 3) Anemia Secondary to CKD and likely malignancy Bm Involvement seems less likely with a negative PET 4) CKD 5) Afib on Eliquis 6) H/O Colon cancer s/p resection 2015 7) Psychosocial 
Supportive Family Plan: · Proceed today with weekly Rituxan (375mg/m2) x 4 weeks · Labs today to include CBC with diff, CMP, acute hap panel · Teach/consent obtained today · Follow with thoracic surgery as scheduled · Home health following to assist with draining pleur-x cath RTC every other Rituxan infusion, next in 2 weeks or sooner if indicated I appreciate the opportunity to participate in Mr. Delfino De La Paz's care. Seen in conjunction with eRddy Lnadaverde NP Signed By: Claudetta Cruel, MD

## 2020-02-06 NOTE — PROGRESS NOTES
Pharmacy Note- Chemotherapy Education    Melina Brady is a  80 y.o.male  diagnosed with B-cell lymphoma here today for Cycle 1, Day 1 chemotherapy counseling and consent. Mr. Cary Matias is being treated with riTUXimab. Provided education on riTUXimab and premedications - diphenhydramine and acetaminophen. Side effects of chemotherapy reviewed included s/s infection, fatigue,  Diarrhea/constipation, flu-like symptoms, low blood pressure, hepatitis b reactivation and infusion reactions. Patient given ways to manage these side effects and when to contact office. [address] Handout of medications provided to patient. Mr. Cary Matias verbalized understanding of the information presented and all of the patient's questions were answered.     Kathleen Kasper, AyaanD, BCPS, BCOP

## 2020-02-06 NOTE — PROGRESS NOTES
Stephan Dodge is a 80 y.o. male Chief Complaint Patient presents with  Follow-up B cell Lymphoma 1. Have you been to the ER, urgent care clinic since your last visit? Hospitalized since your last visit? No 
2. Have you seen or consulted any other health care providers outside of the [address] [place] [address] since your last visit? Include any pap smears or colon screening. No

## 2020-02-06 NOTE — PROGRESS NOTES
[facility] - Chemotherapy Progress Note    0948 Pt admit to [facility] for Rituxan/C1 via wheelchair in stable condition accompanied by family. Assessment completed. No new concerns voiced. Labs drawn peripherally and sent for processing. Pt upstairs to MD appointment. 1145 Return to [facility]; PIV access established in R arm w/ positive blood return; line flushed, NS infusing    Visit Vitals  /64   Pulse (!) 56   Temp 97.6 °F (36.4 °C)   Resp 16     Medications Administered     0.9% sodium chloride infusion     Admin Date  02/06/2020 Action  New Bag Dose  25 mL/hr Rate  25 mL/hr Route  IntraVENous Administered By  Adria Gotti RN          acetaminophen (TYLENOL) tablet 650 mg     Admin Date  02/06/2020 Action  Given Dose  650 mg Route  Oral Administered By  Adrai Gotti RN          diphenhydrAMINE (BENADRYL) capsule 50 mg     Admin Date  02/06/2020 Action  Given Dose  25 mg Route  Oral Administered By  Adria Gotti RN          riTUXimab (RITUXAN) 743 mg in 0.9% sodium chloride 743 mL infusion     Admin Date  02/06/2020 Action  New Bag Dose  743 mg Rate  50 mL/hr Route  IntraVENous Administered By  Adria Gotti RN                  6935 Pt tolerated treatment well. Pt remained in [facility] x30 mins for observation; remaining stay uneventful. PIV removed at discharge. D/c home via wheelchair in no distress. Pt's family aware of next [facility] appointment scheduled for 02/13/2020.     Recent Results (from the past 12 hour(s))   CBC WITH AUTOMATED DIFF    Collection Time: 02/06/20  9:20 AM   Result Value Ref Range    WBC 9.6 4.1 - 11.1 K/uL    RBC 3.08 (L) 4.10 - 5.70 M/uL    HGB 9.1 (L) 12.1 - 17.0 g/dL    HCT 29.8 (L) 36.6 - 50.3 %    MCV 96.8 80.0 - 99.0 FL    MCH 29.5 26.0 - 34.0 PG    MCHC 30.5 30.0 - 36.5 g/dL    RDW 15.3 (H) 11.5 - 14.5 %    PLATELET 047 905 - 982 K/uL    MPV 8.8 (L) 8.9 - 12.9 FL    NRBC 0.0 0  WBC    ABSOLUTE NRBC 0.00 0.00 - 0.01 K/uL    NEUTROPHILS 90 (H) 32 - 75 %    LYMPHOCYTES 3 (L) 12 - 49 %    MONOCYTES 5 5 - 13 %    EOSINOPHILS 1 0 - 7 %    BASOPHILS 0 0 - 1 %    IMMATURE GRANULOCYTES 1 (H) 0.0 - 0.5 %    ABS. NEUTROPHILS 8.6 (H) 1.8 - 8.0 K/UL    ABS. LYMPHOCYTES 0.3 (L) 0.8 - 3.5 K/UL    ABS. MONOCYTES 0.5 0.0 - 1.0 K/UL    ABS. EOSINOPHILS 0.1 0.0 - 0.4 K/UL    ABS. BASOPHILS 0.0 0.0 - 0.1 K/UL    ABS. IMM. GRANS. 0.1 (H) 0.00 - 0.04 K/UL    DF SMEAR SCANNED      RBC COMMENTS ANISOCYTOSIS  1+       METABOLIC PANEL, COMPREHENSIVE    Collection Time: 02/06/20  9:20 AM   Result Value Ref Range    Sodium 138 136 - 145 mmol/L    Potassium 5.2 (H) 3.5 - 5.1 mmol/L    Chloride 105 97 - 108 mmol/L    CO2 30 21 - 32 mmol/L    Anion gap 3 (L) 5 - 15 mmol/L    Glucose 248 (H) 65 - 100 mg/dL    BUN 52 (H) 6 - 20 MG/DL    Creatinine 1.73 (H) 0.70 - 1.30 MG/DL    BUN/Creatinine ratio 30 (H) 12 - 20      GFR est AA 45 (L) >60 ml/min/1.73m2    GFR est non-AA 37 (L) >60 ml/min/1.73m2    Calcium 8.2 (L) 8.5 - 10.1 MG/DL    Bilirubin, total 0.3 0.2 - 1.0 MG/DL    ALT (SGPT) 20 12 - 78 U/L    AST (SGOT) 18 15 - 37 U/L    Alk. phosphatase 108 45 - 117 U/L    Protein, total 5.9 (L) 6.4 - 8.2 g/dL    Albumin 2.0 (L) 3.5 - 5.0 g/dL    Globulin 3.9 2.0 - 4.0 g/dL    A-G Ratio 0.5 (L) 1.1 - 2.2     HEPATITIS PANEL, ACUTE    Collection Time: 02/06/20  9:20 AM   Result Value Ref Range    Hepatitis A, IgM NONREACTIVE NR      __          Hepatitis B surface Ag <0.10 Index    Hep B surface Ag Interp. NEGATIVE  NEG      __          Hepatitis B core, IgM NONREACTIVE NR      __          Hepatitis C virus Ab 1.46 Index    Hep C  virus Ab Interp.  REACTIVE (A) NR      Hep C  virus Ab comment Method used is [facility]

## 2020-02-07 NOTE — TELEPHONE ENCOUNTER
Patient's daughters, Mouna Kim and Heavenly Ochoa (on HIPAA) called to say that the patient's PleurX is getting clogged again with the fibrous accumulation that was happening last week. He may need his drain cleared again with the Atleplase that Josse used in the ER last week when she went there to check on him when she heard that he was there. The patient had his first \"chemo-like\" treatment yesterday. He is having trouble breathing, but they don't know as to what extent at this time. Mouna Kim is going to head over to [facility] to check on the patient. I advised Mouna Ochoa that Josse and Dr. Gaston Lauren would be in surgery all afternoon, but that I would send Josse a message letting her know what was happening. I would give her Lb's phone number. She would either call Lb directly, or she would tell me what to do at some point between cases. Angela Ochoa verbalized understanding.

## 2020-02-08 NOTE — ED NOTES
6266 Bedside shift change report given to Juanjose Royal (oncoming nurse) by Bentley Eden (offgoing nurse). Report included the following information SBAR.  
 
3913 Patient was drowsy when attempted to get patient in w/c. Daughters requested to keep patient in w/c for a few minutes until he was fully alert 36 Daughters requested nurse to come in room. Patient was alert. 200 md reviewed discharge instructions with the patient. The patient verbalized understanding. Patient ambulated out of ED with 2 daughters via w/c. RN and SN assisted with placing patient in car.

## 2020-02-08 NOTE — ED PROVIDER NOTES
24-year-old male with history of B-cell lymphoma, prior colon cancer, [facility] with complaints of altered mental status. Per patient's daughter she reports he just started a new treatment for his B-cell lymphoma on 2/6. He sees Dr. Davi Gann, oncology. She reports since then the patient has been slightly more confused than his normal.  She reports he does have dementia. She states however when he does get treatments he normally is confused. She is more concerned as they were having trouble draining his pleural catheter yesterday at the facility and were only able to get 75 mL's out. She feels like it is obstructed which happened at the end of January as well. She reports Dr. Sadie Bustillos is who normally sees him and he is on call this weekend. Past Medical History:  
Diagnosis Date  Arrhythmia   
 irrigular heart beat- A FIB  Cancer [facility]) 2013 COLON   
 Cancer ([facility] [address] SKIN  
 Colon cancer ([facility] [address] 7/1/2013  Heart failure ([facility] [address]  Hypertension Past Surgical History:  
Procedure Laterality Date  HX OTHER SURGICAL    
 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  
 HX TONSIL AND ADENOIDECTOMY AS CHILD Family History:  
Problem Relation Age of Onset  Colon Cancer Mother  Lung Cancer Father  Cancer Brother UNKNOWN Social History Socioeconomic History  Marital status:  Spouse name: Not on file  Number of children: Not on file  Years of education: Not on file  Highest education level: Not on file Occupational History  Not on file Social Needs  Financial resource strain: Not on file  Food insecurity:  
  Worry: Not on file Inability: Not on file  Transportation needs:  
  Medical: Not on file Non-medical: Not on file Tobacco Use  Smoking status: Former Smoker Packs/day: 0.50 Years: 32.00 Pack years: 16.00 Types: Cigarettes Last attempt to quit: 1983 Years since quittin.6  Smokeless tobacco: Never Used Substance and Sexual Activity  Alcohol use: Yes Comment: edie/gin 7 per week  Drug use: No  
 Sexual activity: Not on file Lifestyle  Physical activity:  
  Days per week: Not on file Minutes per session: Not on file  Stress: Not on file Relationships  Social connections:  
  Talks on phone: Not on file Gets together: Not on file Attends Mos[employer] service: Not on file Active member of club or organization: Not on file Attends meetings of clubs or organizations: Not on file Relationship status: Not on file  Intimate partner violence:  
  Fear of current or ex partner: Not on file Emotionally abused: Not on file Physically abused: Not on file Forced sexual activity: Not on file Other Topics Concern  Not on file Social History Narrative  Not on file ALLERGIES: Patient has no known allergies. Review of Systems Unable to perform ROS: Mental status change There were no vitals filed for this visit. Physical Exam 
Vitals signs and nursing note reviewed. Constitutional:   
   General: He is not in acute distress. Appearance: He is well-developed. He is not diaphoretic. HENT:  
   Head: Normocephalic and atraumatic. Eyes:  
   Conjunctiva/sclera: Conjunctivae normal.  
   Comments: Pinpoint pupils bilaterally Neck: Musculoskeletal: Normal range of motion and neck supple. Cardiovascular:  
   Rate and Rhythm: Normal rate. Pulses: Normal pulses. Heart sounds: Normal heart sounds. No murmur. No friction rub. Comments: Irregular rhythm Pulmonary:  
   Effort: Pulmonary effort is normal. No respiratory distress. Breath sounds: Normal breath sounds. No stridor. No wheezing, rhonchi or rales. Chest:  
   Chest wall: No tenderness. Abdominal: General: Bowel sounds are normal. There is no distension. Palpations: Abdomen is soft. There is no mass. Tenderness: There is no abdominal tenderness. There is no guarding or rebound. Hernia: No hernia is present. Musculoskeletal: Normal range of motion. General: No tenderness. Skin: 
   General: Skin is warm and dry. Coloration: Skin is not pale. Comments: Ecchymosis to left anterior chest wall along clavicle area approximately 3 cm in diameter, left pleural catheter appears intact. Neurological:  
   Mental Status: He is alert. Cranial Nerves: No cranial nerve deficit. Sensory: No sensory deficit. Motor: No weakness or abnormal muscle tone. Coordination: Coordination normal.  
   Comments: Oriented to self only Psychiatric:     
   Behavior: Behavior normal.  
 
  
 
MDM Number of Diagnoses or Management Options Pleural effusion:  
Diagnosis management comments: Confusion check head CT check for tumor lysis check for infectious causes check for dehydration check for brain bleed eval for fluid overload is the daughter's report his pleural catheter is not working and that is more of their concern rather than the confusion which they states happens every time he has any new chemo treatment. Amount and/or Complexity of Data Reviewed Clinical lab tests: ordered and reviewed Tests in the radiology section of CPT®: ordered and reviewed Obtain history from someone other than the patient: yes (Daughters) Review and summarize past medical records: yes Discuss the patient with other providers: yes (Thoracic surgery and oncology) Independent visualization of images, tracings, or specimens: yes (ekg) Patient Progress Patient progress: stable Procedures ED EKG interpretation: 
Rhythm: atrial fib; and irregular. Rate (approx.): 65; Axis: left axis deviation;  QRS interval: normal ; ST/T wave: non-specific changes EKG documented by Jeanne Hays MD, scribe, as interpreted by aNnda Lucas MD, ED MD. 
 
12:00PM 
 
Spoke with Dr. Desiree Dumont, thoracic surgery he was spoken with the patient and family. He has reviewed patient's x-rays from today and given patient is in no distress and sats are 100% this is more of a chronic problem, he will see the patient in his clinic on Monday to troubleshoot the catheter. He is spoken with the family who is in agreement with plan. 12:48 PM 
Spoke with Dr. Maudie Gosselin, oncology and reviewed patient's presentation and history along with labs. He suspects some of the symptoms are due to dehydration associated with the treatment and recommend small amount of IV fluids. He will let Dr. Viktoria Erwin know patient was here this weekend. 2:53 PM 
Change of shift. Care of patient signed over to dr diaz. Bedside handoff complete. Awaiting ct result. Book with patient's daughters and offered admission. Patient's daughters report they would rather the patient go back to his facility where they have 24-hour care and be comfortable. He is DNR and they are pursuing palliative measures however they want him to be comfortable.

## 2020-02-08 NOTE — ED TRIAGE NOTES
1039: Patient arrives via EMS - [id number] - from [address] [place] [address] for altered mental status and increased SOB. Patient recently discharge in January for a pleural Effusion and had it drained - last fluid drainage was 50mL. 1040: Paged RT for ABG 
 
1149: Dr. Shon Anne on phone to speak with family. 1158: Verbal shift change report given to Duke Gunderson RN (oncoming nurse) by Olga Barrett RN (offgoing nurse). Report included the following information SBAR, Kardex, ED Summary, MAR, Recent Results and Cardiac Rhythm AFIB.

## 2020-02-08 NOTE — DISCHARGE INSTRUCTIONS
Patient Education        Learning About a Pleural Effusion  What is it? A pleural effusion (say \"PLER-michelle aa-ICSC-wfhj\") is the buildup of fluid in the space between tissues lining the lungs and the chest wall. Because of the fluid buildup, the lungs may not be able to expand completely. This can make it hard to breathe. A pleural empyema (say \"gu-im-DP-muh\") is a problem that can happen with pleural effusion. Bacteria or other infections cause pus to form in the pleural fluid. But most pleural effusions don't become infected. What causes it? A pleural effusion has many causes. They include pneumonia, cancer, inflammation of the tissues around the lungs, and heart failure. What are the symptoms? Symptoms of a pleural effusion may include:  · Trouble breathing. · Shortness of breath. · Chest pain. · Fever. · A cough. A minor pleural effusion may not cause any symptoms. How is it diagnosed? A pleural effusion is usually diagnosed with an X-ray and a physical exam. The doctor listens to the airflow in your lungs. How is it treated? A pleural effusion can be treated by removing fluid from the space between the tissues around the lungs. This is done with a needle that's put into the chest (thoracentesis). A small amount of the fluid may be sent to a lab to find out what is causing the buildup of fluid. Removing the fluid may help to relieve symptoms, such as shortness of breath and chest pain. It can help the lungs to expand more fully. If the pleural effusion doesn't get better, a catheter may be placed in the chest. This is a flexible tube that allows fluid to drain from the lungs. The catheter stays in the chest until the doctor removes it. Some people may get a treatment that removes the fluid and then puts a medicine into the chest cavity. This helps to prevent too much fluid from building up again. A minor pleural effusion often goes away on its own.   Doctors may need to treat the condition that is causing the pleural effusion. For example, you may get medicines to treat pneumonia or congestive heart failure. When the condition is treated, the effusion usually goes away. For a pleural empyema, the pus needs to be drained. It may drain from a flexible tube placed in the chest. Or you may have surgery to drain it. You also will get antibiotics. Follow-up care is a key part of your treatment and safety. Be sure to make and go to all appointments, and call your doctor if you are having problems. It's also a good idea to know your test results and keep a list of the medicines you take. Where can you learn more? Go to http://jaylon-neil.info/. Enter A920 in the search box to learn more about \"Learning About a Pleural Effusion. \"  Current as of: June 9, 2019  Content Version: 12.2  © 9096-8924 [employer]. Care instructions adapted under license by [employer] (which disclaims liability or warranty for this information). If you have questions about a medical condition or this instruction, always ask your healthcare professional. [address] any warranty or liability for your use of this information.

## 2020-02-10 NOTE — H&P
[facility] History and Physical 
 
Primary Care Provider: Derrick Donis DO Date of Service:  2/10/2020 Subjective:  
 
Leticia Rasmussen is a 80 y.o. male who with history of B-cell lymphoma and recurrent pleural effusions and after Pleurx drain placement presents from thoracic surgery office because of shortness of breath he went to Dr. Wale Quinonez clinic today and seen by the PA and noticed to have clogged tube and worsening pleural effusion. He was symptomatic from that. The PA attempted to flush the tube but it did not work. He is getting admitted here to get TPA through his tube. Daughter at bedside that helps with history. Otherwise no nausea vomiting diarrhea or any fevers Review of Systems: A comprehensive review of systems was negative except for that written in the History of Present Illness. Past Medical History:  
Diagnosis Date  Arrhythmia   
 irrigular heart beat- A FIB  Cancer [facility]) 2013 COLON   
 Cancer ([facility] [address] SKIN  
 Colon cancer ([facility] [address] 7/1/2013  Heart failure ([facility] [address]  Hypertension Past Surgical History:  
Procedure Laterality Date  HX OTHER SURGICAL    
 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  
 HX TONSIL AND ADENOIDECTOMY AS CHILD Prior to Admission medications Medication Sig Start Date End Date Taking? Authorizing Provider  
levoFLOXacin (LEVAQUIN) 500 mg tablet Take 1 Tab by mouth daily. 2/8/20   Teressa Henriquez MD  
hydrALAZINE (APRESOLINE) 25 mg tablet Take 25 mg by mouth as needed. If Blood Pressure is 551 Systolic or higher    Provider, Historical  
furosemide (LASIX) 40 mg tablet Take 40 mg by mouth two (2) times a day. Provider, Historical  
loperamide (IMODIUM) 2 mg capsule Take 2 mg by mouth daily as needed. Provider, Historical  
potassium chloride SR (KLOR-CON 10) 10 mEq tablet Take 20 mEq by mouth daily.     Provider, Historical  
 guaiFENesin-dextromethorphan (ROBAFEN DM COUGH)  mg/5 mL liqd Take 30 mL by mouth every four (4) hours as needed. Provider, Historical  
diphenhydrAMINE (BENADRYL ALLERGY) 25 mg tablet Take 12.5 mg by mouth nightly as needed for Sleep. Provider, Historical  
apixaban (ELIQUIS) 2.5 mg tablet Take 2.5 mg by mouth two (2) times a day. Indications: Atrial Fibrillation    Provider, Historical  
ferrous sulfate 325 mg (65 mg iron) tablet Take 325 mg by mouth two (2) times a day. Provider, Historical  
levothyroxine (SYNTHROID) 175 mcg tablet Take 175 mcg by mouth Daily (before breakfast). Provider, Historical  
melatonin 10 mg tab Take 1 Tab by mouth nightly. Provider, Historical  
omeprazole (PRILOSEC) 20 mg capsule Take 20 mg by mouth two (2) times a day. Provider, Historical  
thiamine HCL (B-1) 100 mg tablet Take 100 mg by mouth daily. Provider, Historical  
triamcinolone acetonide (KENALOG) 0.1 % topical cream Apply  to affected area two (2) times daily as needed for Skin Irritation or Itching. use thin layer    Provider, Historical  
cholecalciferol, vitamin D3, (VITAMIN D3) 2,000 unit tab Take 1 Tab by mouth daily. Provider, Historical  
dutasteride (AVODART) 0.5 mg capsule Take 0.5 mg by mouth daily. Provider, Historical  
terazosin (HYTRIN) 10 mg capsule Take 10 mg by mouth daily. Indications: high blood pressure    Provider, Historical  
 
No Known Allergies Family History Problem Relation Age of Onset  Colon Cancer Mother  Lung Cancer Father  Cancer Brother UNKNOWN  
  
 
SOCIAL HISTORY: 
Patient resides at Home. Patient ambulates . Smoking history: never Alcohol history:none Objective:  
 
 
Physical Exam:  
Visit Vitals /75 Pulse 65 Resp 19 Ht 6' 1\" (1.854 m) Wt 75.8 kg (167 lb) SpO2 100% BMI 22.03 kg/m² General:  Alert, cooperative, no distress, appears stated age. Head:  Normocephalic, without obvious abnormality, atraumatic. Eyes:  Conjunctivae/corneas clear. PERRL, EOMs intact. Fundi benign Ears:  Normal TMs and external ear canals both ears. Nose: Nares normal. Septum midline. Mucosa normal. No drainage or sinus tenderness. Throat: Lips, mucosa, and tongue normal. Teeth and gums normal.  
Neck: Supple, symmetrical, trachea midline, no adenopathy, thyroid: no enlargement/tenderness/nodules, no carotid bruit and no JVD. Back:   Symmetric, no curvature. ROM normal. No CVA tenderness. Lungs:   Left sided no air movement Chest wall:  No tenderness or deformity. Heart:  Regular rate and rhythm, S1, S2 normal, no murmur, click, rub or gallop. Abdomen:   Soft, non-tender. Bowel sounds normal. No masses,  No organomegaly. Genitalia:  Normal male without lesion, discharge or tenderness. Rectal:  Normal tone, normal prostate, no masses or tenderness Guaiac negative stool. Extremities: Extremities normal, atraumatic, no cyanosis or edema. Pulses: 2+ and symmetric all extremities. Skin: Skin color, texture, turgor normal. No rashes or lesions Lymph nodes: Cervical, supraclavicular, and axillary nodes normal.  
Neurologic: CNII-XII intact. Normal strength, sensation and reflexes throughout. Cap refill: Brisk, less than 3 seconds Pulses: 2+, symmetric in all extremities ECG:  normal EKG, normal sinus rhythm, unchanged from previous tracings Data Review: All diagnostic labs and studies have been reviewed. Chest x-ray was negative for infiltrate, effusion, pneumothorax, or wide mediastinum. Assessment:  
 
Active Problems: 
  Pleural effusion (12/23/2019) Plan: 1. Recurrent pleural effusion. Clogged Pleurx catheter. Plan to give TPA by thoracic surgery. Admit to telemetry. All postsurgical unit. #2 B-cell lymphoma On palliative chemotherapy Rituxan. #3 hypertension Currently controlled. He takes hydralazine A. Fib He is on Eliquis I would hold for now until he gets the TPA and if stable can resume his Eliquis in the morning. DVT prophylaxis SCDs FUNCTIONAL STATUS PRIOR TO HOSPITALIZATION (including history of recent falls):ambulatory Signed By: Shannon Calderon MD   
 February 10, 2020

## 2020-02-10 NOTE — ED PROVIDER NOTES
Patient is a 40-year-old male with a history of B-cell lymphoma and recurrent recurrent pleural effusions status post pleural drain, here with increasing shortness of breath over the past several days and inability of the drain to put out any pleural fluid. He was evaluated in Dr. Christianne Mccray clinic, please see their note, and sent here for IR thoracentesis. Most history provided by daughter as patient is a limited historian. No chest pain reported, nor fever, hemoptysis, respiratory distress. Patient was in the ER 2 days ago and had the drain infused with TPA which helped to drain more. No recent falls or trauma reported. Past Medical History:  
Diagnosis Date  Arrhythmia   
 irrigular heart beat- A FIB  Cancer [facility])  COLON   
 Cancer ([facility] [address] SKIN  
 Colon cancer ([facility] [address] 2013  Heart failure ([facility] [address]  Hypertension Past Surgical History:  
Procedure Laterality Date  HX OTHER SURGICAL    
 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  
 HX TONSIL AND ADENOIDECTOMY AS CHILD Family History:  
Problem Relation Age of Onset  Colon Cancer Mother  Lung Cancer Father  Cancer Brother UNKNOWN Social History Socioeconomic History  Marital status:  Spouse name: Not on file  Number of children: Not on file  Years of education: Not on file  Highest education level: Not on file Occupational History  Not on file Social Needs  Financial resource strain: Not on file  Food insecurity:  
  Worry: Not on file Inability: Not on file  Transportation needs:  
  Medical: Not on file Non-medical: Not on file Tobacco Use  Smoking status: Former Smoker Packs/day: 0.50 Years: 32.00 Pack years: 16.00 Types: Cigarettes Last attempt to quit: 1983 Years since quittin.6  Smokeless tobacco: Never Used Substance and Sexual Activity  Alcohol use:  Yes  
 Comment: edie/gin 7 per week  Drug use: No  
 Sexual activity: Not on file Lifestyle  Physical activity:  
  Days per week: Not on file Minutes per session: Not on file  Stress: Not on file Relationships  Social connections:  
  Talks on phone: Not on file Gets together: Not on file Attends [employer] service: Not on file Active member of club or organization: Not on file Attends meetings of clubs or organizations: Not on file Relationship status: Not on file  Intimate partner violence:  
  Fear of current or ex partner: Not on file Emotionally abused: Not on file Physically abused: Not on file Forced sexual activity: Not on file Other Topics Concern  Not on file Social History Narrative  Not on file ALLERGIES: Patient has no known allergies. Review of Systems Unable to perform ROS: Age (limited historian) Vitals:  
 02/10/20 1124 02/10/20 1454 BP: (!) 156/101 136/70 Pulse: 71 68 Resp: 24 22 SpO2: 100% 99% Weight: 75.8 kg (167 lb) Height: 6' 1\" (1.854 m) Physical Exam 
Vitals signs reviewed. Constitutional:   
   General: He is not in acute distress. Appearance: He is well-developed. HENT:  
   Head: Normocephalic and atraumatic. Eyes:  
   Conjunctiva/sclera: Conjunctivae normal.  
Neck: Musculoskeletal: Normal range of motion and neck supple. Cardiovascular:  
   Rate and Rhythm: Normal rate. Heart sounds: Normal heart sounds. Pulmonary:  
   Effort: Pulmonary effort is normal. No respiratory distress. Breath sounds: Examination of the left-middle field reveals decreased breath sounds. Examination of the left-lower field reveals decreased breath sounds. Decreased breath sounds present. Chest:  
   Comments: L pleural drain noted Abdominal:  
   Palpations: Abdomen is soft. Tenderness: There is no abdominal tenderness. There is no guarding. Musculoskeletal: Normal range of motion. Skin: 
   General: Skin is warm and dry. Neurological:  
   Mental Status: He is alert. Mental status is at baseline. MDM Procedures Assessment and plan: This is a 44-year-old male with here with gradually worsening shortness of breath and left pleural effusion, sent for thoracentesis by IR. Unfortunately, the patient waited in the waiting room for several hours and by the time I evaluated the patient and placed the order, it was 4:55 PM.  We will see if IR is still in house and can do the thoracentesis. If not, plan for admission so the procedure can occur in the morning. 5:30 PM 
I consulted thoracic surgery team.  Spoke with Ms. Buell Jeans, [address] Dr. Shon Anne has recommend admission to the hospital service for infusion of TPA pleural drain. This will need to be performed on 4 W. or IMCU. Hospitalist Jaja Serve for Admission 5:32 PM 
 
ED Room Number: TI62/14 Patient Name and age:  April Gentile 80 y.o.  male Working Diagnosis: 1. Pleural effusion 2. Malignant lymphoma, undifferentiated cell, non-Burkitt's ([facility] [address] 3. SOB (shortness of breath) Readmission: no 
Isolation Requirements:  no 
Recommended Level of Care:  telemetry Code Status:  Do Not Resuscitate Department:[facility] Adult ED - [phone] Other:  Seen by Thoracic surgery; will need bed on 4W or IMCU if possible for tPA infusion of pleural drain.

## 2020-02-10 NOTE — PROGRESS NOTES
Mitchel check. 1. Have you been to the ER, urgent care clinic since your last visit? Hospitalized since your last visit? Yes, [facility] on 2/8/2020 for AMS. 2. Have you seen or consulted any other health care providers outside of the [address] [place] [address] since your last visit? Include any pap smears or colon screening.  No

## 2020-02-10 NOTE — ED TRIAGE NOTES
Triage Note: Patient is coming over from Dr. Delbert Padgett office for drainage. Patient was seen here on Saturday for the same and admitted in December for chest tube because of extra fluid.

## 2020-02-10 NOTE — PROGRESS NOTES
Subjective:      Ross Evans is a 80 y.o. male presents for PleurX catheter check . Procedure: Insertion of left sided pleurX catheter by Dr Joanie Rea on 12/23/19 for long term management of recurrent effusion due to B-cell Lymphoma. On 1/31/2020 required instillation of Alteplase 10mg/30 ml to open the tube again. Appetite is fair. Eating a regular diet without difficulty. Bowel movements are regular. The patient is voiding without difficulty. The patient is not having any pain. .    Objective:     Visit Vitals  /84 (BP 1 Location: Left arm, BP Patient Position: Sitting)   Pulse 91   Resp 18   Ht 6' 1\" (1.854 m)   Wt 167 lb (75.8 kg)   SpO2 93% Comment: 3 liters O2   BMI 22.03 kg/m²       Physical Exam:  GENERAL: alert, cooperative, no distress, appears stated age, LUNG: diminished breath sounds R base, L base, HEART: regular rate and rhythm, ABDOMEN: soft, non-tender. Bowel sounds normal. No masses,  no organomegaly, EXTREMITIES:  extremities normal, atraumatic, no cyanosis or edema, SKIN: Normal.    Data Review chest CT 2/8/2020  INDICATION: Follow-up abnormal chest x-ray, colon cancer     COMPARISON: Chest radiograph performed earlier the same day, chest CT 1/31/2020     CONTRAST: None.     TECHNIQUE:  5 mm axial images were obtained through the chest. Coronal and  sagittal reconstructions were generated. CT dose reduction was achieved through  use of a standardized protocol tailored for this examination and automatic  exposure control for dose modulation.     The absence of intravenous contrast reduces the sensitivity for evaluation of  the mediastinum and upper abdominal organs.     FINDINGS:     THYROID: No nodule. MEDIASTINUM: Mediastinal lymphadenopathy unchanged with a reference 2.4 cm AP  window lymph node. NATACHA: Limited bilateral intravenous contrast.  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   MAIN PULMONARY ARTERY: Enlarged. TRACHEA/BRONCHI: Patent. ESOPHAGUS: No wall thickening or dilatation. HEART: Marked right atrial dilatation again noted. PLEURA: Moderate bilateral pleural effusions, increased on the left and stable  on the right. Left-sided chest tube unchanged in position. LUNGS: Substantial consolidation now noted in the left upper and left lower  lobe. Right lower lobe atelectasis again demonstrated. INCIDENTALLY IMAGED UPPER ABDOMEN: Unchanged indeterminate lesion left hepatic  lobe. BONES: Degenerative changes are seen in the thoracic spine.     IMPRESSION  IMPRESSION:  Increased left pleural effusion with increased underlying consolidation in the  left upper and left lower lobe. Right lower lobe atelectasis again demonstrated. Stable mediastinal lymphadenopathy    Assessment:     Patient Active Problem List   Diagnosis Code    Colon cancer ([facility] [address] C18.9    Acute on chronic systolic CHF (congestive heart failure) (HCC) I50.23    SOB (shortness of breath) R06.02    Dyspnea R06.00    Pleural effusion J90    Diffuse large B-cell lymphoma ([facility] [address] C83.30    Malignant lymphoma, undifferentiated cell, non-Burkitt's ([facility] [address] C85.80       Daughter is with Me Christianjoeon today. He is currently on [place] Oxygen. Went to the ED on 2/8/20 for SOB. Chest CT as above. Daughter tells me the nurse could only get 50 ml last tie he drained the pleurx. I flushed it several times without any resistance and the flush is easily aspirated. Scant amount obtained in vacuum bottle. Plan/Recommendations/Medical Decision Making:     Sent to ED due to SOB. He needs a thoracentesis by JAGDEEP Roberson, [address]  Thoracic Surgery

## 2020-02-11 NOTE — PROGRESS NOTES
Problem: Heart Failure: Day 1 Goal: Activity/Safety Outcome: Progressing Towards Goal 
Note:  
Patient has his own private sitter at bedside, bed is in the lowest position and wheels are locked, call bell is within reach, bathroom light is on during evening hours, gripper socks are on and patient has been instructed to call out for assistance if needed. As of now, patient is free from falls and will continue to be monitored. Goal: Consults, if ordered Outcome: Progressing Towards Goal 
Note:  
Patient has a consult with IR to have them unclog his pleurx drain Goal: Respiratory Outcome: Progressing Towards Goal 
Note:  
Patient is on 3L NC and his O2 saturations are remaining above 90% Goal: Treatments/Interventions/Procedures Outcome: Progressing Towards Goal 
Note: When pt arrived on our floor his rectal temp was 95.3. Patient place on gabrielle hugger and temp is now 96.8

## 2020-02-11 NOTE — PROGRESS NOTES
Thoracic Surgery Simple Progress Note Admit Date: 2/10/2020 POD: * No surgery found * Procedure:  * No surgery found * Subjective:  
 
Patient has complaints: No significant medical complaints Review of Systems: 
 
CARDIAC: positive for H/O A-fib RESP: positive for pleural effusion NEURO:  positive for memory problems EXT: Denies new swelling or pain in the legs or calves. Objective:  
 
Blood pressure 168/71, pulse 66, temperature 97.8 °F (36.6 °C), resp. rate 17, height 6' 1\" (1.854 m), weight 175 lb 11.3 oz (79.7 kg), SpO2 100 %. Temp (24hrs), Av °F (36.1 °C), Min:95.3 °F (35.2 °C), Max:98.1 °F (36.7 °C) Hemodynamics PAP 
  CO 
  CI No intake/output data recorded. No intake/output data recorded. EXAM: 
GENERAL: VSS, afrible, awake and cooperative HEART:  regular rate and rhythm LUNG: diminished breath sounds bilateral lung bases NEURO:  normal without focal findings 
mental status, speech normal, alert and oriented x iii EXTREMITIES:No evidence of DVT seen on physical exam. 
GI/: Abd soft, nonterder with + bowel sounds. Voiding Labs: 
Recent Results (from the past 24 hour(s)) EKG, 12 LEAD, INITIAL Collection Time: 02/10/20 12:06 PM  
Result Value Ref Range Ventricular Rate 70 BPM  
 Atrial Rate 75 BPM  
 QRS Duration 72 ms Q-T Interval 444 ms QTC Calculation (Bezet) 479 ms Calculated R Axis -59 degrees Calculated T Axis 53 degrees Diagnosis Atrial fibrillation Left axis deviation Anteroseptal infarct (cited on or before 2019) When compared with ECG of 2020 10:53, No significant change was found Confirmed by Yris Shah MD ([id number]) on 2/10/2020 4:22:59 PM 
  
SAMPLES BEING HELD Collection Time: 02/10/20 12:13 PM  
Result Value Ref Range SAMPLES BEING HELD 1RED 1BLU   
 COMMENT   Add-on orders for these samples will be processed based on acceptable specimen integrity and analyte stability, which may vary by analyte. CBC WITH AUTOMATED DIFF Collection Time: 02/10/20 12:13 PM  
Result Value Ref Range WBC 10.2 4.1 - 11.1 K/uL  
 RBC 3.07 (L) 4.10 - 5.70 M/uL HGB 8.9 (L) 12.1 - 17.0 g/dL HCT 30.5 (L) 36.6 - 50.3 % MCV 99.3 (H) 80.0 - 99.0 FL  
 MCH 29.0 26.0 - 34.0 PG  
 MCHC 29.2 (L) 30.0 - 36.5 g/dL  
 RDW 15.5 (H) 11.5 - 14.5 % PLATELET 736 237 - 221 K/uL MPV 9.0 8.9 - 12.9 FL  
 NRBC 0.0 0  WBC ABSOLUTE NRBC 0.00 0.00 - 0.01 K/uL NEUTROPHILS 89 (H) 32 - 75 % LYMPHOCYTES 4 (L) 12 - 49 % MONOCYTES 5 5 - 13 % EOSINOPHILS 1 0 - 7 % BASOPHILS 0 0 - 1 % IMMATURE GRANULOCYTES 1 (H) 0.0 - 0.5 % ABS. NEUTROPHILS 9.1 (H) 1.8 - 8.0 K/UL  
 ABS. LYMPHOCYTES 0.4 (L) 0.8 - 3.5 K/UL  
 ABS. MONOCYTES 0.5 0.0 - 1.0 K/UL  
 ABS. EOSINOPHILS 0.1 0.0 - 0.4 K/UL  
 ABS. BASOPHILS 0.0 0.0 - 0.1 K/UL  
 ABS. IMM. GRANS. 0.1 (H) 0.00 - 0.04 K/UL  
 DF SMEAR SCANNED    
 RBC COMMENTS ANISOCYTOSIS 1+ 
    
 RBC COMMENTS MACROCYTOSIS 
1+ METABOLIC PANEL, COMPREHENSIVE Collection Time: 02/10/20 12:13 PM  
Result Value Ref Range Sodium 138 136 - 145 mmol/L Potassium 4.9 3.5 - 5.1 mmol/L Chloride 103 97 - 108 mmol/L  
 CO2 32 21 - 32 mmol/L Anion gap 3 (L) 5 - 15 mmol/L Glucose 181 (H) 65 - 100 mg/dL BUN 36 (H) 6 - 20 MG/DL Creatinine 1.47 (H) 0.70 - 1.30 MG/DL  
 BUN/Creatinine ratio 24 (H) 12 - 20 GFR est AA 54 (L) >60 ml/min/1.73m2 GFR est non-AA 45 (L) >60 ml/min/1.73m2 Calcium 8.5 8.5 - 10.1 MG/DL Bilirubin, total 0.3 0.2 - 1.0 MG/DL  
 ALT (SGPT) 21 12 - 78 U/L  
 AST (SGOT) 17 15 - 37 U/L Alk. phosphatase 92 45 - 117 U/L Protein, total 6.6 6.4 - 8.2 g/dL Albumin 1.9 (L) 3.5 - 5.0 g/dL Globulin 4.7 (H) 2.0 - 4.0 g/dL A-G Ratio 0.4 (L) 1.1 - 2.2    
TROPONIN I Collection Time: 02/10/20 12:13 PM  
Result Value Ref Range Troponin-I, Qt. <0.05 <0.05 ng/mL GLUCOSE, POC Collection Time: 02/11/20  4:27 AM  
Result Value Ref Range Glucose (POC) 139 (H) 65 - 100 mg/dL Performed by Melody Mir CBC WITH AUTOMATED DIFF Collection Time: 02/11/20  4:34 AM  
Result Value Ref Range WBC 9.5 4.1 - 11.1 K/uL  
 RBC 3.03 (L) 4.10 - 5.70 M/uL HGB 8.8 (L) 12.1 - 17.0 g/dL HCT 29.9 (L) 36.6 - 50.3 % MCV 98.7 80.0 - 99.0 FL  
 MCH 29.0 26.0 - 34.0 PG  
 MCHC 29.4 (L) 30.0 - 36.5 g/dL  
 RDW 15.6 (H) 11.5 - 14.5 % PLATELET 541 868 - 942 K/uL MPV 9.4 8.9 - 12.9 FL  
 NRBC 0.0 0  WBC ABSOLUTE NRBC 0.00 0.00 - 0.01 K/uL NEUTROPHILS 89 (H) 32 - 75 % LYMPHOCYTES 4 (L) 12 - 49 % MONOCYTES 5 5 - 13 % EOSINOPHILS 1 0 - 7 % BASOPHILS 0 0 - 1 % IMMATURE GRANULOCYTES 1 (H) 0.0 - 0.5 % ABS. NEUTROPHILS 8.4 (H) 1.8 - 8.0 K/UL  
 ABS. LYMPHOCYTES 0.4 (L) 0.8 - 3.5 K/UL  
 ABS. MONOCYTES 0.5 0.0 - 1.0 K/UL  
 ABS. EOSINOPHILS 0.1 0.0 - 0.4 K/UL  
 ABS. BASOPHILS 0.0 0.0 - 0.1 K/UL  
 ABS. IMM. GRANS. 0.1 (H) 0.00 - 0.04 K/UL  
 DF SMEAR SCANNED    
 RBC COMMENTS ANISOCYTOSIS 1+ 
    
 RBC COMMENTS MACROCYTOSIS 
1+ METABOLIC PANEL, COMPREHENSIVE Collection Time: 02/11/20  4:34 AM  
Result Value Ref Range Sodium 138 136 - 145 mmol/L Potassium 4.9 3.5 - 5.1 mmol/L Chloride 106 97 - 108 mmol/L  
 CO2 30 21 - 32 mmol/L Anion gap 2 (L) 5 - 15 mmol/L Glucose 139 (H) 65 - 100 mg/dL BUN 37 (H) 6 - 20 MG/DL Creatinine 1.41 (H) 0.70 - 1.30 MG/DL  
 BUN/Creatinine ratio 26 (H) 12 - 20 GFR est AA 57 (L) >60 ml/min/1.73m2 GFR est non-AA 47 (L) >60 ml/min/1.73m2 Calcium 8.7 8.5 - 10.1 MG/DL Bilirubin, total 0.2 0.2 - 1.0 MG/DL  
 ALT (SGPT) 19 12 - 78 U/L  
 AST (SGOT) 18 15 - 37 U/L Alk. phosphatase 86 45 - 117 U/L Protein, total 6.5 6.4 - 8.2 g/dL Albumin 1.9 (L) 3.5 - 5.0 g/dL Globulin 4.6 (H) 2.0 - 4.0 g/dL A-G Ratio 0.4 (L) 1.1 - 2.2 Assessment:  
No evidence of DVT. Active Problems: 
  SOB (shortness of breath) (12/4/2019) Pleural effusion (12/23/2019) Plan/Recommendations:  
tPA this morning via pleurX Will obtain consent from his daughter See orders Signed By: Sosa LLOYD

## 2020-02-11 NOTE — PROGRESS NOTES
Per dr Kristie Jackson pt may eat, see order 1005 pt placed on bed alarm, appears anxious and requesting to leave , puling at lines and tubes. Family provided sitter contniues to leave room, will clarify expectation pf private sitter  with family when they arrive

## 2020-02-11 NOTE — CONSULTS
Consult Subjective:  
 
Gunner Riddle is a 80 y.o.  male who is being seen for pleural effusion with pleurx catheter in place. Onset of symptoms was on Friday. [place] nurse was only able to obtain 50 ml from pleurx. I saw him this morning in clinic. The catheter flushes easily but will not drain much. About two weeks ago he was seen in the ED with the same issue and we instilled Cathflo and got it working. He is requiring NC oxygen now due to SOB. The PleurX was inserted on 19 for long term management of recurrent effusion due to B-cell Lymphoma. He is under the care of Dr Ellyn Hollingsworth and recently started on Rituximab. Past Medical History:  
Diagnosis Date  Arrhythmia   
 irrigular heart beat- A FIB  Cancer [facility])  COLON   
 Cancer ([facility] [address] SKIN  
 Colon cancer ([facility] [address] 2013  Heart failure ([facility] [address]  Hypertension Past Surgical History:  
Procedure Laterality Date  HX OTHER SURGICAL    
 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  
 HX TONSIL AND ADENOIDECTOMY AS CHILD Family History Problem Relation Age of Onset  Colon Cancer Mother  Lung Cancer Father  Cancer Brother UNKNOWN Social History Tobacco Use  Smoking status: Former Smoker Packs/day: 0.50 Years: 32.00 Pack years: 16.00 Types: Cigarettes Last attempt to quit: 1983 Years since quittin.6  Smokeless tobacco: Never Used Substance Use Topics  Alcohol use: Yes Comment: edie/gin 7 per week No current facility-administered medications for this encounter. Current Outpatient Medications Medication Sig  levoFLOXacin (LEVAQUIN) 500 mg tablet Take 1 Tab by mouth daily.  hydrALAZINE (APRESOLINE) 25 mg tablet Take 25 mg by mouth as needed. If Blood Pressure is 484 Systolic or higher  furosemide (LASIX) 40 mg tablet Take 40 mg by mouth two (2) times a day.  loperamide (IMODIUM) 2 mg capsule Take 2 mg by mouth daily as needed.  potassium chloride SR (KLOR-CON 10) 10 mEq tablet Take 20 mEq by mouth daily.  guaiFENesin-dextromethorphan (ROBAFEN DM COUGH)  mg/5 mL liqd Take 30 mL by mouth every four (4) hours as needed.  diphenhydrAMINE (BENADRYL ALLERGY) 25 mg tablet Take 12.5 mg by mouth nightly as needed for Sleep.  apixaban (ELIQUIS) 2.5 mg tablet Take 2.5 mg by mouth two (2) times a day. Indications: Atrial Fibrillation  ferrous sulfate 325 mg (65 mg iron) tablet Take 325 mg by mouth two (2) times a day.  levothyroxine (SYNTHROID) 175 mcg tablet Take 175 mcg by mouth Daily (before breakfast).  melatonin 10 mg tab Take 1 Tab by mouth nightly.  omeprazole (PRILOSEC) 20 mg capsule Take 20 mg by mouth two (2) times a day.  thiamine HCL (B-1) 100 mg tablet Take 100 mg by mouth daily.  triamcinolone acetonide (KENALOG) 0.1 % topical cream Apply  to affected area two (2) times daily as needed for Skin Irritation or Itching. use thin layer  cholecalciferol, vitamin D3, (VITAMIN D3) 2,000 unit tab Take 1 Tab by mouth daily.  dutasteride (AVODART) 0.5 mg capsule Take 0.5 mg by mouth daily.  terazosin (HYTRIN) 10 mg capsule Take 10 mg by mouth daily. Indications: high blood pressure No Known Allergies Review of Systems: 
Pertinent issues are in history of present illness Objective: Intake and Output:   
No intake/output data recorded. No intake/output data recorded. Physical Exam:  
General: awake, cooperative, no distress, appears his stated age. Lung: diminished breath sounds bilateral bases. Pleurx catheter in place on left side. Heart: regular rate and rhythm, Abdomen: soft, non tender. Bowel sounds normal Extremities: normal, no edema. Data Review:  
Recent Results (from the past 24 hour(s)) EKG, 12 LEAD, INITIAL Collection Time: 02/10/20 12:06 PM  
Result Value Ref Range Ventricular Rate 70 BPM  
 Atrial Rate 75 BPM  
 QRS Duration 72 ms Q-T Interval 444 ms QTC Calculation (Bezet) 479 ms Calculated R Axis -59 degrees Calculated T Axis 53 degrees Diagnosis Atrial fibrillation Left axis deviation Anteroseptal infarct (cited on or before 25-JUL-2019) When compared with ECG of 08-FEB-2020 10:53, No significant change was found Confirmed by Kassandra Esposito MD ([id number]) on 2/10/2020 4:22:59 PM 
  
SAMPLES BEING HELD Collection Time: 02/10/20 12:13 PM  
Result Value Ref Range SAMPLES BEING HELD 1RED 1BLU   
 COMMENT Add-on orders for these samples will be processed based on acceptable specimen integrity and analyte stability, which may vary by analyte. CBC WITH AUTOMATED DIFF Collection Time: 02/10/20 12:13 PM  
Result Value Ref Range WBC 10.2 4.1 - 11.1 K/uL  
 RBC 3.07 (L) 4.10 - 5.70 M/uL HGB 8.9 (L) 12.1 - 17.0 g/dL HCT 30.5 (L) 36.6 - 50.3 % MCV 99.3 (H) 80.0 - 99.0 FL  
 MCH 29.0 26.0 - 34.0 PG  
 MCHC 29.2 (L) 30.0 - 36.5 g/dL  
 RDW 15.5 (H) 11.5 - 14.5 % PLATELET 907 412 - 924 K/uL MPV 9.0 8.9 - 12.9 FL  
 NRBC 0.0 0  WBC ABSOLUTE NRBC 0.00 0.00 - 0.01 K/uL NEUTROPHILS 89 (H) 32 - 75 % LYMPHOCYTES 4 (L) 12 - 49 % MONOCYTES 5 5 - 13 % EOSINOPHILS 1 0 - 7 % BASOPHILS 0 0 - 1 % IMMATURE GRANULOCYTES 1 (H) 0.0 - 0.5 % ABS. NEUTROPHILS 9.1 (H) 1.8 - 8.0 K/UL  
 ABS. LYMPHOCYTES 0.4 (L) 0.8 - 3.5 K/UL  
 ABS. MONOCYTES 0.5 0.0 - 1.0 K/UL  
 ABS. EOSINOPHILS 0.1 0.0 - 0.4 K/UL  
 ABS. BASOPHILS 0.0 0.0 - 0.1 K/UL  
 ABS. IMM. GRANS. 0.1 (H) 0.00 - 0.04 K/UL  
 DF SMEAR SCANNED    
 RBC COMMENTS ANISOCYTOSIS 1+ 
    
 RBC COMMENTS MACROCYTOSIS 
1+ METABOLIC PANEL, COMPREHENSIVE Collection Time: 02/10/20 12:13 PM  
Result Value Ref Range Sodium 138 136 - 145 mmol/L Potassium 4.9 3.5 - 5.1 mmol/L  Chloride 103 97 - 108 mmol/L  
 CO2 32 21 - 32 mmol/L  
 Anion gap 3 (L) 5 - 15 mmol/L Glucose 181 (H) 65 - 100 mg/dL BUN 36 (H) 6 - 20 MG/DL Creatinine 1.47 (H) 0.70 - 1.30 MG/DL  
 BUN/Creatinine ratio 24 (H) 12 - 20 GFR est AA 54 (L) >60 ml/min/1.73m2 GFR est non-AA 45 (L) >60 ml/min/1.73m2 Calcium 8.5 8.5 - 10.1 MG/DL Bilirubin, total 0.3 0.2 - 1.0 MG/DL  
 ALT (SGPT) 21 12 - 78 U/L  
 AST (SGOT) 17 15 - 37 U/L Alk. phosphatase 92 45 - 117 U/L Protein, total 6.6 6.4 - 8.2 g/dL Albumin 1.9 (L) 3.5 - 5.0 g/dL Globulin 4.7 (H) 2.0 - 4.0 g/dL A-G Ratio 0.4 (L) 1.1 - 2.2    
TROPONIN I Collection Time: 02/10/20 12:13 PM  
Result Value Ref Range Troponin-I, Qt. <0.05 <0.05 ng/mL Chest CT done on 2/8/20 reviewed Assessment:  
 
Active Problems: 
  Pleural effusion (12/23/2019) Plan:  
Recommend admitting him to Hospitalist service We will start tPa tomorrow, spoke with both daughters concerning this. No need for thoracentesis tonight. CBC, PT/INR tonight or in AM 
 
 
Signed By: Nabil Celeste NP February 10, 2020

## 2020-02-11 NOTE — PROGRESS NOTES
Day #3 of Levaquin-PTA Indication:  CAP Current regimen:  500 mg oral q24h Abx regimen: Levaquin Recent Labs  
  02/10/20 
1213 20 
1100 WBC 10.2 10.1 CREA 1.47* 1.41* BUN 36* 39* Est CrCl: 33 ml/min Temp (24hrs), Av.1 °F (36.7 °C), Min:98 °F (36.7 °C), Max:98.1 °F (36.7 °C) Cultures: none Plan: Change to 250 mg oral daily

## 2020-02-11 NOTE — PROGRESS NOTES
[facility] Adult  Hospitalist Group Hospitalist Progress Note Kerri Ward MD 
Answering service: [phone] OR [phone] from in house phone Date of Service:  2020 NAME:  Rob Lopez :  1926 MRN:  [id number] Admission Summary:  
Rob Lopez is a 80 y.o. male who with history of B-cell lymphoma and recurrent pleural effusions and after Pleurx drain placement presents from thoracic surgery office because of shortness of breath he went to Dr. Cassius Cowden clinic today and seen by the PA and noticed to have clogged tube and worsening pleural effusion. He was symptomatic from that. The PA attempted to flush the tube but it did not work. He is getting admitted here to get TPA through his tube. Daughter at bedside that helps with history. Otherwise no nausea vomiting diarrhea or any fevers Interval history / Subjective:  
Resting comfortably d/w thoracic surgery Assessment & Plan: 1. Acute hypoxic resp failure due to Recurrent pleural effusion. From Clogged Pleurx catheter. Plan to give TPA by thoracic surgery. Admit to telemetry. All postsurgical unit. 
  
2 B-cell lymphoma On palliative chemotherapy Rituxan. 
  
3 Chronic diastolic CHF 
  
4. Paroxysmal A. Fib He is on Eliquis. 5. CKD  STAGE 3  STABLE 
 
6. Encephalopathy from hypoxia?  
  
Code status: DNR  
DVT prophylaxis: On eliquis Care Plan discussed with: Patient/Family and Nurse Anticipated Disposition: SNF/LTC Anticipated Discharge: Greater than 48 hours Hospital Problems  Date Reviewed: 2/10/2020 Codes Class Noted POA Pleural effusion ICD-10-CM: J90 ICD-9-CM: 511.9  2019 Unknown  
   
 SOB (shortness of breath) ICD-10-CM: R06.02 
ICD-9-CM: 786.05  2019 Unknown Review of Systems:  
Review of systems not obtained due to patient factors. Vital Signs:  
 Last 24hrs VS reviewed since prior progress note. Most recent are: 
Visit Vitals /71 (BP 1 Location: Left arm, BP Patient Position: At rest) Pulse 66 Temp 97.8 °F (36.6 °C) Resp 17 Ht 6' 1\" (1.854 m) Wt 79.7 kg (175 lb 11.3 oz) SpO2 100% BMI 23.18 kg/m² No intake or output data in the 24 hours ending 02/11/20 0850 Physical Examination:  
 
 
     
Constitutional:  No acute distress, cooperative, pleasant ENT:  Oral mucosa moist, oropharynx benign. Resp:  CTA bilaterally. No wheezing/rhonchi/rales. No accessory muscle use CV:  Regular rhythm, normal rate, no murmurs, gallops, rubs GI:  Soft, non distended, non tender. normoactive bowel sounds, no hepatosplenomegaly Musculoskeletal:  No edema, warm, 2+ pulses throughout Neurologic:  Moves all extremities. AAOx3, CN II-XII reviewed Psych:  Good insight, Not anxious nor agitated. Data Review:  
 I personally reviewed  Image and labs Xr Chest [facility] Result Date: 2/10/2020 IMPRESSION: 1. No interval change in large left pleural effusion 2. Small right pleural effusion and basilar atelectasis unchanged as well Labs:  
 
Recent Labs  
  02/11/20 
0434 02/10/20 
1213 WBC 9.5 10.2 HGB 8.8* 8.9* HCT 29.9* 30.5*  221 Recent Labs  
  02/11/20 
0434 02/10/20 
1213 02/08/20 
1100  138 140  
K 4.9 4.9 4.9  
 103 108 CO2 30 32 33* BUN 37* 36* 39* CREA 1.41* 1.47* 1.41* * 181* 151* CA 8.7 8.5 8.9 Recent Labs  
  02/11/20 
0434 02/10/20 
1213 02/08/20 
1100 SGOT 18 17 18 ALT 19 21 20 AP 86 92 98 TBILI 0.2 0.3 0.3 TP 6.5 6.6 6.7 ALB 1.9* 1.9* 1.9*  
GLOB 4.6* 4.7* 4.8* No results for input(s): INR, PTP, APTT, INREXT in the last 72 hours. No results for input(s): FE, TIBC, PSAT, FERR in the last 72 hours. Lab Results Component Value Date/Time  Folate 9.8 07/26/2019 06:03 AM  
  
 No results for input(s): PH, PCO2, PO2 in the last 72 hours. Recent Labs  
  02/10/20 
1213 02/08/20 
1100 TROIQ <0.05 <0.05 Lab Results Component Value Date/Time Cholesterol, total 142 07/26/2019 06:03 AM  
 HDL Cholesterol 79 07/26/2019 06:03 AM  
 LDL, calculated 56.2 07/26/2019 06:03 AM  
 Triglyceride 34 07/26/2019 06:03 AM  
 CHOL/HDL Ratio 1.8 07/26/2019 06:03 AM  
 
Lab Results Component Value Date/Time Glucose (POC) 148 (H) 07/11/2013 09:36 PM  
 
Lab Results Component Value Date/Time Color YELLOW/STRAW 02/08/2020 01:01 PM  
 Appearance CLEAR 02/08/2020 01:01 PM  
 Specific gravity 1.016 02/08/2020 01:01 PM  
 pH (UA) 5.0 02/08/2020 01:01 PM  
 Protein 30 (A) 02/08/2020 01:01 PM  
 Glucose NEGATIVE  02/08/2020 01:01 PM  
 Ketone NEGATIVE  02/08/2020 01:01 PM  
 Bilirubin NEGATIVE  02/08/2020 01:01 PM  
 Urobilinogen 0.2 02/08/2020 01:01 PM  
 Nitrites NEGATIVE  02/08/2020 01:01 PM  
 Leukocyte Esterase NEGATIVE  02/08/2020 01:01 PM  
 Epithelial cells FEW 02/08/2020 01:01 PM  
 Bacteria NEGATIVE  02/08/2020 01:01 PM  
 WBC 0-4 02/08/2020 01:01 PM  
 RBC 0-5 02/08/2020 01:01 PM  
 
 
 
Medications Reviewed:  
 
Current Facility-Administered Medications Medication Dose Route Frequency  levoFLOXacin (LEVAQUIN) tablet 250 mg  250 mg Oral DAILY  alteplase 10 mg in ns intraPLEUral soln   IntraPLEUral Q24H  
 dornase quan 5 mg in sterile water intraPLEUral soln   IntraPLEUral Q24H  hydrALAZINE (APRESOLINE) tablet 25 mg  25 mg Oral TID  guaiFENesin-dextromethorphan (ROBITUSSIN DM) 100-10 mg/5 mL syrup 10 mL  10 mL Oral Q4H PRN  
 doxazosin (CARDURA) tablet 1 mg  1 mg Oral DAILY  dutasteride (AVODART) capsule 0.5 mg  0.5 mg Oral DAILY  furosemide (LASIX) tablet 40 mg  40 mg Oral BID  levothyroxine (SYNTHROID) tablet 175 mcg  175 mcg Oral ACB  sodium chloride (NS) flush 5-40 mL  5-40 mL IntraVENous Q8H  
 sodium chloride (NS) flush 5-40 mL  5-40 mL IntraVENous PRN  
  acetaminophen (TYLENOL) tablet 650 mg  650 mg Oral Q4H PRN  
 oxyCODONE-acetaminophen (PERCOCET) 5-325 mg per tablet 1 Tab  1 Tab Oral Q4H PRN  
 ondansetron (ZOFRAN) injection 4 mg  4 mg IntraVENous Q4H PRN  
 naloxone (NARCAN) injection 0.4 mg  0.4 mg IntraVENous PRN  
 
______________________________________________________________________ EXPECTED LENGTH OF STAY: - - - 
ACTUAL LENGTH OF STAY:          0 Tj Ortega MD

## 2020-02-11 NOTE — ADVANCED PRACTICE NURSE
Spoke with [employer] via phone at [phone]. Verbal consent obtained for instillation of fibrinolytic agents via the pleurX catheter.

## 2020-02-11 NOTE — ROUTINE PROCESS
TRANSFER - OUT REPORT: 
 
Verbal report given to Robel Ventura RN(name) on Kristen Wilkerson  being transferred to [address] [place] [address] [address](unit) for routine progression of care Report consisted of patients Situation, Background, Assessment and  
Recommendations(SBAR). Information from the following report(s) SBAR, ED Summary, Intake/Output, MAR, Recent Results and Cardiac Rhythm A fib was reviewed with the receiving nurse. Lines:  
Peripheral IV 02/10/20 Right Forearm (Active) Site Assessment Clean, dry, & intact 2/10/2020  8:00 PM  
Phlebitis Assessment 0 2/10/2020  8:00 PM  
Infiltration Assessment 0 2/10/2020  8:00 PM  
Dressing Status Clean, dry, & intact 2/10/2020  8:00 PM  
Dressing Type Transparent 2/10/2020  8:00 PM  
Hub Color/Line Status Pink 2/10/2020  8:00 PM  
Alcohol Cap Used No 2/10/2020  8:00 PM  
  
 
Opportunity for questions and clarification was provided. Patient transported with: 
 transport

## 2020-02-11 NOTE — PROGRESS NOTES
TRANSFER - IN REPORT: 
 
Verbal report received from [address] [place] [address] RN(name) on aCthy Barker  being received from ER(unit) for routine progression of care Report consisted of patients Situation, Background, Assessment and  
Recommendations(SBAR). Information from the following report(s) SBAR, Kardex, ED Summary, MAR, Accordion, Recent Results and Cardiac Rhythm A-Fib was reviewed with the receiving nurse. Opportunity for questions and clarification was provided. Assessment completed upon patients arrival to unit and care assumed. Primary Nurse Renzo Gaxiola and Ryan Arnold RN performed a dual skin assessment on this patient Impairment noted- Patient has scattered bruising , 3 small abrasions on his back, what appears to be a small wart/skin tag on L had, and patient was admitted with a L Pleurx drain site. Yasmany score is 17 Bedside shift change report given to Jane Ceballos RN (oncoming nurse) by Stephen Blackmon RN (offgoing nurse). Report included the following information SBAR, Kardex, ED Summary, Intake/Output, MAR, Accordion, Recent Results and Cardiac Rhythm A-Fib.

## 2020-02-11 NOTE — PROGRESS NOTES
Attempted to call report, room not clean, will call back 1921 room status showing in progress for cleaning 1931 room status showing in progress for cleaning 2004 TRANSFER - OUT REPORT: 
 
Verbal report given to Torie(name) on Mg Miu  being transferred to (unit) for routine progression of care Report consisted of patients Situation, Background, Assessment and  
Recommendations(SBAR). Information from the following report(s) SBAR, Kardex, Intake/Output, MAR and Recent Results was reviewed with the receiving nurse. Lines:  
Peripheral IV 02/10/20 Right Forearm (Active) Site Assessment Clean, dry, & intact 2/11/2020  8:24 AM  
Phlebitis Assessment 0 2/11/2020  8:24 AM  
Infiltration Assessment 0 2/11/2020  8:24 AM  
Dressing Status Clean, dry, & intact 2/11/2020  8:24 AM  
Dressing Type Transparent;Tape 2/11/2020  8:24 AM  
Hub Color/Line Status Pink;Flushed 2/11/2020  8:24 AM  
Action Taken Open ports on tubing capped 2/11/2020  8:24 AM  
Alcohol Cap Used Yes 2/11/2020  8:24 AM  
  
 
Opportunity for questions and clarification was provided. Patient transported with: 
 [facility]

## 2020-02-11 NOTE — PROCEDURES
After consent obtained and time out was done. Under sterile technique 10 mg Alteplase in 30 ml Normal Saline was installed via pleur-X catheter at 1115. The tube was clamped until 1220 then opened and attached to pleur-a-vac on -20 cm suction. He drained 550 ml of cherry colored fluid over the following hour. At 1330 5 mg Dornase in 30 ml Sterile water was instilled under sterile technique. Again the tube was clamped an hour until 1430 then opened on suction with an initial drainage of 150 ml of cherry clear fluid. He tolerated both instillations well, slept through them.

## 2020-02-11 NOTE — PROGRESS NOTES
Reason for Admission:   Admitted from Thoracic MD office with shortness of breath and malfunctioning pleurx tube. Has history of lymphoma and currently being treated with Rituxan. RUR Score:     28% Resources/supports as identified by patient/family:   Daughters and staff at [facility]. Top Challenges facing patient (as identified by patient/family and CM): Finances/Medication cost?      None Transportation? Family provides Support system or lack thereof? Family and staff at assisted living facility Living arrangements? Lives in [facility]. [facility] Self-care/ADLs/Cognition? Needs little assist with ADLs oriented x3 Current Advanced Directive/Advance Care Plan:  Does have AMD but not here. Requested that a copy to update medical record Plan for utilizing home health: If skilled services needed at discharge, she can be admitted to healthcare section. Spoke with Dori Israel, clinical liaison [phone] and updated on patient's contact. Transition of Care Plan:   
Pleurx catheter- alteplase instilled in catheter Once catheter is patent and he is stable anticipate that he will discharge back to [facility]. Care Management Interventions PCP Verified by CM: Yes(Dr Funmi Rivera) Palliative Care Criteria Met (RRAT>21 & CHF Dx)?: No 
Mode of Transport at Discharge: (private car) Transition of Care Consult (CM Consult): Assisted Living(Resides in [address] [place] [address] in [address] [facility]) Confirm Follow Up Transport: Family The Plan for Transition of Care is Related to the Following Treatment Goals : (able to drain pleurex cath) The Patient and/or Patient Representative was Provided with a Choice of Provider and Agrees with the Discharge Plan?: (Daughter Terrance) [address] Provided?: No 
 
Charles Rajan RN CRM Ext [phone]

## 2020-02-12 NOTE — PROGRESS NOTES
Problem: Falls - Risk of 
Goal: *Absence of Falls Description Document Tia Manus Fall Risk and appropriate interventions in the flowsheet. Outcome: Progressing Towards Goal 
Note: Fall Risk Interventions: 
Mobility Interventions: Patient to call before getting OOB, Communicate number of staff needed for ambulation/transfer, Bed/chair exit alarm Mentation Interventions: Bed/chair exit alarm, Door open when patient unattended, Adequate sleep, hydration, pain control, Increase mobility, More frequent rounding, Reorient patient Medication Interventions: Patient to call before getting OOB, Teach patient to arise slowly Elimination Interventions: Call light in reach, Patient to call for help with toileting needs, Bed/chair exit alarm, Stay With Me (per policy), Urinal in reach, Toileting schedule/hourly rounds Problem: Heart Failure: Day 1 Goal: Activity/Safety Outcome: Progressing Towards Goal 
Goal: Consults, if ordered Outcome: Progressing Towards Goal 
Goal: Diagnostic Test/Procedures Outcome: Progressing Towards Goal 
Goal: Nutrition/Diet Outcome: Progressing Towards Goal 
Goal: Discharge Planning Outcome: Progressing Towards Goal 
Goal: Medications Outcome: Progressing Towards Goal 
Goal: Respiratory Outcome: Progressing Towards Goal 
Goal: Treatments/Interventions/Procedures Outcome: Progressing Towards Goal 
Goal: Psychosocial 
Outcome: Progressing Towards Goal 
Goal: *Oxygen saturation within defined limits Outcome: Progressing Towards Goal 
Goal: *Hemodynamically stable Outcome: Progressing Towards Goal 
Goal: *Optimal pain control at patient's stated goal 
Outcome: Progressing Towards Goal 
Goal: *Anxiety reduced or absent Outcome: Progressing Towards Goal

## 2020-02-12 NOTE — PROGRESS NOTES
Critical Care Documentation Name: Venita Nieves YOB: 1926 MRN: [id number] Admission Date: 2/10/2020  4:14 PM 
 
Date of service: 2/12/2020 Active Diagnoses: 
 
Hospital Problems  Date Reviewed: 2/10/2020 Codes Class Noted POA Pleural effusion ICD-10-CM: J90 ICD-9-CM: 511.9  12/23/2019 Unknown  
   
 SOB (shortness of breath) ICD-10-CM: R06.02 
ICD-9-CM: 786.05  12/4/2019 Unknown Chief Complaint: 
Respiratory distress/failure Clinical Presentation: 
Rapid response called at 0037 by primary RN Jh Gagnon. Patient had been drinking sips of water, RN reports he experienced difficulty swallowing, concerned he may have aspirated. Shortly thereafter, patient transitioned to minimal respiratory effort, bradycardia down to 30s. Non responsive. Spoke to daughters Leeroy Sykes and Dougherty on the phone, informed them of rapid decline in condition. Explained that patient is listed as DNR, would respect his code status but treat medically. Informed them that death may be imminent and they should come to the hospital if possible. Call from daughters received, wish for chest compressions to be performed. Verified with rafi Sykes what this entails, including the other components of ACLS including medications, defibrillation, and intubation. Family wishes for ACLS measures excluding intubation. Code blue was called at 0051. Multiple rounds of chest compressions and medications provided. Medications included epinephrine, amiodarone, and sodium bicarbonate. During the code, rhythm converted from PEA to V-Tach and he was defibrillated. See nursing documentation for full code notes. ROSC was achieved as family arrived. The patient's condition was explained to daughter and her , that he did have a pulse but without intubation in order to oxygenate his body, it would likely not sustain. Code team exited room, family remained at bedside. Patient lost pulse and converted to asystole at 01:23AM. No response to verbal and tactile stimuli. No respiratory effort. Absent heart sounds and pulses. Pupils fixed and dilated. Patient pronounced dead at 1:23 AM hours.  was paged at start of code for support. Physical Exam:  
Physical Exam 
Vitals signs reviewed. Constitutional:   
   General: He is in acute distress. Appearance: He is ill-appearing. HENT:  
   Head: Normocephalic and atraumatic. Mouth/Throat:  
   Mouth: Mucous membranes are dry. Cardiovascular:  
   Rate and Rhythm: Regular rhythm. Bradycardia present. Comments: Bradycardic, very faint heart sounds Pulmonary:  
   Comments: Minimal respiratory effort, agonal breathing Chest tube in place on left chest wall Abdominal:  
   Palpations: Abdomen is soft. Skin: 
   General: Skin is warm and dry. Coloration: Skin is pale. Neurological:  
   Comments: Non responsive Data Reviewed: All diagnostic labs and studies have been reviewed.  CXR Pending Prior labs from the day reviewed Assessment and Plan: 
Respiratory arrest with imminent cardiac arrest 
Unclear etiology, given report from nurse of the moments leading up suspect possible aspiration Code blue called for respiratory failure with imminent cardiac arrest. 
Family converted from DNR to partial code - excluding intubation ACLS measures: Chest compressions, ACLS medications (epinephrine, sodium bicarbonate, amiodarone), and defibrillation ROSC was achieved as family arrived, patient's condition explained. Patient became pulseless and asystolic at 6123. Death pronounced and family notified at bedside. Medications Administered:  
Sedation: [ ] yes [x ] no Anxiolytics: [ ] yes [x ] no Antiarrhythmics: [x ] yes [ ] no Antihypertensives: [ ] yes [x ] no  
Pressors: [ ] yes [x ] no IVF's: [ ] yes [ x] no  
 
 
 Critical Care Attestation: This patient is unstable and critically ill. Due to a high probability of clinically significant, life threatening deterioration, the patient required my highest level of preparedness to intervene emergently and I personally spent this critical care time directly and personally managing the patient. This critical care time included obtaining a history; examining the patient; pulse oximetry; ordering and review of studies; arranging urgent treatment with development of a management plan; evaluation of patient's response to treatment; frequent reassessment; and, discussions with other providers and/or family. This critical care time was performed to assess and manage the high probability of imminent, life-threatening deterioration that could result in multi-organ failure and death. It was exclusive of separately billable procedures, treating other patients, and teaching time. Time Spent:  
 
I personally spent 41 minutes in providing critical care time.  
 
Aura Oropeza NP 
2/12/2020 
1:29 AM

## 2020-02-12 NOTE — PROGRESS NOTES
Spiritual Care Assessment/Progress Note [address] 
 
 
NAME: Sindy Irizarry      MRN: [id number] AGE: 80 y.o. SEX: male Jew Affiliation: [facility] Language: [place] 2/12/2020     Total Time (in minutes): 35  
 
Spiritual Assessment begun in [address] [facility] [address] through conversation with: 
  
    []Patient        [x] Family    [] Friend(s) Reason for Consult: Death, Inpatient Spiritual beliefs: (Please include comment if needed) [x] Identifies with a sandi tradition: Presbyterian    
   [] Supported by a sandi community:        
   [] Claims no spiritual orientation:       
   [] Seeking spiritual identity:            
   [] Adheres to an individual form of spirituality:       
   [] Not able to assess:                   
 
    
Identified resources for coping:  
   [] Prayer                           
   [] Music                  [] Guided Imagery [x] Family/friends                 [] Pet visits [] Devotional reading                         [] Unknown 
   [] Other:                                          
 
 
Interventions offered during this visit: (See comments for more details) Family/Friend(s): Affirmation of emotions/emotional suffering, Affirmation of sandi, Catharsis/review of pertinent events in supportive environment, Coping skills reviewed/reinforced, End of life issues discussed, Iconic (affirming the presence of God/Higher Power), Normalization of emotional/spiritual concerns(Family) Plan of Care: 
 
 [] Support spiritual and/or cultural needs  
 [] Support AMD and/or advance care planning process [x] Support grieving process 
 [] Coordinate Rites and/or Rituals  
 [] Coordination with community clergy [] No spiritual needs identified at this time 
 [] Detailed Plan of Care below (See Comments)  [] Make referral to Music Therapy 
[] Make referral to Pet Therapy    
[] Make referral to Addiction services [] Make referral to [facility] 
[] Make referral to Spiritual Care Partner 
[] No future visits requested       
[x] Follow up visits as needed Comments:  requested, patient passed, family at bedside. Patient's two daughters and son-in-law (MARTIN) at bedside speaking to the physician about the patient and the events which transpired. Afterward provided spiritual presence and listening as they spoke of their current thoughts, feelings, emotions, and concerns. Expressing feelings of grief saying they are still a little numb and in shock by the patient's passing. At the same time, say they are glad they were able to see him and spend time with him earlier this evening. Are especially glad that their mother was able to be with him tonight when they visited earlier. They said they are comfortable and not in need of anything in particular at this time, they wish to take their time and be together with their father and one another as they care for and comfort one another and reflect on their time and life together with him. They said they had not spoken about a  home up to this point, but would make a decision and inform the staff once they decided. They appeared comforted and encouraged as a result of this visit and expressed gratitude for this visit. The patient's caregiver hired by the family George Hahn?), who was caring for the patient at the time of this event, was outside of the room, tearful over the events. Provided spiritual presence and listening as she spoke of her present thoughts feelings, emotions, and concerns. The patient's MARTIN also stepped into the asif to comfort her, encourage her, and thank her for the care she had been providing and for making their loved one's last moments comfortable. The patient's daughters also expressed their gratitude which brought comfort to [facility].   Afterward, [facility] left for home when she felt more calm and able to drive. I passed the information concerning the  home to the nurse. She said she would provide the family with the contact information concerning who to call once they obtained that information. Visited by Rev. Jorge Connelly MDiv, [facility], [facility]  paging service: [phone] ()

## 2020-02-13 ENCOUNTER — HOSPITAL ENCOUNTER (OUTPATIENT)
Dept: INFUSION THERAPY | Age: 85
End: 2020-02-13
Payer: MEDICARE

## 2020-02-20 ENCOUNTER — APPOINTMENT (OUTPATIENT)
Dept: INFUSION THERAPY | Age: 85
End: 2020-02-20
Payer: MEDICARE

## 2020-02-22 NOTE — DISCHARGE SUMMARY
Discharge Summary PATIENT ID: Leticia Rasmussen MRN: [id number] YOB: 1926 DATE OF ADMISSION: 2/10/2020  4:14 PM   
DATE OF DISCHARGE: 20 PRIMARY CARE PROVIDER: Derrick Donis DO  
 
ATTENDING PHYSICIAN: Michell Almaraz DISCHARGING PROVIDER: Luis Miguel Pina MD   
To contact this individual call [phone] and ask the  to page. If unavailable ask to be transferred the Adult Hospitalist Department. CONSULTATIONS: None PROCEDURES/SURGERIES: * No surgery found * ADMITTING [address] [place] [address] COURSE:  
[place] [facility] a 80 y. o. male who with history of B-cell lymphoma and recurrent pleural effusions and after Pleurx drain placement presents from thoracic surgery office because of shortness of breath he went to Dr. Wale Quinonez clinic today and seen by the PA and noticed to have clogged tube and worsening pleural effusion.  He was symptomatic from that.  The PA attempted to flush the tube but it did not work. Car León is getting admitted here to get TPA through his tube.  Daughter at bedside that helps with history.  Otherwise no nausea vomiting diarrhea or any fevers 
  
  
Assessment & Plan:  
  
1.  Recurrent pleural effusion. Clogged Pleurx catheter.   got TPA by thoracic surgery.   
2 B-cell lymphoma was On palliative chemotherapy Rituxan. 3 hypertension with , A. Fib was on eliquis DISCHARGE DIAGNOSES / PLAN:   
 
1.  pt  at after midnight ( see note from NP ) possibly due to aspiration leading to cardiac arrest   
 
ADDITIONAL CARE RECOMMENDATIONS:  
  
 
PENDING TEST RESULTS:  
At the time of discharge the following test results are still pending: FOLLOW UP APPOINTMENTS:   
Follow-up Information None DIET:  
Oral Nutritional Supplements:  
 
ACTIVITY:  
 
WOUND CARE:  
 
EQUIPMENT needed:  
 
 
DISCHARGE MEDICATIONS: 
Discharge Medication List as of 2020  5:05 AM  
  
 
 
 
NOTIFY YOUR PHYSICIAN FOR ANY OF THE FOLLOWING:  
 Fever over 101 degrees for 24 hours. Chest pain, shortness of breath, fever, chills, nausea, vomiting, diarrhea, change in mentation, falling, weakness, bleeding. Severe pain or pain not relieved by medications. Or, any other signs or symptoms that you may have questions about. DISPOSITION: 
  Home With: 
 OT  PT  HH  RN  
  
 Long term SNF/Inpatient Rehab Independent/assisted living Hospice  
x Other:  PATIENT CONDITION AT DISCHARGE:  
 
Functional status Poor Deconditioned Independent Cognition Yobani Shy Forgetful Dementia Catheters/lines (plus indication) William PICC   
 PEG None Code status Full code DNR   
 
 
 
 
CHRONIC MEDICAL DIAGNOSES: 
Problem List as of 2020 Date Reviewed: 2/10/2020 Codes Class Noted - Resolved Malignant lymphoma, undifferentiated cell, [facility]'s [facility] ICD-10-CM: C85.80 ICD-9-CM: 202.80  2020 - Present Diffuse large B-cell lymphoma ([facility] ICD-10-CM: C83.30 ICD-9-CM: 202.80  2020 - Present Dyspnea ICD-10-CM: R06.00 
ICD-9-CM: 786.09  2019 - Present Pleural effusion ICD-10-CM: J90 ICD-9-CM: 511.9  2019 - Present Acute on chronic systolic CHF (congestive heart failure) (HCC) ICD-10-CM: K14.44 ICD-9-CM: 428.23, 428.0  2019 - Present Colon cancer [facility]) ICD-10-CM: C18.9 ICD-9-CM: 153.9  2013 - Present SOB (shortness of breath) ICD-10-CM: R06.02 
ICD-9-CM: 786.05  2019 - Present Greater than 20 minutes were spent with the patient on counseling and coordination of care Signed:  
Chico Brewer MD 
2020 
9:58 AM

## 2020-02-24 NOTE — CDMP QUERY
RETRO QUERY #1 Pt admitted wit pleural effusion/Pt noted to have history of CHF. If possible, please document in the progress notes and d/c summary if you are evaluating and / or treating any of the following: 
 
? Chronic Diastolic CHF ? Diastolic CHF compensated 
? Other, please specify ? Clinically unable to determine The medical record reflects the following: 
  Risk Factors: HX of CHF Clinical Indicators:  
12/2019 
echo Result status: Final result · Left Ventricle: Normal cavity size and systolic function (ejection fraction normal). Moderate concentric hypertrophy. Estimated left ventricular ejection fraction is 56 - 60%. No regional wall motion abnormality noted. Left ventricular diastolic dysfunction. · Left Atrium: Mildly dilated left atrium. · Right Atrium: Moderately dilated right atrium. · Aortic Valve: Aortic valve sclerosis with stenosis. Aortic valve mean gradient is 15.5 mmHg. Aortic valve area is 1.7 cm2. Mild aortic valve stenosis is present. · Mitral Valve: Mitral valve thickening. Myxomatous mitral valve disease. Mild mitral valve regurgitation is present. · Tricuspid Valve: Moderate tricuspid valve regurgitation is present. · IVC/Hepatic Veins: Severely elevated central venous pressure (15+ mmHg); IVC diameter is larger than 21 mm and collapses less than 50% with respiration. · Pulmonary Artery: Pulmonary hypertension. · Pulmonic Valve: Mild pulmonic valve regurgitation is present Treatment: lasix po Thank you, 
       Yuimko Muniz [address] [facility], [address] [facility]

## 2020-02-24 NOTE — CDMP QUERY
RETRO QUERY #5 Pt admitted with pleural effusion/Pt noted to have abnormal renal function labs. If possible, please document in the progress notes and d/c summary if you are evaluating and / or treating any of the following: ? Hypertensive CKD stage 3 (GFR 30-59) ? Acute Kidney Insufficiency ? Other, please specify ? Clinically unable to determine The medical record reflects the following: 
  Risk Factors: lasix po, HX of CHF Clinical Indicators:  
2/10/2020 12:13 
BUN: 36 (H) Creatinine: 1.47 (H) GFR est non-AA: 45 (L) 
 
2/11/2020 04:34 BUN: 37 (H) Creatinine: 1.41 (H) GFR est non-AA: 47 (L) Treatment: lab monitoring Thank you, 
       Harry De La Cruz [facility], [address] [facility]

## 2020-02-24 NOTE — CDMP QUERY
RETRO QUERY #4 Pt admitted with pleural effusion/Pt noted to have increased agitation. If possible, please document in the progress notes and d/c summary if you are evaluating and / or treating any of the following: ? Hypoxic Encephalopathy 
? Encephalopathy-unknown specificity ? Other, please specify ? Clinically unable to determine The medical record reflects the following: 
  Risk Factors: agitation Clinical Indicators:  
2/11-nurses note 1005 pt placed on bed alarm, appears anxious and requesting to leave , puling at lines and tubes. Family provided sitter contniues to leave room, will clarify expectation pf private sitter  with family when they arrive Treatment: sitter, supplemental oxygen 3 lts,high fall risk precautions Thank you, 
       John Beach [employer], [address]

## 2020-02-24 NOTE — CDMP QUERY
RETRO QUERY #3 Pt admitted with pleural effusion/Pt noted to have respiratory failure documented. If possible, please document in the progress notes and d/c summary if you are evaluating and / or treating any of the following: ? Acute Respiratory Failure with Hypoxia ? Acute Hypoxic Respiratory Failure 
? Other, please specify ? Clinically unable to determine The medical record reflects the following: 
  Risk Factors: pleural effusion Clinical Indicators:  
2/12-PN Chief Complaint: 
Respiratory distress/failure Patient had been drinking sips of water, RN reports he experienced difficulty swallowing, concerned he may have aspirated. Shortly thereafter, patient transitioned to minimal respiratory effort, bradycardia down to 30s. Non responsive Treatment: supplemental oxygen @ 3 lts, rapid response Thank you, 
       [facility], [address] [facility]

## 2020-02-24 NOTE — CDMP QUERY
RETRO QUERY #2 Pt admitted with pleural effusion/Pt noted to have history of A fib without documentation of type If possible, please document in the progress notes and d/c summary if you are evaluating and / or treating any of the following: 
 
? Paroxysmal A fib ? Chronic A fib 
? Other, please specify ? Clinically unable to determine The medical record reflects the following: 
  Risk Factors: HX of A fib Clinical Indicatorstreatments 2/11-PN 
A. Fib He is on Eliquis I would hold for now until he gets the TPA and if stable can resume his Eliquis in the morning. Thank you, 
       Laly Holman [facility], [address] [facility]

## 2020-02-27 ENCOUNTER — APPOINTMENT (OUTPATIENT)
Dept: INFUSION THERAPY | Age: 85
End: 2020-02-27
Payer: MEDICARE

## 2020-12-01 NOTE — PROGRESS NOTES
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 The echoendoscope was advanced to the upper GI tract without difficulty. The scope was slowly removed while detailed images of the adjacent organs were obtained. The patient was taken to the recovery area in stable condition. ENDOSCOPIC FINDINGS: There is a subepithelial bulge in the distal body, greater curve, ~1.5cm across. No ulceration or umbilication. Located 56cm from the incisors. Sonographically, it is hypoechoic, homogenous, with well defined smooth margins. It arises from the muscularis propria and is entirely within the gastric lumen. There is no extension into adjacent structures. No cystic spaces or hyper-echoic foci. It is 4x8mm in cross section, too small for FNA. No other lesions identified. Otherwise normal wall thickness and distensibility. OTHER ORGANS: There was no ascites in the upper abdomen. Limited views of the left lobe of the liver demonstrated no solid mass lesions. The celiac axis appears normal.  No significant celiac or darlene-gastric lymphadenopathy.   NL L adrenal.    Estimated blood loss: 0-minimal     PLAN:  Consider repeat EUS in 1 year to document stability  FU Dr Marco Mercado - 6 months    Zulay Mohan MD  [facility] [place] Lu Montenegro is a 80 y.o. male Chief Complaint Patient presents with  Follow-up B cell Lymphoma 1. Have you been to the ER, urgent care clinic since your last visit? Hospitalized since your last visit? No 
2. Have you seen or consulted any other health care providers outside of the [address] [place] [address] since your last visit? Include any pap smears or colon screening.  No

## 2021-11-05 NOTE — ANESTHESIA PREPROCEDURE EVALUATION
Relevant Problems No relevant active problems Anesthetic History No history of anesthetic complications Review of Systems / Medical History Nursing notes reviewed and pertinent labs reviewed Pulmonary Comments: plueral effusion Neuro/Psych Cardiovascular Hypertension Dysrhythmias Exercise tolerance: >4 METS 
  
GI/Hepatic/Renal 
  
 
 
 
 
 
 Endo/Other Within defined limits Other Findings Physical Exam 
 
Airway Mallampati: I 
TM Distance: > 6 cm Mouth opening: Normal 
 
 Cardiovascular Rhythm: regular Rate: normal 
 
 
 
 Dental 
 
Dentition: Poor dentition Pulmonary Abdominal 
 
 
 
 Other Findings Anesthetic Plan ASA: 3 Anesthesia type: MAC Induction: Intravenous Anesthetic plan and risks discussed with: Patient
Orientation to room/Bed in low position, brakes on/Side rails x 2 or 4 up, assess large gaps, such that a patient could get extremity or other body part entrapped, use additional safety procedures/Patient and family education available to parents and patient/Document fall prevention teaching and include in plan of care

## 2022-05-10 NOTE — PROCEDURES
[address] [facility] [address],  [address] [place], [address] 
[phone] 
____________________________________________________________ Operative Report Procedure: Instill via Chest Tube Agent for Fibrinolysis (CPT Code: 21307) Provider: Jojo Flores PA-C Preoperative Diagnosis / Indication: Malignant pleural effusion, left Postoperative Diagnosis: same Patient confirmed via armband Consent signed. Time-out performed. Left side chest tube identified. 10mg Atleplase/30ml NS instilled into left chest via PleurX catheter at 18:46pm 
 
Patient tolerated the procedure well. No immediate complications. Will drain in 1 hour 
 
__________________________________________ 
1/31/2020 
18:50pm 
 
Successfully drained 800ml clear, pink fluid from left chest via PleurX catheter. Patient tolerated procedure well. Breathing much improved. Signed By: DEBRA Love   
 January 31, 2020 Undermining Type: Entire Wound

## 2024-01-01 NOTE — CONSULTS
Problem: Infant Inpatient Plan of Care  Goal: Plan of Care Review  Outcome: Progressing     Problem:   Goal: Effective Oral Intake  Outcome: Progressing     Problem:   Goal: Optimal Level of Comfort and Activity  Outcome: Progressing     Problem: Breastfeeding  Goal: Effective Breastfeeding  Outcome: Progressing     Vital signs stable. Voiding and stooling.   Baby is breastfeeding and supplemented with human donor milk per maternal request.  achieves deep latch with few sucks and swallows. Reminded mom to ensure she is holding  close to sustain deep latch.   Mom reports nipple tenderness. Provided with lanolin and hydrogels.   Educated mom and dad on safe sleep practices.   Planning for 24 hour testing tomorrow morning. Parents request circumcision prior to discharge.    S Cardiology Consult Note Date of consult:  12/23/19 Date of admission: 12/23/2019 Primary Cardiologist: Dr Barrera Statham Physician Requesting consult: Dr Mila Matson / Reason for consult: afib, bradycardia History of the presenting illness: 
Dalton Rutherford is a 80 y.o. with worsening dyspnea and has been found to have a recurrent left pleural effusion. Past Medical History:  
Diagnosis Date  Arrhythmia   
 irrigular heart beat- A FIB  Cancer [facility]) 2013 COLON   
 Cancer ([facility] [address] SKIN  
 Colon cancer ([facility] [address] 7/1/2013  Heart failure ([facility] [address]  Hypertension Prior to Admission medications Medication Sig Start Date End Date Taking? Authorizing Provider  
verapamil ER (CALAN-SR) 180 mg CR tablet Take 90 mg by mouth daily. Yes Provider, Historical  
furosemide (LASIX) 40 mg tablet Take 40 mg by mouth two (2) times a day. Yes Provider, Historical  
loperamide (IMODIUM) 2 mg capsule Take 2 mg by mouth daily as needed. Yes Provider, Historical  
potassium chloride SR (KLOR-CON 10) 10 mEq tablet Take 20 mEq by mouth daily. Yes Provider, Historical  
guaiFENesin-dextromethorphan (ROBAFEN DM COUGH)  mg/5 mL liqd Take 30 mL by mouth every four (4) hours as needed. Yes Provider, Historical  
diphenhydrAMINE (BENADRYL ALLERGY) 25 mg tablet Take 12.5 mg by mouth nightly as needed for Sleep. Yes Provider, Historical  
apixaban (ELIQUIS) 2.5 mg tablet Take 2.5 mg by mouth two (2) times a day. Indications: Atrial Fibrillation   Yes Provider, Historical  
ferrous sulfate 325 mg (65 mg iron) tablet Take 325 mg by mouth two (2) times a day. Yes Provider, Historical  
levothyroxine (SYNTHROID) 175 mcg tablet Take 175 mcg by mouth Daily (before breakfast). Yes Provider, Historical  
melatonin 10 mg tab Take 1 Tab by mouth nightly. Yes Provider, Historical  
omeprazole (PRILOSEC) 20 mg capsule Take 20 mg by mouth two (2) times a day.    Yes Provider, Historical  
 thiamine HCL (B-1) 100 mg tablet Take 100 mg by mouth daily. Yes Provider, Historical  
triamcinolone acetonide (KENALOG) 0.1 % topical cream Apply  to affected area two (2) times daily as needed for Skin Irritation or Itching. use thin layer   Yes Provider, Historical  
cholecalciferol, vitamin D3, (VITAMIN D3) 2,000 unit tab Take 1 Tab by mouth daily. Yes Provider, Historical  
dutasteride (AVODART) 0.5 mg capsule Take 0.5 mg by mouth daily. Yes Provider, Historical  
terazosin (HYTRIN) 10 mg capsule Take 10 mg by mouth daily. Indications: high blood pressure   Yes Provider, Historical  
 
 
No Known Allergies Family History Problem Relation Age of Onset  Colon Cancer Mother  Lung Cancer Father  Cancer Brother UNKNOWN Social History Socioeconomic History  Marital status:  Spouse name: Not on file  Number of children: Not on file  Years of education: Not on file  Highest education level: Not on file Occupational History  Not on file Social Needs  Financial resource strain: Not on file  Food insecurity:  
  Worry: Not on file Inability: Not on file  Transportation needs:  
  Medical: Not on file Non-medical: Not on file Tobacco Use  Smoking status: Former Smoker Packs/day: 0.50 Years: 32.00 Pack years: 16.00 Types: Cigarettes Last attempt to quit: 1983 Years since quittin.5  Smokeless tobacco: Never Used Substance and Sexual Activity  Alcohol use: Yes Comment: edie/gin 7 per week  Drug use: No  
 Sexual activity: Not on file Lifestyle  Physical activity:  
  Days per week: Not on file Minutes per session: Not on file  Stress: Not on file Relationships  Social connections:  
  Talks on phone: Not on file Gets together: Not on file Attends Restoration service: Not on file Active member of club or organization: Not on file Attends meetings of clubs or organizations: Not on file Relationship status: Not on file  Intimate partner violence:  
  Fear of current or ex partner: Not on file Emotionally abused: Not on file Physically abused: Not on file Forced sexual activity: Not on file Other Topics Concern  Not on file Social History Narrative  Not on file Review of Systems Unable to perform ROS: Medical condition Visit Vitals /71 (BP 1 Location: Right arm) Pulse (!) 50 Temp 98 °F (36.7 °C) Resp 16 Ht 6' 2\" (1.88 m) Wt 72.6 kg (160 lb) SpO2 90% BMI 20.54 kg/m² Physical Exam 
Constitutional:   
   Appearance: He is underweight. He is ill-appearing. HENT:  
   Head: Normocephalic and atraumatic. Eyes:  
   General: No scleral icterus. Conjunctiva/sclera: Conjunctivae normal.  
Neck:  
   Vascular: No JVD. Cardiovascular:  
   Rate and Rhythm: Bradycardia present. Rhythm regularly irregular. Heart sounds: Murmur present. Systolic murmur present with a grade of 2/6. No gallop. Pulmonary:  
   Effort: Pulmonary effort is normal. No respiratory distress. Breath sounds: Normal breath sounds. No stridor. No wheezing. Abdominal:  
   General: There is no distension. Palpations: Abdomen is soft. Musculoskeletal: Normal range of motion. General: No deformity. Skin: 
   General: Skin is warm and dry. Neurological:  
   Mental Status: He is oriented to person, place, and time. He is lethargic. Psychiatric:     
   Mood and Affect: Mood and affect normal.  
 
 
 
Lab review: 
BMP:  
Lab Results Component Value Date/Time   (H) 12/23/2019 09:45 AM  
 K 4.3 12/23/2019 09:45 AM  
  (H) 12/23/2019 09:45 AM  
 CO2 28 12/23/2019 09:45 AM  
 AGAP 6 12/23/2019 09:45 AM  
  (H) 12/23/2019 09:45 AM  
 BUN 55 (H) 12/23/2019 09:45 AM  
 CREA 2.35 (H) 12/23/2019 09:45 AM  
 GFRAA 32 (L) 12/23/2019 09:45 AM  
 GFRNA 26 (L) 12/23/2019 09:45 AM  
  
 
CBC: 
Lab Results Component Value Date/Time WBC 5.2 12/23/2019 09:45 AM  
 HGB 9.5 (L) 12/23/2019 09:45 AM  
 HCT 31.3 (L) 12/23/2019 09:45 AM  
 PLATELET 292 (L) 80/42/7234 09:45 AM  
 .0 (H) 12/23/2019 09:45 AM  
 
 
All Cardiac Markers in the last 24 hours:   
Lab Results Component Value Date/Time Sid Head <0.05 12/23/2019 09:45 AM  
 
 
Lab Results Component Value Date/Time Cholesterol, total 142 07/26/2019 06:03 AM  
 HDL Cholesterol 79 07/26/2019 06:03 AM  
 LDL, calculated 56.2 07/26/2019 06:03 AM  
 VLDL, calculated 6.8 07/26/2019 06:03 AM  
 Triglyceride 34 07/26/2019 06:03 AM  
 CHOL/HDL Ratio 1.8 07/26/2019 06:03 AM  
  
Pleural fluid cytology: 12/5/19 B-cell lymphoma Data review: 
EKG tracing personally reviewed: a.fib, rate 59 Telemetry: a.fib, rates in the upper 30s to 50s Echocardiogram: 
12/04/19 ECHO ADULT COMPLETE 12/06/2019 12/6/2019 Narrative · Left Ventricle: Normal cavity size and systolic function (ejection  
fraction normal). Moderate concentric hypertrophy. Estimated left  
ventricular ejection fraction is 56 - 60%. No regional wall motion  
abnormality noted. Left ventricular diastolic dysfunction. · Left Atrium: Mildly dilated left atrium. · Right Atrium: Moderately dilated right atrium. · Aortic Valve: Aortic valve sclerosis with stenosis. Aortic valve mean  
gradient is 15.5 mmHg. Aortic valve area is 1.7 cm2. Mild aortic valve  
stenosis is present. · Mitral Valve: Mitral valve thickening. Myxomatous mitral valve disease. Mild mitral valve regurgitation is present. · Tricuspid Valve: Moderate tricuspid valve regurgitation is present. · IVC/Hepatic Veins: Severely elevated central venous pressure (15+ mmHg); IVC diameter is larger than 21 mm and collapses less than 50% with  
respiration. · Pulmonary Artery: Pulmonary hypertension. · Pulmonic Valve: Mild pulmonic valve regurgitation is present. Signed by: Dayday Ravi MD  
 
 
Other imaging: CXR: Impression: Increased left effusion with underlying consolidation. Assessment: 1. Left pleural effusion, s/p left pleur-x 
4L serous fluid drained 2. B cell lymphoma 3. Permanent atrial fibrillation 4. Bradycardia, asymptomatic 5. Chronic diastolic heart failure 6. Mild aortic stenosis 7. CKD stage IV 
8. Anemia of chronic disease. 9. DNR status Recommendations / Plan: No specific therapy needed for asymptomatic bradycardia. Pacemaker would be only be indicated if clearly symptomatic or if he has syncope, but since he does not appear symptomatic at all, this should be avoided. Generally seems to be sensible to try and avoid invasive cardiac procedures unless there is a very strong indication in this patient. Obviously need to avoid AV deepthi blocking agents such as beta blockers or CCB Can continue Eliquis for now. Signing off for now. Available to see as needed upon request.  
 
Signed: 
Tito Mireles Interventional Cardiology 12/23/19

## 2025-01-30 NOTE — PROGRESS NOTES
[facility] [place] [id number] Juanita Cates, [address], [address] [place] [address] W: [phone]  F: [phone] Reason for Visit:  
Elmira Saavedra is a 80 y.o. male who is seen  B cell Lymphoma- NOS Treatment History:  
· 12/4/19: CT chest : Bilateral pleural effusions, smaller right (decreased), and very large on the left with left hemidiaphragmatic inversion (increased.) · 12/5/19: Thoracentesis B cell Lymphoma · 12/27/19: PET CT showed LLL consolidative airspace disease is likely neoplastic, Bibasilar pleural rinds are likely neoplastic · 1/17/2020: BM biopsy- no evidence of Lymphoma. Peripheral blood - no abnormal cells and FISH negative History of Present Illness:  
Patient is a 80 y.o. male with multiple co morbidities as below specifically CKD, CHF , personal h/o colon cancer s/o hemicolectomy 2015 and Afib on Eliquis is seen for B cell Lymphoma He has had recurrent pleural effusions and was admitted 12/4/19-12/9/19 and had a left sided thoracentesis. Cytology of his pleural fluid was consistent with B-cell lymphoma. He had a PET scan scheduled and was to be seen by medical oncology but presented to the ER with SOB on 12/23/19. Was found to have recurrent L sided effusion and had a L sided PleurX cath placed by Dr. Sierra Anderson. He now comes to review management after BM biopsy and labs He has the L pleurx drained every other day and this has increased from 500 cc to 700 cc. He does get a little SOB. Fluid is a little murky but still yellow. .  
 
He states that his problems started about 6 months ago when he noted LE edema thought to be from CHF. This resolved the edema but then he developed progressive SOB. His L chest tube has been putting out 500 cc every other day and its clear. He is using a walker and is ambulating some 100 feet and does not have SOB with that. He has had no fevers, chills, sweats, weight or appetite changes. No bleeding. Pt. is scheduled on 02-.   He drinks wine He quit smoking Fh OF Leukemia and colon cancer Past Medical History:  
Diagnosis Date  Arrhythmia   
 irrigular heart beat- A FIB  Cancer [facility])  COLON   
 Cancer ([facility] SKIN  
 [facility]  [facility] failure [facility]  Hypertension Past Surgical History:  
Procedure Laterality Date  HX OTHER SURGICAL    
 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  
 HX TONSIL AND ADENOIDECTOMY AS CHILD Social History Tobacco Use  Smoking status: Former Smoker Packs/day: 0.50 Years: 32.00 Pack years: 16.00 Types: Cigarettes Last attempt to quit: 1983 Years since quittin.5  Smokeless tobacco: Never Used Substance Use Topics  Alcohol use: Yes Comment: edie/gin 7 per week Family History Problem Relation Age of Onset  Colon Cancer Mother  Lung Cancer Father  Cancer Brother UNKNOWN Current Outpatient Medications Medication Sig  furosemide (LASIX) 40 mg tablet Take 40 mg by mouth two (2) times a day.  loperamide (IMODIUM) 2 mg capsule Take 2 mg by mouth daily as needed.  potassium chloride SR (KLOR-CON 10) 10 mEq tablet Take 20 mEq by mouth daily.  guaiFENesin-dextromethorphan (ROBAFEN DM COUGH)  mg/5 mL liqd Take 30 mL by mouth every four (4) hours as needed.  diphenhydrAMINE (BENADRYL ALLERGY) 25 mg tablet Take 12.5 mg by mouth nightly as needed for Sleep.  apixaban (ELIQUIS) 2.5 mg tablet Take 2.5 mg by mouth two (2) times a day. Indications: Atrial Fibrillation  ferrous sulfate 325 mg (65 mg iron) tablet Take 325 mg by mouth two (2) times a day.  levothyroxine (SYNTHROID) 175 mcg tablet Take 175 mcg by mouth Daily (before breakfast).  melatonin 10 mg tab Take 1 Tab by mouth nightly.  omeprazole (PRILOSEC) 20 mg capsule Take 20 mg by mouth two (2) times a day.  thiamine HCL (B-1) 100 mg tablet Take 100 mg by mouth daily.  triamcinolone acetonide (KENALOG) 0.1 % topical cream Apply  to affected area two (2) times daily as needed for Skin Irritation or Itching. use thin layer  cholecalciferol, vitamin D3, (VITAMIN D3) 2,000 unit tab Take 1 Tab by mouth daily.  dutasteride (AVODART) 0.5 mg capsule Take 0.5 mg by mouth daily.  terazosin (HYTRIN) 10 mg capsule Take 10 mg by mouth daily. Indications: high blood pressure No current facility-administered medications for this visit. No Known Allergies Review of Systems: A complete review of systems was obtained, negative except as described above. Physical Exam:  
 
Visit Vitals /82 (BP 1 Location: Right arm) Pulse (!) 52 Temp 98.2 °F (36.8 °C) Ht 6' 1\" (1.854 m) Wt 167 lb 1.6 oz (75.8 kg) SpO2 94% BMI 22.05 kg/m² ECOG PS: 2 General: No distress, Uses a walker Eyes: PERRLA, anicteric sclerae HENT: Atraumatic, OP clear Neck: Supple Lymphatic: No cervical, supraclavicular, or inguinal adenopathy Respiratory: normal respiratory effort CV: Normal rate, no peripheral edema GI: Soft, nontender, nondistended, no masses, no hepatomegaly, no splenomegaly MS: Normal gait and station. Skin: Multiple ecchymoses, or petechiae. Normal temperature, turgor, and texture. Psych: Alert, oriented, appropriate affect, normal judgment/insight Results:  
 
Lab Results Component Value Date/Time WBC 3.9 (L) 01/17/2020 09:46 AM  
 HGB 9.1 (L) 01/17/2020 09:46 AM  
 HCT 30.2 (L) 01/17/2020 09:46 AM  
 PLATELET 924 (L) 54/13/4622 09:46 AM  
 .7 (H) 01/17/2020 09:46 AM  
 ABS. NEUTROPHILS 3.0 01/17/2020 09:46 AM  
 
Lab Results Component Value Date/Time  Sodium 146 (H) 12/23/2019 09:45 AM  
 Potassium 4.3 12/23/2019 09:45 AM  
 Chloride 112 (H) 12/23/2019 09:45 AM  
 CO2 28 12/23/2019 09:45 AM  
 Glucose 120 (H) 12/23/2019 09:45 AM  
 BUN 55 (H) 12/23/2019 09:45 AM  
 Creatinine 2.35 (H) 12/23/2019 09:45 AM  
 GFR est AA 32 (L) 12/23/2019 09:45 AM  
 GFR est non-AA 26 (L) 12/23/2019 09:45 AM  
 Calcium 8.9 12/23/2019 09:45 AM  
 Glucose (POC) 148 (H) 07/11/2013 09:36 PM  
 
Lab Results Component Value Date/Time Bilirubin, total 0.5 12/23/2019 09:45 AM  
 ALT (SGPT) 29 12/23/2019 09:45 AM  
 AST (SGOT) 31 12/23/2019 09:45 AM  
 Alk. phosphatase 75 12/23/2019 09:45 AM  
 Protein, total 6.8 12/23/2019 09:45 AM  
 Albumin 3.1 (L) 12/23/2019 09:45 AM  
 Globulin 3.7 12/23/2019 09:45 AM  
 
Lab Results Component Value Date/Time Ferritin 132 07/26/2019 06:03 AM  
 Vitamin B12 558 07/26/2019 06:03 AM  
 Folate 9.8 07/26/2019 06:03 AM  
  (H) 12/17/2019 02:15 PM  
 TSH 4.08 (H) 12/23/2019 09:45 AM  
 M-Deion Not Observed 07/27/2019 03:27 AM  
 HIV SCREEN 4TH GENERATION WRFX Non Reactive 12/17/2019 02:15 PM  
 
Lab Results Component Value Date/Time INR 1.1 12/23/2019 09:45 AM  
 aPTT 33.3 (H) 12/23/2019 09:45 AM  
 
 
Records reviewed and summarized above. Pathology report(s) reviewed PLeural fluid cytology CYTOLOGIC INTERPRETATION:  
Pleural Fluid, ThinPrep and cell block:  
B-cell lymphoma, see comment General Categorization Positive for malignancy. Specimen Adequacy Satisfactory for evaluation. CPT: [id number], [id number], [id number], [id number], [id number], [id number] Comment Overall findings are diagnostic of a B-cell lymphoma. The differential diagnosis includes diffuse large B-cell lymphoma, B-cell prolymphocytic leukemia/lymphoma, and chronic lymphocytic leukemia/small lymphocytic lymphoma. Correlation with radiology and biopsy/excision of suspicious areas is recommended. Bone marrow biopsy is also recommended, if clinically indicated. This case is seen in consultation with Dr. Misael Rose, who agrees with the above findings. Immunohistochemical stains are performed from block 1A. CD20 is diffusely positive, indicating a B-cell lymphoproliferative disorder.  CD3 marks few background small T-cells. Cyclin D1 and SOX11 stains are negative, making mantle cell lymphoma less likely. EBV in situ hybridization is also negative. Immunohistochemical positive and negative controls stain appropriately. Concurrent flow cytometric studies are reviewed. Appropriate immunohistochemical stains are ordered and evaluated in order to further characterize and/or quantify the process observed by light microscopy Flow Cytometry:  
Diagnosis: Monoclonal B-cells, consistent with a B-cell lymphoproliferative disorder (see comments). Comments: Flow cytometry shows a monoclonal B-cell population (94% of total cells) co-expression CD5 and CD23, consistent with a B-cell lymphoma/leukemia. B-lymphocytes appear to be mostly intermediate to large based on forward light scatter characteristics. The differential diagnosis includes but is not limited to chronic lymphocytic leukemia/small lymphocytic lymphoma, prolymphocytic B-cell leukemia/lymphoma, mantle cell lymphoma, and diffuse large B-cell lymphoma. Please correlate the result with morphologic findings and clinical information Radiology report(s) reviewed above. Assessment:  
1) B cell Lymphoma With bilateral ( L> R) Pleural effusions s/p Pleurx placement PET CT with no other clear site of disease apart from vague Lung consolidative changes Pleural fluid cytology is reviewed with Heme Path Dr. 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 Mantle cell is ruled our as is primary effusion Lymphoma He has a reasonable PS status despite is age and co morbidities I think the most crucial thing is to determine whether he has DLBCL as opposed to the other 2 entities mentioned above. The latter two may have an indolent course and hence if that is the case we could consider Rituxan alone or BTK inhibitors if he had a 17pdel. Alternatively, now since he has a pleurx we could also observe without treatments However if he has a DLBCL the decision would be more challenging as I dont see him tolerating RCHOP I suppose if we could determine whether this is ABC subtype or not would be helpful as choices might include R-Revlimid- RCVP and mini doses or BR 
 
2) Bilateral Pleural effusions- Malignant L> R 
S/P L pleurx 3) Anemia Secondary to CKD and likely malignancy Bm Involvement seems less likely with a negative PET 4) CKD 5) Afib on Eliquis 6) H/O Colon cancer s/p resection 2015 7) Psychosocial 
Supportive Family Plan:  
 
· PB Immunophenotype and CLL FISH 
· BM Biopsy · RTC 10 days > 50% of this 60 min visit spent in counseling and care co ordination I appreciate the opportunity to participate in Mr. Smyth Has Ana Cristina's care.  
 
Signed By: Debbie Patel MD

## (undated) DEVICE — Device

## (undated) DEVICE — SOLUTION IV 500ML 0.9% SOD CHL FLX CONT

## (undated) DEVICE — SET ADMIN 22ML L101IN GRAV W/ CK VLV RLER SLDE CLMP 2 PC M

## (undated) DEVICE — TUBING HYDR IRR --